# Patient Record
Sex: FEMALE | Race: WHITE | Employment: OTHER | URBAN - METROPOLITAN AREA
[De-identification: names, ages, dates, MRNs, and addresses within clinical notes are randomized per-mention and may not be internally consistent; named-entity substitution may affect disease eponyms.]

---

## 2018-07-07 ENCOUNTER — HOSPITAL ENCOUNTER (EMERGENCY)
Age: 83
Discharge: HOME | End: 2018-07-07
Attending: EMERGENCY MEDICINE
Payer: MEDICARE

## 2018-07-07 ENCOUNTER — APPOINTMENT (OUTPATIENT)
Dept: GENERAL RADIOLOGY | Age: 83
End: 2018-07-07
Attending: NURSE PRACTITIONER
Payer: MEDICARE

## 2018-07-07 VITALS
BODY MASS INDEX: 28.32 KG/M2 | HEART RATE: 65 BPM | TEMPERATURE: 97.6 F | HEIGHT: 61 IN | DIASTOLIC BLOOD PRESSURE: 75 MMHG | SYSTOLIC BLOOD PRESSURE: 135 MMHG | RESPIRATION RATE: 15 BRPM | OXYGEN SATURATION: 95 % | WEIGHT: 150 LBS
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
## 2018-07-07 DIAGNOSIS — S22.41XA CLOSED FRACTURE OF MULTIPLE RIBS OF RIGHT SIDE, INITIAL ENCOUNTER: ICD-10-CM

## 2018-07-07 LAB
INR PPP: 2.1 (ref 0.9–1.1)
PROTHROMBIN TIME: 20.8 SEC (ref 9–11.1)

## 2018-07-07 PROCEDURE — 99283 EMERGENCY DEPT VISIT LOW MDM: CPT

## 2018-07-07 PROCEDURE — 36415 COLL VENOUS BLD VENIPUNCTURE: CPT | Performed by: NURSE PRACTITIONER

## 2018-07-07 PROCEDURE — 77030027138 HC INCENT SPIROMETER -A

## 2018-07-07 PROCEDURE — 71101 X-RAY EXAM UNILAT RIBS/CHEST: CPT

## 2018-07-07 PROCEDURE — 74011250637 HC RX REV CODE- 250/637: Performed by: NURSE PRACTITIONER

## 2018-07-07 PROCEDURE — 85610 PROTHROMBIN TIME: CPT | Performed by: NURSE PRACTITIONER

## 2018-07-07 RX ORDER — HYDROCODONE BITARTRATE AND ACETAMINOPHEN 5; 325 MG/1; MG/1
1 TABLET ORAL
Qty: 20 TAB | Refills: 0 | Status: SHIPPED | OUTPATIENT
Start: 2018-07-07 | End: 2019-08-19

## 2018-07-07 RX ORDER — LIDOCAINE 50 MG/G
PATCH TOPICAL
Qty: 20 EACH | Refills: 0 | Status: SHIPPED | OUTPATIENT
Start: 2018-07-07 | End: 2019-08-19

## 2018-07-07 RX ORDER — WARFARIN SODIUM 5 MG/1
5 TABLET ORAL DAILY
Status: ON HOLD | COMMUNITY
End: 2022-03-20

## 2018-07-07 RX ORDER — ATORVASTATIN CALCIUM 40 MG/1
40 TABLET, FILM COATED ORAL DAILY
COMMUNITY
End: 2022-03-25

## 2018-07-07 RX ORDER — LISINOPRIL 10 MG/1
10 TABLET ORAL DAILY
COMMUNITY
End: 2022-03-25

## 2018-07-07 RX ORDER — ACETAMINOPHEN 500 MG
1000 TABLET ORAL
Status: COMPLETED | OUTPATIENT
Start: 2018-07-07 | End: 2018-07-07

## 2018-07-07 RX ADMIN — ACETAMINOPHEN 1000 MG: 500 TABLET ORAL at 14:48

## 2018-07-07 NOTE — ED PROVIDER NOTES
HPI Comments: Patient is an 68-year-old female with past medical history significant for bilateral hip and knee replacements, arthritis and hypertension he is ambulatory to the ED today with her family after a ground-level fall. Patient states she was trying to get into the house and the door was stuck. The last time she pushed the door popped open and she fell over onto the right side striking a paint can and wishes behind the door. The event occurred about 12:30 this afternoon. Patient states she has some tenderness to her right posterior chest wall were she landed on the paint can. Patient is concerned she may have broken a rib. Patient denies any dizziness, loss of consciousness, shortness of breath, nausea or vomiting. She is no further complaints at this time. Primary care provider:Not On File Bsi The history is provided by the patient and a relative. No  was used. Past Medical History:  
Diagnosis Date  Arthritis  Hypertension Past Surgical History:  
Procedure Laterality Date  CARDIAC SURG PROCEDURE UNLIST  HX ORTHOPAEDIC History reviewed. No pertinent family history. Social History Social History  Marital status:  Spouse name: N/A  
 Number of children: N/A  
 Years of education: N/A Occupational History  Not on file. Social History Main Topics  Smoking status: Never Smoker  Smokeless tobacco: Never Used  Alcohol use No  
 Drug use: Not on file  Sexual activity: Not on file Other Topics Concern  Not on file Social History Narrative  No narrative on file ALLERGIES: Review of patient's allergies indicates no known allergies. Review of Systems Constitutional: Negative for appetite change, chills and fever. HENT: Negative. Respiratory: Negative for cough, shortness of breath and wheezing. Cardiovascular: Positive for chest pain.   
Gastrointestinal: Negative for abdominal pain, constipation, diarrhea, nausea and vomiting. Genitourinary: Negative for dysuria and urgency. Musculoskeletal: Negative for back pain. Skin: Negative for color change and rash. Neurological: Negative for dizziness and headaches. Psychiatric/Behavioral: Negative. All other systems reviewed and are negative. Vitals:  
 07/07/18 1345 BP: 190/86 Pulse: 77 Resp: 18 Temp: 97.6 °F (36.4 °C) SpO2: 97% Weight: 68 kg (150 lb) Height: 5' 1\" (1.549 m) Physical Exam  
Constitutional: She is oriented to person, place, and time. She appears well-developed and well-nourished. HENT:  
Head: Normocephalic and atraumatic. Neck: Trachea normal and normal range of motion. Neck supple. No tracheal tenderness, no spinous process tenderness and no muscular tenderness present. No tracheal deviation present. Cardiovascular: Normal rate, regular rhythm, normal heart sounds and intact distal pulses. Pulmonary/Chest: Effort normal and breath sounds normal. No accessory muscle usage or stridor. No tachypnea and no bradypnea. No respiratory distress. She has no decreased breath sounds. She has no wheezes. She has no rhonchi. She has no rales. She exhibits tenderness and bony tenderness. She exhibits no mass, no crepitus, no edema, no deformity, no swelling and no retraction. Right lateral/posterir rib trauma without ecchymosis or crepitus. Able to speak full sentences. Abdominal: Soft. Bowel sounds are normal. There is no tenderness. There is no guarding. Musculoskeletal: Normal range of motion. Neurological: She is alert and oriented to person, place, and time. Skin: Skin is warm and dry. No erythema. Psychiatric: She has a normal mood and affect. Her speech is normal and behavior is normal. Judgment and thought content normal. Cognition and memory are normal.  
Nursing note and vitals reviewed. [facility] [place] 
 
 
ED Course Assessment & Plan:  
 
Orders Placed This Encounter  XR RIBS RIGHT WITH PA CHEST MIN 3 VIEWS  
 POC PT/INR Discussed with Jeanette Moore MD,ED Provider Rosalie Granado NP 
07/07/18 
1:56 PM 
 
 
Procedures Chest x-ray shows severe diffuse osteopenia with likely nondisplaced rib fractures of the ninth and 10th ribs. This coincides with the area that is tender to palpation. Will discharge the patient home with Norco for pain, and Narcan as needed for respiratory depression, Lidoderm patch and Tylenol as needed for pain. Patient was instructed to monitor how much Tylenol she's taken as the Norco contains Tylenol. Patient states the 1 g Tylenol that was given to her here decreased her pain by half. Patient was also given an incentive spirometer to help prevent pneumonia. Patient advised to follow up with her primary care provider this week. 3:18 PM 
The patient has been reevaluated. The patient is ready for discharge. The patient's signs, symptoms, diagnosis, and discharge instructions have been discussed and the patient/ family has conveyed their understanding. The patient is to follow up as recommended or return to the ED should their symptoms worsen. Plan has been discussed and the patient is in agreement. LABORATORY TESTS: 
Labs Reviewed PROTHROMBIN TIME + INR - Abnormal; Notable for the following:   
    Result Value INR 2.1 (*) Prothrombin time 20.8 (*) All other components within normal limits IMAGING RESULTS: 
Xr Ribs Rt W Pa Cxr Min 3 V Result Date: 7/7/2018 EXAM:  XR RIBS RT W PA CXR MIN 3 V INDICATION:  Pain after right lateral lower rib trauma COMPARISON: None. FINDINGS: A frontal radiograph of the chest and 3 oblique views of the right ribs demonstrate severe diffuse osteopenia. Subtle irregularity of the cortical contour of the lateral right ninth and 10th ribs is shown which is suspicious for fractures. There is no pneumothorax or pleural effusion.  Axillary pacemaker is shown on the left with dual leads. Aortic valve prosthesis is also noted as are medial wires and calcified bilateral breast implants IMPRESSION:  Subtle fractures of right ninth and 10th ribs. MEDICATIONS GIVEN: 
Medications  
acetaminophen (TYLENOL) tablet 1,000 mg (1,000 mg Oral Given 7/7/18 1448) IMPRESSION: 
1. Fall, initial encounter 2. Closed fracture of multiple ribs of right side, initial encounter PLAN: 
1. Current Discharge Medication List  
  
START taking these medications Details HYDROcodone-acetaminophen (NORCO) 5-325 mg per tablet Take 1 Tab by mouth every eight (8) hours as needed for Pain. Max Daily Amount: 3 Tabs. Qty: 20 Tab, Refills: 0 Associated Diagnoses: Closed fracture of multiple ribs of right side, initial encounter  
  
naloxone (NARCAN) 2 mg/actuation spry Use 1 spray intranasally into 1 nostril. Use a new Narcan nasal spray for subsequent doses and administer into alternating nostrils. May repeat every 2 to 3 minutes as needed for opioid overdose symptoms. Indications: OPIATE-INDUCED RESPIRATORY DEPRESSION Qty: 2 Each, Refills: 0  
  
lidocaine (LIDODERM) 5 % Apply patch to the affected area for 12 hours a day and remove for 12 hours a day. Qty: 20 Each, Refills: 0 Associated Diagnoses: Closed fracture of multiple ribs of right side, initial encounter CONTINUE these medications which have NOT CHANGED Details  
warfarin (COUMADIN) 5 mg tablet Take 5 mg by mouth daily. lisinopril (PRINIVIL, ZESTRIL) 5 mg tablet Take 5 mg by mouth daily. OTHER Take 1 Tab by mouth two (2) times a day. metoprolol  
  
atorvastatin (LIPITOR) 40 mg tablet Take 40 mg by mouth daily. 2.  
Follow-up Information Follow up With Details Comments Contact Info Not On File [facility] Schedule an appointment as soon as possible for a visit this week for follow-up Not On File (62) Patient has a PCP but that physician is not listed in [facility].  
  
 [facility] EMERGENCY DEPT  As needed, If symptoms worsen [address]  [address] [facility] [address] [phone] 3. Return to ED for new or worsening symptoms Aries Silva NP

## 2018-07-07 NOTE — ED NOTES
The patient was discharged home by [facility], NP in stable condition. The patient is alert and oriented, in no respiratory distress. The patient's diagnosis, condition and treatment were explained. The patient expressed understanding. Prescriptions given. A discharge plan has been developed. A  was not involved in the process. Aftercare instructions were given. Pt discharged from the ED via w/c by this RN to the family car

## 2018-07-07 NOTE — ED NOTES
Family has returned and at bedside now.  Patient states that her pain is 1/2 of what it was upon arrival.

## 2018-07-07 NOTE — ED NOTES
Patient wet through her pad and pants while in xray. We changed her clothes and gave her new panties and pad. Will send home the jeans in a bag. No change in the pain status in right back; still using ice pack. Lights dimmed for comfort and she is resting.

## 2018-07-07 NOTE — ED TRIAGE NOTES
Patient was walking at home and tripped over a paint can and landed on top of it. Preexisting she wears a back brace intermittently for chronic back pain over the past 3 years. Today has pain in right upper back area. No loss of consciousness and did not hit head. Gave her a pillow and ice pack for comfort. Is able to ambulate to get to the bed. Incident happened at 1230.

## 2018-07-07 NOTE — DISCHARGE INSTRUCTIONS
Broken Rib: Care Instructions  Your Care Instructions    A broken rib is a crack or break in one of the bones of the rib cage. Breathing can be very painful because the muscles used for breathing pull on the rib. In most cases, a broken rib will heal on its own. You can take pain medicine while the rib mends. Pain relief allows you to take deep breaths. In the past, doctors recommended taping or wrapping broken ribs. This is no longer done because taping makes it hard for you to take deep breaths. Taking deep breaths may help prevent pneumonia or a partial collapse of a lung. Your rib will heal in about 6 weeks. You heal best when you take good care of yourself. Eat a variety of healthy foods, and don't smoke. Follow-up care is a key part of your treatment and safety. Be sure to make and go to all appointments, and call your doctor if you are having problems. It's also a good idea to know your test results and keep a list of the medicines you take. How can you care for yourself at home? · Be safe with medicines. Read and follow all instructions on the label. ¨ If the doctor gave you a prescription medicine for pain, take it as prescribed. ¨ If you are not taking a prescription pain medicine, ask your doctor if you can take an over-the-counter medicine. · Even if it hurts, try to cough or take the deepest breath you can at least once every hour. This will get air deeply into your lungs. This may reduce your chance of getting pneumonia or a partial collapse of a lung. Hold a pillow against your chest to make this less painful. · Put ice or a cold pack on the area for 10 to 20 minutes at a time. Put a thin cloth between the ice and your skin. When should you call for help? Call 911 anytime you think you may need emergency care. For example, call if:  ? · You have severe trouble breathing. ?Call your doctor now or seek immediate medical care if:  ? · You have some trouble breathing.    ? · You have a fever.   ? · You have a new or worse cough. ? Watch closely for changes in your health, and be sure to contact your doctor if:  ? · You have pain even after taking your medicine. ? · You do not get better as expected. Where can you learn more? Go to http://alyssa-dalila.info/. Enter M135 in the search box to learn more about \"Broken Rib: Care Instructions. \"  Current as of: March 21, 2017  Content Version: 11.4  © 8036-6275 [email or web address]. Care instructions adapted under license by [employer] (which disclaims liability or warranty for this information). If you have questions about a medical condition or this instruction, always ask your healthcare professional. [address] any warranty or liability for your use of this information.

## 2019-08-19 ENCOUNTER — OFFICE VISIT (OUTPATIENT)
Dept: NEUROLOGY | Age: 84
End: 2019-08-19

## 2019-08-19 VITALS
BODY MASS INDEX: 30.43 KG/M2 | SYSTOLIC BLOOD PRESSURE: 150 MMHG | WEIGHT: 155 LBS | OXYGEN SATURATION: 96 % | HEART RATE: 78 BPM | RESPIRATION RATE: 16 BRPM | DIASTOLIC BLOOD PRESSURE: 84 MMHG | HEIGHT: 60 IN

## 2019-08-19 DIAGNOSIS — R44.3 HALLUCINATION: ICD-10-CM

## 2019-08-19 DIAGNOSIS — R41.3 MEMORY LOSS: Primary | ICD-10-CM

## 2019-08-19 DIAGNOSIS — R41.0 CONFUSION: ICD-10-CM

## 2019-08-19 RX ORDER — FUROSEMIDE 40 MG/1
40 TABLET ORAL
COMMUNITY
End: 2022-03-25

## 2019-08-19 RX ORDER — METOPROLOL TARTRATE 50 MG/1
TABLET ORAL 2 TIMES DAILY
COMMUNITY
End: 2022-03-25

## 2019-08-19 RX ORDER — SERTRALINE HYDROCHLORIDE 25 MG/1
TABLET, FILM COATED ORAL
Qty: 30 TAB | Refills: 3 | Status: SHIPPED | OUTPATIENT
Start: 2019-08-19 | End: 2020-06-13

## 2019-08-19 RX ORDER — MEMANTINE HYDROCHLORIDE 5 MG/1
TABLET ORAL
Refills: 0 | COMMUNITY
Start: 2019-07-02 | End: 2019-08-19 | Stop reason: SDUPTHER

## 2019-08-19 RX ORDER — SERTRALINE HYDROCHLORIDE 25 MG/1
TABLET, FILM COATED ORAL
Refills: 2 | COMMUNITY
Start: 2019-08-07 | End: 2019-08-19 | Stop reason: SDUPTHER

## 2019-08-19 RX ORDER — RISPERIDONE 1 MG/1
1 TABLET, FILM COATED ORAL 2 TIMES DAILY
Qty: 60 TAB | Refills: 3 | Status: SHIPPED | OUTPATIENT
Start: 2019-08-19 | End: 2019-10-29 | Stop reason: SDUPTHER

## 2019-08-19 RX ORDER — TOLTERODINE 4 MG/1
4 CAPSULE, EXTENDED RELEASE ORAL
COMMUNITY
End: 2022-03-25

## 2019-08-19 RX ORDER — PANTOPRAZOLE SODIUM 40 MG/1
40 TABLET, DELAYED RELEASE ORAL
COMMUNITY
End: 2022-03-25

## 2019-08-19 RX ORDER — MEMANTINE HYDROCHLORIDE 5 MG/1
TABLET ORAL
Qty: 60 TAB | Refills: 3 | Status: SHIPPED | OUTPATIENT
Start: 2019-08-19 | End: 2019-10-29 | Stop reason: SDUPTHER

## 2019-08-19 NOTE — PATIENT INSTRUCTIONS
[facility]   Statement to Patients  April 1, 2014      In an effort to ensure the large volume of patient prescription refills is processed in the most efficient and expeditious manner, we are asking our patients to assist us by calling your Pharmacy for all prescription refills, this will include also your  Mail Order Pharmacy. The pharmacy will contact our office electronically to continue the refill process. Please do not wait until the last minute to call your pharmacy. We need at least 48 hours (2days) to fill prescriptions. We also encourage you to call your pharmacy before going to  your prescription to make sure it is ready. With regard to controlled substance prescription refill requests (narcotic refills) that need to be picked up at our office, we ask your cooperation by providing us with at least 72 hours (3days) notice that you will need a refill. We will not refill narcotic prescription refill requests after 4:00pm on any weekday, Monday through Thursday, or after 2:00pm on Fridays, or on the weekends. We encourage everyone to explore another way of getting your prescription refill request processed using [employer], our patient web portal through our electronic medical record system. [employer] is an efficient and effective way to communicate your medication request directly to the office and  downloadable as an stewart on your smart phone . [employer] also features a review functionality that allows you to view your medication list as well as leave messages for your physician. Are you ready to get connected? If so please review the attatched instructions or speak to any of our staff to get you set up right away! Thank you so much for your cooperation. Should you have any questions please contact our Practice Administrator. RESULT POLICY      If we have ordered testing for you, know that; \"NO NEWS IS GOOD NEWS! \"     It is our policy that we know longer call patients with results, nor do we  give test results over the phone. We schedule follow up appointments so that your results can be discussed in person. This allows you to address any questions you have regarding the results. If you choose to go to an imaging center outside of [facility], it is your responsibility to bring imaging report and disc to follow up appointment. If something of concern is revealed on your test, we will contact you to discuss the matter and if needed schedule a sooner follow up appointment. Additionally, results may be found by using the My Chart feature and one of our patient service representatives at the  can give you instructions on how to access this feature to utilize our electronic medical record system. Thank you for your understanding.

## 2019-08-19 NOTE — PROGRESS NOTES
Chief Complaint   Patient presents with   [address] [facility] Patient    Dementia     Hospitalized 6/14-19/2019 at [facility] [place], operated by [facility]

## 2019-08-19 NOTE — PROGRESS NOTES
[facility] and [address] at [facility] Neurology Clinics at [address] [facility], [address] [facility] [address] [facility]  Cassie Gonzalez, [address] [place] [address]  [phone] Office  [phone] Facsimile           Referring: Self    Chief Complaint   Patient presents with    New Patient    Dementia   81-year-old right-handed woman who comes today coming by her daughter-in-law for evaluation of what they call follow-up from hospital visit where she was diagnosed with dementia. This 81-year-old lady was living by herself in [facility] until she was moved to [place] to the house next door to her son and daughter-in-law. She was moved so that she could be closer to family. They note that while in [facility] the patient was taking care of all of her finances. She was taking care of all of her household chores. She was not having any difficulty doing that. She in fact is now doing that herself here in [place]. After the move to [place] however the patient began to be increasingly confused. This was in April. This was an abrupt change. When the family looks back they say that there was some signs that she was having some cognitive decline but after the move it was an abrupt change. She started to hear things. She became paranoid. She thought there were people in the roof. She thought the neighbor was a murder who was trying to kill the grandson. She would wander in the woods. She went over to pick. She was calling the police. Things got so bad with the hallucinations that they took her to the hospital and she was hospitalized at the [facility]. She was started on Namenda and Risperdal.  When she got home she started to have these behaviors properly.   When they got her back on the medicine properly the combination of Namenda, Zoloft and Risperdal she became back to normal.  There was a CT scan done apparently when she was hospitalized and that was said to be normal.  No formal cognitive testing. No other testing done. There is family history of dementia. No focal deficits. No chest pain. No palpitations. No infectious symptoms. No fever chills. No injury. She is back to normal at this point. Past Medical History:   Diagnosis Date    Arthritis     Hypertension        Past Surgical History:   Procedure Laterality Date    CARDIAC SURG PROCEDURE UNLIST      HX ORTHOPAEDIC         Current Outpatient Medications   Medication Sig Dispense Refill    furosemide (LASIX) 40 mg tablet Take 40 mg by mouth.  pantoprazole (PROTONIX) 40 mg tablet Take 40 mg by mouth.  tolterodine ER (DETROL LA) 4 mg ER capsule Take 4 mg by mouth.  metoprolol tartrate (LOPRESSOR) 50 mg tablet Take  by mouth two (2) times a day.  memantine (NAMENDA) 5 mg tablet TAKE 1 TABLET TWICE A DAY 60 Tab 3    sertraline (ZOLOFT) 25 mg tablet TAKE 1 TABLET ORALLY  DAILY 30 Tab 3    risperiDONE (RISPERDAL) 1 mg tablet Take 1 Tab by mouth two (2) times a day. 60 Tab 3    lisinopril (PRINIVIL, ZESTRIL) 10 mg tablet Take 10 mg by mouth daily.  atorvastatin (LIPITOR) 40 mg tablet Take 40 mg by mouth daily.  warfarin (COUMADIN) 5 mg tablet Take 5 mg by mouth daily. Allergies   Allergen Reactions    Omeprazole Angioedema       Social History     Tobacco Use    Smoking status: Never Smoker    Smokeless tobacco: Never Used   Substance Use Topics    Alcohol use: No    Drug use: Not on file       Family History   Problem Relation Age of Onset    Dementia Other     Heart Disease Other        Review of Systems  Pertinent positives and negatives as noted with remainder of comprehensive review negative    Examination  Visit Vitals  /84 (BP 1 Location: Left arm, BP Patient Position: Sitting)   Pulse 78   Resp 16   Ht 5' (1.524 m)   Wt 70.3 kg (155 lb)   SpO2 96%   BMI 30.27 kg/m²   She is a very pleasant lady.   She has appropriate dress and grooming. Her affect is appropriate. She has appropriate make-up placed today. She has no scleral icterus. Her neck is supple I do not hear a bruit. Her heart is regular. She has symmetrical pulses. There is no edema of her extremities. No rash present. No bruising of the extremities. Neurologically she is awake alert oriented to the correct day date and month. She knows the current president as well as the previous president. She identifies the floor correctly as well as a city and state. She correctly calculates. Follows all commands. No right left confusion. Registration 3/3 and registration 2/3 at 5 minutes. Intact cranial nerves II-XII and she is status post cataract surgery bilaterally. Visual fields full to confrontation. Disc margins not well seen. Age-related paratonia throughout. No abnormal movement. No pronation and no drift. Strength full in the upper and lower extremities in all muscle groups to direct confrontational testing directly. Reflexes symmetrical in the upper and lower extremities bilaterally. No pathologic reflexes. No ataxia. Sensory is intact primary modalities. Gait is steady. 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 I be concerned that perhaps there was something else ongoing.   In any regard him to maintain the medications that she is on right now I did send refills because she is running out. Going to send for the records from the [facility]. I like to make sure the imaging was done. We will get EEG as well to evaluate for epileptiform abnormalities. Carotid Doppler. I also like to make sure they did appropriate laboratory analyses which I am certainly did as [facility] is quite thorough. I like to get a formal neuropsychological evaluation. She will follow-up at conclusion of that testing    Halie Haskins MD    This note was created using voice recognition software. Despite editing, there may be syntax errors. This note will not be viewable in [address]

## 2019-08-27 ENCOUNTER — TELEPHONE (OUTPATIENT)
Dept: NEUROLOGY | Age: 84
End: 2019-08-27

## 2019-08-27 NOTE — TELEPHONE ENCOUNTER
PA submitted for Risperidone to [facility] [email or web address] [facility] via Cover My Meds. Status Pending. Allow 24-72 hours for response.      CMM Key:  [id number]  Case #: [id number]

## 2019-08-28 NOTE — TELEPHONE ENCOUNTER
PA APPROVED for Risperidone 1mg by [facility] [employer] [facility]. Effective dates 08/27/19 - 08/26/21. Case # (none given). Approval will be scanned into media for review. Please send Rx to patient's preferred pharmacy (if necessary).

## 2019-10-29 RX ORDER — MEMANTINE HYDROCHLORIDE 5 MG/1
TABLET ORAL
Qty: 60 TAB | Refills: 3 | Status: SHIPPED | OUTPATIENT
Start: 2019-10-29 | End: 2020-12-02

## 2019-10-29 RX ORDER — RISPERIDONE 1 MG/1
1 TABLET, FILM COATED ORAL 2 TIMES DAILY
Qty: 60 TAB | Refills: 3 | Status: SHIPPED | OUTPATIENT
Start: 2019-10-29 | End: 2020-12-02

## 2019-12-22 ENCOUNTER — DOCUMENTATION ONLY (OUTPATIENT)
Dept: NEUROLOGY | Age: 84
End: 2019-12-22

## 2019-12-22 NOTE — PROGRESS NOTES
View and organization of outside records. Records received from [facility] for admission date Regina 15, 2019 discharge date noted to be July 2, 2019. Discharge diagnoses unspecified dementia with behavioral disturbance  Major depressive disorder  Chronic diastolic heart failure  Urinary tract infection  Deficiency of vitamin B  Dyslipidemia  Peptic ulcer  Anxiety disorder  Hypertensive heart disease with heart failure  Overactive bladder  Long-term current use of drug therapy  Long-term current use of aspirin  Presence of prosthetic heart valve  Wandering and diseases classified elsewhere    Laboratory analyses from June 22, 2896 finds a metabolic panel largely unrevealing except for BUN slightly high at 21. Creatinine was normal.  LDL 73  CBC from June 14, 2009 unremarkable  Urinalysis from June 14, 2019 with leukocyte esterase and pyuria    Psychiatric progress note from July 1, 2019 where patient noted she was doing better. Patient said to have a. Much [place] and stated she was not hearing voices. She was preoccupied with her grandsons death and she was noted to be telling folks that he was shot. She was anxious but redirectable. She had been compliant with her treatment without mona. She was alert and oriented x3. At that time she was on Risperdal 1 mg twice daily as well as Zoloft 25 mg nightly. She was also on Namenda 5 mg twice daily, vitamin B12 orally as well as trazodone 50 mg at bedtime. Other medications also listed. Diagnoses were dementia, psychosis and urinary tract infection. Psychiatric progress note from June 30, 2019 patient was said to be more calm. Actually it has the same interval history as the previous note.   There is a list under diagnosis assessment and plan of 6/24/2019 and spoke with patient's son who is concerned of her ongoing psychosis-we will DC Seroquel add Risperdal and titrate  6/25/2019 increase Risperdal to 1 mg at bedtime and 0.5 mg every morning and continue Namenda  6/26/2019 add Zoloft for depressive symptoms  6/30/2019, continue to adjust medication Zoloft and Namenda. Risperdal added for psychotic symptoms but still delusional dose increased    Psychiatric progress note from June 29, 2019 has the same interval history as the previous last 2 notes and has the same plan as the previous day although 6/29/2019's is calmer continue to adjust medications Zoloft and Namenda with Risperdal added for psychotic symptoms and dose increase    Office documentation from June 27, 2019 again with the same interval history. Able vital signs. Plan was the same as outlined above    June 26, 2019 psychiatric progress note indicates the patient's mood was sad and fearful and she believes someone is shot her grandson and she was hearing voices with crying spells. She was anxious but redirectable. No mona and she was said to be oriented x3. Plan as noted above    June 25, 2019 progress note patient said to have a better mood and less preoccupied with delusional themes. No longer hearing voices since yesterday of neighbors trying to harm her grandson. Poor attention. Poor short-term memory. No agitation or behavior abnormality. Same plan    Progress note from June 24, 2019 noted patient had a bad weekend. She had been crying stating she was hearing voices of her neighbors trying to harm her grandson. Poor attention. Poor short-term memory. Compliant. No agitation. Same plan    June 23, 2019 progress note psychiatric. This notes patient has a history of dementia was complaining of auditory hallucinations but not now. No paranoid feelings. Not depressed. She was oriented and appropriately dressed and they were going to continue the treatment plan. Psychiatric progress note from June 22, 2019 indicates patient was admitted due to psychosis and history of dementia. Family brought her in due to behavior which was erratic and bizarre.   She was wandering out of the home and hearing voices. She denies any hallucination at that time. She was paranoid particularly about the neighbors. She was eating and sleeping okay. She was compliant with her medicines. Psychiatric progress note from June 24, 2019 with the patient was alert and appeared relaxed. Pleasant with poor short-term memory. She was hearing voices but she did not feel threatened and denied being paranoid. With no agitation. Several notes basically outlining the same. The initial history and physical dated Regina 15, 2019 finds an 81-year-old lady with auditory and visual hallucinations for a month. Recent diagnosis of dementia started on new medication and told by her neurologist to come to the ER if her symptoms worsen. Family noted she was having hallucinations of her grandson being in danger particularly danger of being harmed by a neighbor. She made statements she was going to hurt her grandson. She denies suicidal or homicidal ideation at that time. She was said to be oriented x3 with normal speech and grossly nonfocal.    Laboratory analyses found a normal CBC. Urine was dirty. CT scan of the head said to show white matter abnormalities but nothing acute. She was given Rocephin. She was discharged from the hospital    EKG dated June 14, 2019 with left ventricular hypertrophy and repolarization abnormality. Copious nursing documentation.     A total of 156 pages reviewed and summarized as above    Sharad Grace MD  12noon - 1226pm

## 2020-06-13 RX ORDER — SERTRALINE HYDROCHLORIDE 25 MG/1
TABLET, FILM COATED ORAL
Qty: 30 TAB | Refills: 3 | Status: SHIPPED | OUTPATIENT
Start: 2020-06-13 | End: 2020-09-14

## 2020-09-14 RX ORDER — SERTRALINE HYDROCHLORIDE 25 MG/1
TABLET, FILM COATED ORAL
Qty: 30 TAB | Refills: 3 | Status: SHIPPED | OUTPATIENT
Start: 2020-09-14 | End: 2021-02-25

## 2020-12-02 RX ORDER — MEMANTINE HYDROCHLORIDE 5 MG/1
TABLET ORAL
Qty: 60 TAB | Refills: 3 | Status: SHIPPED | OUTPATIENT
Start: 2020-12-02 | End: 2021-04-07

## 2020-12-02 RX ORDER — RISPERIDONE 1 MG/1
1 TABLET, FILM COATED ORAL 2 TIMES DAILY
Qty: 60 TAB | Refills: 3 | Status: SHIPPED | OUTPATIENT
Start: 2020-12-02 | End: 2021-04-07

## 2021-02-25 RX ORDER — SERTRALINE HYDROCHLORIDE 25 MG/1
TABLET, FILM COATED ORAL
Qty: 30 TAB | Refills: 3 | Status: SHIPPED | OUTPATIENT
Start: 2021-02-25 | End: 2021-09-21 | Stop reason: SDUPTHER

## 2021-04-07 RX ORDER — RISPERIDONE 1 MG/1
TABLET, FILM COATED ORAL
Qty: 60 TAB | Refills: 3 | Status: SHIPPED | OUTPATIENT
Start: 2021-04-07 | End: 2021-09-21 | Stop reason: SDUPTHER

## 2021-04-07 RX ORDER — MEMANTINE HYDROCHLORIDE 5 MG/1
TABLET ORAL
Qty: 60 TAB | Refills: 3 | Status: SHIPPED | OUTPATIENT
Start: 2021-04-07 | End: 2021-09-21 | Stop reason: SDUPTHER

## 2021-09-21 ENCOUNTER — OFFICE VISIT (OUTPATIENT)
Dept: NEUROLOGY | Age: 86
End: 2021-09-21
Payer: MEDICARE

## 2021-09-21 VITALS — WEIGHT: 155 LBS | SYSTOLIC BLOOD PRESSURE: 146 MMHG | BODY MASS INDEX: 30.27 KG/M2 | DIASTOLIC BLOOD PRESSURE: 94 MMHG

## 2021-09-21 DIAGNOSIS — F03.92 DEMENTIA WITH PSYCHOSIS: Primary | ICD-10-CM

## 2021-09-21 PROCEDURE — G8417 CALC BMI ABV UP PARAM F/U: HCPCS | Performed by: PSYCHIATRY & NEUROLOGY

## 2021-09-21 PROCEDURE — G8427 DOCREV CUR MEDS BY ELIG CLIN: HCPCS | Performed by: PSYCHIATRY & NEUROLOGY

## 2021-09-21 PROCEDURE — 1090F PRES/ABSN URINE INCON ASSESS: CPT | Performed by: PSYCHIATRY & NEUROLOGY

## 2021-09-21 PROCEDURE — 99214 OFFICE O/P EST MOD 30 MIN: CPT | Performed by: PSYCHIATRY & NEUROLOGY

## 2021-09-21 PROCEDURE — G8536 NO DOC ELDER MAL SCRN: HCPCS | Performed by: PSYCHIATRY & NEUROLOGY

## 2021-09-21 PROCEDURE — G8510 SCR DEP NEG, NO PLAN REQD: HCPCS | Performed by: PSYCHIATRY & NEUROLOGY

## 2021-09-21 PROCEDURE — 1101F PT FALLS ASSESS-DOCD LE1/YR: CPT | Performed by: PSYCHIATRY & NEUROLOGY

## 2021-09-21 PROCEDURE — G8400 PT W/DXA NO RESULTS DOC: HCPCS | Performed by: PSYCHIATRY & NEUROLOGY

## 2021-09-21 RX ORDER — MEMANTINE HYDROCHLORIDE 5 MG/1
5 TABLET ORAL 2 TIMES DAILY
Qty: 60 TABLET | Refills: 11 | Status: SHIPPED | OUTPATIENT
Start: 2021-09-21 | End: 2022-09-21 | Stop reason: SDUPTHER

## 2021-09-21 RX ORDER — RISPERIDONE 1 MG/1
TABLET, FILM COATED ORAL
Qty: 60 TABLET | Refills: 11 | Status: SHIPPED | OUTPATIENT
Start: 2021-09-21 | End: 2022-09-21 | Stop reason: SDUPTHER

## 2021-09-21 RX ORDER — SERTRALINE HYDROCHLORIDE 25 MG/1
25 TABLET, FILM COATED ORAL DAILY
Qty: 30 TABLET | Refills: 11 | Status: SHIPPED | OUTPATIENT
Start: 2021-09-21 | End: 2022-09-21 | Stop reason: SDUPTHER

## 2021-09-21 NOTE — PROGRESS NOTES
[facility] and [address] at [facility] at [facility], [address] [place] [address] [address] [facility], [address] [place] [address]   [phone]              Chief Complaint   Patient presents with    Dementia     Current Outpatient Medications   Medication Sig Dispense Refill    risperiDONE (RisperDAL) 1 mg tablet TAKE 1 TAB BY MOUTH TWO TIMES A DAY. (Patient taking differently: Take 1 mg by mouth.) 60 Tab 3    memantine (NAMENDA) 5 mg tablet TAKE 1 TABLET TWICE A DAY 60 Tab 3    sertraline (ZOLOFT) 25 mg tablet TAKE 1 TABLET DAILY 30 Tab 3    furosemide (LASIX) 40 mg tablet Take 40 mg by mouth.  pantoprazole (PROTONIX) 40 mg tablet Take 40 mg by mouth.  tolterodine ER (DETROL LA) 4 mg ER capsule Take 4 mg by mouth.  metoprolol tartrate (LOPRESSOR) 50 mg tablet Take  by mouth two (2) times a day.  lisinopril (PRINIVIL, ZESTRIL) 10 mg tablet Take 10 mg by mouth daily.  atorvastatin (LIPITOR) 40 mg tablet Take 40 mg by mouth daily.  warfarin (COUMADIN) 5 mg tablet Take 5 mg by mouth daily. (Patient not taking: Reported on 9/21/2021)        Allergies   Allergen Reactions    Omeprazole Angioedema     Social History     Tobacco Use    Smoking status: Never Smoker    Smokeless tobacco: Never Used   Substance Use Topics    Alcohol use: No    Drug use: Not on file   33-year-old lady returns today with her son for follow-up after heronsultation for cognitive decline. She has dementia with psychosis. This visit was back in August 2019. Records from [facility] reviewed. I sent her for an EEG a Doppler and a formal neuropsychological evaluation  [facility] records reviewed and summarized where she was admitted Regina 15 to July 2 with major depressive disorder and it stopped hearing voices. She is on Risperdal 1 mg twice daily and Zoloft 25 mg nightly as well as Namenda 5 mg twice daily.   CT with age-related changes    She was unable to complete her neuropsychological evaluation    Her son is with her today b. She says her memory is fine. He notes that she has had decline in her cognitive functioning. Tolerating medicines. No dangerous behaviors. Hallucinations are controlled. Examination  Visit Vitals  BP (!) 146/94 (BP 1 Location: Left upper arm, BP Patient Position: Sitting, BP Cuff Size: Adult)   Wt 70.3 kg (155 lb)   BMI 30.27 kg/m²   She is well-appearing today. She is awake alert and oriented to herself and her son. She cannot tell me the month the date or the year. Cannot tell me the president. Follows all commands. 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 Despite editing, there may be syntax errors.

## 2022-01-08 ENCOUNTER — APPOINTMENT (OUTPATIENT)
Dept: GENERAL RADIOLOGY | Age: 87
End: 2022-01-08
Attending: EMERGENCY MEDICINE
Payer: MEDICARE

## 2022-01-08 ENCOUNTER — HOSPITAL ENCOUNTER (EMERGENCY)
Age: 87
Discharge: HOME | End: 2022-01-08
Attending: EMERGENCY MEDICINE
Payer: MEDICARE

## 2022-01-08 VITALS
TEMPERATURE: 98.6 F | OXYGEN SATURATION: 96 % | RESPIRATION RATE: 16 BRPM | SYSTOLIC BLOOD PRESSURE: 107 MMHG | HEART RATE: 69 BPM | DIASTOLIC BLOOD PRESSURE: 62 MMHG

## 2022-01-08 DIAGNOSIS — B34.9 VIRAL ILLNESS: Primary | ICD-10-CM

## 2022-01-08 DIAGNOSIS — Z20.822 ENCOUNTER FOR LABORATORY TESTING FOR COVID-19 VIRUS: ICD-10-CM

## 2022-01-08 LAB
FLUAV AG NPH QL IA: NEGATIVE
FLUBV AG NOSE QL IA: NEGATIVE

## 2022-01-08 PROCEDURE — 99282 EMERGENCY DEPT VISIT SF MDM: CPT

## 2022-01-08 PROCEDURE — 87804 INFLUENZA ASSAY W/OPTIC: CPT

## 2022-01-08 PROCEDURE — U0005 INFEC AGEN DETEC AMPLI PROBE: HCPCS

## 2022-01-08 PROCEDURE — 71045 X-RAY EXAM CHEST 1 VIEW: CPT

## 2022-01-08 RX ORDER — ALBUTEROL SULFATE 90 UG/1
2 AEROSOL, METERED RESPIRATORY (INHALATION)
Qty: 1 EACH | Refills: 0 | Status: SHIPPED | OUTPATIENT
Start: 2022-01-08 | End: 2022-03-25

## 2022-01-08 NOTE — ED NOTES
The patient was discharged home by Dr. Mena Peralta in stable condition. The patient is alert and oriented, in no respiratory distress and discharge vital signs obtained. The patient's diagnosis, condition and treatment were explained. The patient expressed understanding. Two prescriptions e-scribed to pharmacy. No work/school note given. A discharge plan has been developed. A  was not involved in the process. Aftercare instructions were given. Pt discharged from the ED via w/c by   family.

## 2022-01-08 NOTE — ED TRIAGE NOTES
Pt assisted to treatment area via wheelchair her son states that his Mom has had a sore throat since yesterday and a cough with diarrhea today. Pt denies any fevers, denies any abdominal pain or urinary symptoms.

## 2022-01-09 NOTE — ED PROVIDER NOTES
Patient is a 42-year-old woman who comes into the emergency department with sore throat, cough, and diarrhea. Symptoms started yesterday and are not associated with abdominal pain, nausea, vomiting, fevers, or urinary symptoms. Patient is vaccinated against COVID x2 but has not yet received her booster and has not had her flu shot. Patient has not had any contact with anyone who has been sick. She has not tried anything for her symptoms and has not found anything that makes them better or worse. The history is provided by the patient. Past Medical History:   Diagnosis Date    Arthritis     Dementia ([address]     High cholesterol     Hypertension        Past Surgical History:   Procedure Laterality Date    HX ORTHOPAEDIC      PA CARDIAC SURG PROCEDURE UNLIST           Family History:   Problem Relation Age of Onset    Dementia Other     Heart Disease Other        Social History     Socioeconomic History    Marital status:      Spouse name: Not on file    Number of children: Not on file    Years of education: Not on file    Highest education level: Not on file   Occupational History    Not on file   Tobacco Use    Smoking status: Never Smoker    Smokeless tobacco: Never Used   Substance and Sexual Activity    Alcohol use: No    Drug use: Not on file    Sexual activity: Not on file   Other Topics Concern    Not on file   Social History Narrative    Not on file     Social Determinants of Health     Financial Resource Strain:     Difficulty of Paying Living Expenses: Not on file   Food Insecurity:     Worried About [address] in the Last Year: Not on file    [address] in the Last Year: Not on file   Transportation Needs:     Lack of Transportation (Medical): Not on file    Lack of Transportation (Non-Medical):  Not on file   Physical Activity:     Days of Exercise per Week: Not on file    Minutes of Exercise per Session: Not on file   Stress:     Feeling of Stress : Not on file   Social Connections:     Frequency of Communication with Friends and Family: Not on file    Frequency of Social Gatherings with Friends and Family: Not on file    Attends [employer] Services: Not on file    Active Member of Clubs or Organizations: Not on file    Attends Club or Organization Meetings: Not on file    Marital Status: Not on file   Intimate Partner Violence:     Fear of Current or Ex-Partner: Not on file    Emotionally Abused: Not on file    Physically Abused: Not on file    Sexually Abused: Not on file   Housing Stability:     Unable to Pay for Housing in the Last Year: Not on file    Number of [place] in the Last Year: Not on file    Unstable Housing in the Last Year: Not on file         ALLERGIES: Omeprazole    Review of Systems   Constitutional: Positive for fatigue. HENT: Positive for sore throat. Respiratory: Positive for cough. Gastrointestinal: Positive for diarrhea. Negative for abdominal pain, nausea and vomiting. All other systems reviewed and are negative. Vitals:    01/08/22 1640   BP: 107/62   Pulse: 69   Resp: 16   Temp: 98.6 °F (37 °C)   SpO2: 96%            Physical Exam  Vitals and nursing note reviewed. Constitutional:       Appearance: She is well-developed. HENT:      Head: Normocephalic and atraumatic. Nose: No congestion. Mouth/Throat:      Mouth: No oral lesions. Pharynx: No oropharyngeal exudate or posterior oropharyngeal erythema. Eyes:      Pupils: Pupils are equal, round, and reactive to light. Cardiovascular:      Rate and Rhythm: Normal rate and regular rhythm. Pulmonary:      Effort: Pulmonary effort is normal.      Breath sounds: Wheezing present. Abdominal:      General: There is no distension. Palpations: Abdomen is soft. Tenderness: There is no abdominal tenderness. Musculoskeletal:      Cervical back: Normal range of motion and neck supple.    Skin:     General: Skin is warm and dry. Capillary Refill: Capillary refill takes less than 2 seconds. Neurological:      General: No focal deficit present. Mental Status: She is alert and oriented to person, place, and time. Psychiatric:         Mood and Affect: Mood normal.         Behavior: Behavior normal.          MDM  Number of Diagnoses or Management Options  Encounter for laboratory testing for COVID-19 virus: new and requires workup  Viral illness: new and requires workup         Procedures    The patient is resting comfortably and feels better, is alert and in no distress. On re-examination the patient does not appear toxic and has no meningeal signs, and there is no intractable vomiting, no respiratory distress and no apparent pain. Based on the history, exam, diagnostic testing (if any) and reassessment, the patient has no signs of significant pneumonia, systemic immune response syndrome, sepsis or other acute serious bacterial infections, or other significant pathology to warrant further testing, continued ED treatment, admission or specialist evaluation. The patient's vital signs have been stable. The patient's condition is stable and is appropriate for discharge. The patient or caregiver will pursue further outpatient evaluation with the primary care physician or other designated or consulting physician as indicated in the discharge instructions.

## 2022-01-10 ENCOUNTER — PATIENT OUTREACH (OUTPATIENT)
Dept: CASE MANAGEMENT | Age: 87
End: 2022-01-10

## 2022-01-10 LAB
SARS-COV-2, XPLCVT: DETECTED
SOURCE, COVRS: ABNORMAL

## 2022-01-10 NOTE — PROGRESS NOTES
Patient's daughter, Romaine Boo, contacted regarding COVID-19 diagnosis. Discussed COVID-19 related testing which was available at this time. Test results were positive. Patient informed of results, if available? No, dtr is aware of pt's pos result. Ambulatory Care Manager contacted the family by telephone to perform post discharge assessment. Call within 2 business days of discharge: Yes Verified name and  with family as identifiers. Provided introduction to self, and explanation of the CTN/ACM role, and reason for call due to risk factors for infection and/or exposure to COVID-19. Symptoms reviewed with family who verbalized the following symptoms: no new symptoms and no worsening symptoms      Due to no new or worsening symptoms encounter was not routed to provider for escalation. Discussed follow-up appointments. If no appointment was previously scheduled, appointment scheduling offered:  Pt's dtr was provided w/contact info for [facility] should pt's condition change. [facility] follow up appointment(s):   Future Appointments   Date Time Provider David Terrazas   2022  1:30 PM Lamont Craven  [facility]     Non-[facility] follow up appointment(s): n/a    Interventions to address risk factors: n/a     Advance Care Planning:   Does patient have an Advance Directive: not on file. Educated patient about risk for severe COVID-19 due to risk factors according to CDC guidelines. ACM reviewed discharge instructions, medical action plan and red flag symptoms with the family who verbalized understanding. Discussed COVID vaccination status: no. Education provided on COVID-19 vaccination as appropriate. Discussed exposure protocols and quarantine with CDC Guidelines. Family was given an opportunity to verbalize any questions and concerns and agrees to contact ACM or health care provider for questions related to their healthcare.     Reviewed and educated family on any new and changed medications related to discharge diagnosis     Was patient discharged with a pulse oximeter? no Discussed and confirmed pulse oximeter discharge instructions and when to notify provider or seek emergency care. ACM provided contact information.  Plan for follow-up call in 1-2 days based on severity of symptoms and risk factors.   Pantera Rosen

## 2022-01-12 ENCOUNTER — PATIENT OUTREACH (OUTPATIENT)
Dept: CASE MANAGEMENT | Age: 87
End: 2022-01-12

## 2022-01-12 NOTE — PROGRESS NOTES
Ambulatory Care Management Note    Date/Time:  1/12/2022 3:09 PM  Attempted to contact pt today to check in on her current condition. Left a HIPPA compliant message w/pt's dtr/emergency contact requesting a return call to [facility].    Will attempt to reach patient again later.  /dla

## 2022-01-18 ENCOUNTER — PATIENT OUTREACH (OUTPATIENT)
Dept: CASE MANAGEMENT | Age: 87
End: 2022-01-18

## 2022-02-22 ENCOUNTER — TELEPHONE (OUTPATIENT)
Dept: NEUROLOGY | Age: 87
End: 2022-02-22

## 2022-02-22 DIAGNOSIS — F03.92 DEMENTIA WITH PSYCHOSIS: Primary | ICD-10-CM

## 2022-02-22 NOTE — TELEPHONE ENCOUNTER
Pt's son stated that at the last appointment with ,  talked about possibily receving in home care to help with the pt. Pt's son wants to further explore that option as it is becoming challenging taking care of pt, pt isn't doing daily living activities as normal and they have tried to work with her and pt won't take any instruction/guidance from son and DIL. Pt's son really wants to get professional help for the home. Pt's son stated that you can leave a voicemail.

## 2022-03-16 ENCOUNTER — HOSPITAL ENCOUNTER (EMERGENCY)
Age: 87
Discharge: ANOTHER HOSPITAL | End: 2022-03-16
Attending: EMERGENCY MEDICINE
Payer: MEDICARE

## 2022-03-16 ENCOUNTER — APPOINTMENT (OUTPATIENT)
Dept: CT IMAGING | Age: 87
End: 2022-03-16
Attending: EMERGENCY MEDICINE
Payer: MEDICARE

## 2022-03-16 ENCOUNTER — HOSPITAL ENCOUNTER (INPATIENT)
Age: 87
LOS: 9 days | Discharge: NURSING, REHAB OR LONG-TERM CARE | DRG: 552 | End: 2022-03-25
Attending: FAMILY MEDICINE | Admitting: STUDENT IN AN ORGANIZED HEALTH CARE EDUCATION/TRAINING PROGRAM
Payer: MEDICARE

## 2022-03-16 ENCOUNTER — APPOINTMENT (OUTPATIENT)
Dept: GENERAL RADIOLOGY | Age: 87
End: 2022-03-16
Attending: EMERGENCY MEDICINE
Payer: MEDICARE

## 2022-03-16 VITALS
WEIGHT: 155 LBS | DIASTOLIC BLOOD PRESSURE: 101 MMHG | HEART RATE: 70 BPM | TEMPERATURE: 99.2 F | OXYGEN SATURATION: 97 % | SYSTOLIC BLOOD PRESSURE: 123 MMHG | BODY MASS INDEX: 30.27 KG/M2 | RESPIRATION RATE: 18 BRPM

## 2022-03-16 DIAGNOSIS — R41.82 ALTERED MENTAL STATUS, UNSPECIFIED ALTERED MENTAL STATUS TYPE: ICD-10-CM

## 2022-03-16 DIAGNOSIS — R27.0 ATAXIA: Primary | ICD-10-CM

## 2022-03-16 PROBLEM — M48.02 SPINAL STENOSIS IN CERVICAL REGION: Status: ACTIVE | Noted: 2022-03-16

## 2022-03-16 LAB
ALBUMIN SERPL-MCNC: 3.5 G/DL (ref 3.5–5)
ALBUMIN/GLOB SERPL: 1 {RATIO} (ref 1.1–2.2)
ALP SERPL-CCNC: 81 U/L (ref 45–117)
ALT SERPL-CCNC: 23 U/L (ref 12–78)
ANION GAP SERPL CALC-SCNC: 10 MMOL/L (ref 5–15)
AST SERPL-CCNC: 25 U/L (ref 15–37)
BASOPHILS # BLD: 0.1 K/UL (ref 0–0.1)
BASOPHILS NFR BLD: 1 % (ref 0–1)
BILIRUB SERPL-MCNC: 1.5 MG/DL (ref 0.2–1)
BUN SERPL-MCNC: 27 MG/DL (ref 6–20)
BUN/CREAT SERPL: 35 (ref 12–20)
CALCIUM SERPL-MCNC: 8.8 MG/DL (ref 8.5–10.1)
CHLORIDE SERPL-SCNC: 105 MMOL/L (ref 97–108)
CO2 SERPL-SCNC: 25 MMOL/L (ref 21–32)
CREAT SERPL-MCNC: 0.78 MG/DL (ref 0.55–1.02)
DIFFERENTIAL METHOD BLD: ABNORMAL
EOSINOPHIL # BLD: 0 K/UL (ref 0–0.4)
EOSINOPHIL NFR BLD: 0 % (ref 0–7)
ERYTHROCYTE [DISTWIDTH] IN BLOOD BY AUTOMATED COUNT: 14.7 % (ref 11.5–14.5)
GLOBULIN SER CALC-MCNC: 3.4 G/DL (ref 2–4)
GLUCOSE SERPL-MCNC: 131 MG/DL (ref 65–100)
HCT VFR BLD AUTO: 32 % (ref 35–47)
HGB BLD-MCNC: 10.3 G/DL (ref 11.5–16)
IMM GRANULOCYTES # BLD AUTO: 0.1 K/UL (ref 0–0.04)
IMM GRANULOCYTES NFR BLD AUTO: 1 % (ref 0–0.5)
LYMPHOCYTES # BLD: 1.2 K/UL (ref 0.8–3.5)
LYMPHOCYTES NFR BLD: 11 % (ref 12–49)
MCH RBC QN AUTO: 28.3 PG (ref 26–34)
MCHC RBC AUTO-ENTMCNC: 32.2 G/DL (ref 30–36.5)
MCV RBC AUTO: 87.9 FL (ref 80–99)
MONOCYTES # BLD: 0.8 K/UL (ref 0–1)
MONOCYTES NFR BLD: 7 % (ref 5–13)
NEUTS SEG # BLD: 8.7 K/UL (ref 1.8–8)
NEUTS SEG NFR BLD: 80 % (ref 32–75)
NRBC # BLD: 0 K/UL (ref 0–0.01)
NRBC BLD-RTO: 0 PER 100 WBC
PLATELET # BLD AUTO: 130 K/UL (ref 150–400)
PMV BLD AUTO: 11.4 FL (ref 8.9–12.9)
POTASSIUM SERPL-SCNC: 3.7 MMOL/L (ref 3.5–5.1)
PROT SERPL-MCNC: 6.9 G/DL (ref 6.4–8.2)
RBC # BLD AUTO: 3.64 M/UL (ref 3.8–5.2)
SODIUM SERPL-SCNC: 140 MMOL/L (ref 136–145)
WBC # BLD AUTO: 10.9 K/UL (ref 3.6–11)

## 2022-03-16 PROCEDURE — 74011250636 HC RX REV CODE- 250/636: Performed by: EMERGENCY MEDICINE

## 2022-03-16 PROCEDURE — 70450 CT HEAD/BRAIN W/O DYE: CPT

## 2022-03-16 PROCEDURE — 72125 CT NECK SPINE W/O DYE: CPT

## 2022-03-16 PROCEDURE — 36415 COLL VENOUS BLD VENIPUNCTURE: CPT

## 2022-03-16 PROCEDURE — 74011250637 HC RX REV CODE- 250/637: Performed by: EMERGENCY MEDICINE

## 2022-03-16 PROCEDURE — 85025 COMPLETE CBC W/AUTO DIFF WBC: CPT

## 2022-03-16 PROCEDURE — 96374 THER/PROPH/DIAG INJ IV PUSH: CPT

## 2022-03-16 PROCEDURE — 65270000029 HC RM PRIVATE

## 2022-03-16 PROCEDURE — 80053 COMPREHEN METABOLIC PANEL: CPT

## 2022-03-16 PROCEDURE — 65660000001 HC RM ICU INTERMED STEPDOWN

## 2022-03-16 PROCEDURE — 96375 TX/PRO/DX INJ NEW DRUG ADDON: CPT

## 2022-03-16 PROCEDURE — 99285 EMERGENCY DEPT VISIT HI MDM: CPT

## 2022-03-16 PROCEDURE — 72170 X-RAY EXAM OF PELVIS: CPT

## 2022-03-16 RX ORDER — ONDANSETRON 2 MG/ML
4 INJECTION INTRAMUSCULAR; INTRAVENOUS
Status: COMPLETED | OUTPATIENT
Start: 2022-03-16 | End: 2022-03-16

## 2022-03-16 RX ORDER — OXYBUTYNIN CHLORIDE 5 MG/1
5 TABLET ORAL 2 TIMES DAILY
COMMUNITY
End: 2022-03-25

## 2022-03-16 RX ORDER — ACETAMINOPHEN 325 MG/1
650 TABLET ORAL ONCE
Status: COMPLETED | OUTPATIENT
Start: 2022-03-16 | End: 2022-03-16

## 2022-03-16 RX ORDER — MORPHINE SULFATE 2 MG/ML
2 INJECTION, SOLUTION INTRAMUSCULAR; INTRAVENOUS ONCE
Status: COMPLETED | OUTPATIENT
Start: 2022-03-16 | End: 2022-03-16

## 2022-03-16 RX ADMIN — MORPHINE SULFATE 2 MG: 2 INJECTION, SOLUTION INTRAMUSCULAR; INTRAVENOUS at 18:34

## 2022-03-16 RX ADMIN — ACETAMINOPHEN 650 MG: 325 TABLET ORAL at 17:27

## 2022-03-16 RX ADMIN — ONDANSETRON HYDROCHLORIDE 4 MG: 2 SOLUTION INTRAMUSCULAR; INTRAVENOUS at 18:34

## 2022-03-16 NOTE — ED NOTES
The pt reports that she feels 2 weeks ago and is having severe pain while walking. The pt's son reports the pt was walking normal yesterday without complaint. The pt has dementia and lives home alone. The pt's family lives [place] and visit the family daily. The pt is non complaint with most home meds.

## 2022-03-16 NOTE — ED TRIAGE NOTES
The pt's family arrived and report the pt was walking normal yesterday but complaint of severe pain with walking today.

## 2022-03-16 NOTE — ED TRIAGE NOTES
The pt is reported to have dementia and fells last week. The pt complains of pain to both hips. The pt was not ambulatory on scene. The pt reports the (R) hip hurts worse and nose pain. The pt tripped over [facility] vent.

## 2022-03-16 NOTE — ED PROVIDER NOTES
82-year-old female history of arthritis, dementia, high cholesterol, hypertension presents to the emergency department by EMS after fall. She fell last week, fell onto her nose. She had bilateral black eyes. This is all since resolved. However she got up this morning with inability to walk, complaining of bilateral hip pain. There is no report of any new falls. Medications brought by the EMS crew are out of date, concern for medication compliance. The history is provided by the patient, medical records and a relative. Hip Injury   This is a new problem. The current episode started 6 to 12 hours ago. The problem occurs constantly. The problem has not changed since onset. The pain is present in the right hip and left hip. The quality of the pain is described as dull. She has tried nothing for the symptoms. There has been a history of trauma.         Past Medical History:   Diagnosis Date    Arthritis     Dementia ([address]     High cholesterol     Hypertension        Past Surgical History:   Procedure Laterality Date    HX ORTHOPAEDIC      GA CARDIAC SURG PROCEDURE UNLIST           Family History:   Problem Relation Age of Onset    Dementia Other     Heart Disease Other        Social History     Socioeconomic History    Marital status:      Spouse name: Not on file    Number of children: Not on file    Years of education: Not on file    Highest education level: Not on file   Occupational History    Not on file   Tobacco Use    Smoking status: Never Smoker    Smokeless tobacco: Never Used   Vaping Use    Vaping Use: Never used   Substance and Sexual Activity    Alcohol use: No    Drug use: Never    Sexual activity: Not on file   Other Topics Concern    Not on file   Social History Narrative    Not on file     Social Determinants of Health     Financial Resource Strain:     Difficulty of Paying Living Expenses: Not on file   Food Insecurity:     Worried About [address] in the Last Year: Not on file    Ran Out of Food in the Last Year: Not on file   Transportation Needs:     Lack of Transportation (Medical): Not on file    Lack of Transportation (Non-Medical): Not on file   Physical Activity:     Days of Exercise per Week: Not on file    Minutes of Exercise per Session: Not on file   Stress:     Feeling of Stress : Not on file   Social Connections:     Frequency of Communication with Friends and Family: Not on file    Frequency of Social Gatherings with Friends and Family: Not on file    Attends Religion Services: Not on file    Active Member of [address] [place] [address] or Organizations: Not on file    Attends Club or Organization Meetings: Not on file    Marital Status: Not on file   Intimate Partner Violence:     Fear of Current or Ex-Partner: Not on file    Emotionally Abused: Not on file    Physically Abused: Not on file    Sexually Abused: Not on file   Housing Stability:     Unable to Pay for Housing in the Last Year: Not on file    Number of [place] in the Last Year: Not on file    Unstable Housing in the Last Year: Not on file         ALLERGIES: Omeprazole    Review of Systems   Constitutional: Negative for fatigue and fever. HENT: Negative for sneezing and sore throat. Respiratory: Negative for cough and shortness of breath. Cardiovascular: Negative for chest pain and leg swelling. Gastrointestinal: Negative for abdominal pain, diarrhea, nausea and vomiting. Genitourinary: Negative for difficulty urinating and dysuria. Musculoskeletal: Negative for arthralgias and myalgias. Skin: Negative for color change and rash. Neurological: Negative for weakness and headaches. Psychiatric/Behavioral: Negative for agitation and behavioral problems. Vitals:    03/16/22 1611   BP: (!) 182/81   Pulse: 73   Resp: 18   Temp: 99.7 °F (37.6 °C)   SpO2: 95%   Weight: 70.3 kg (155 lb)            Physical Exam  Vitals and nursing note reviewed.    Constitutional: General: She is not in acute distress. Appearance: Normal appearance. She is well-developed. She is not ill-appearing, toxic-appearing or diaphoretic. HENT:      Head: Normocephalic and atraumatic. Nose: Nose normal.      Mouth/Throat:      Mouth: Mucous membranes are moist.      Pharynx: Oropharynx is clear. Eyes:      Extraocular Movements: Extraocular movements intact. Conjunctiva/sclera: Conjunctivae normal.      Pupils: Pupils are equal, round, and reactive to light. Cardiovascular:      Rate and Rhythm: Normal rate and regular rhythm. Pulses: Normal pulses. Heart sounds: Normal heart sounds. Pulmonary:      Effort: Pulmonary effort is normal. No respiratory distress. Breath sounds: Normal breath sounds. No wheezing. Chest:      Chest wall: No tenderness. Abdominal:      General: Abdomen is flat. There is no distension. Palpations: Abdomen is soft. Tenderness: There is no abdominal tenderness. There is no guarding or rebound. Musculoskeletal:         General: No swelling, tenderness, deformity or signs of injury. Normal range of motion. Cervical back: Normal range of motion and neck supple. No rigidity. No muscular tenderness. Right lower leg: No edema. Left lower leg: No edema. Skin:     General: Skin is warm and dry. Capillary Refill: Capillary refill takes less than 2 seconds. Neurological:      General: No focal deficit present. Mental Status: She is alert and oriented to person, place, and time. Psychiatric:         Mood and Affect: Mood normal.         Behavior: Behavior normal.          MDM  Number of Diagnoses or Management Options  Diagnosis management comments: 82-year-old female presents as above with new gait disturbance in the setting of potential C-spine injury. Discussed with neurosurgery who recommends MRI, admission to [facility].        Amount and/or Complexity of Data Reviewed  Tests in the radiology section of CPT®: reviewed      ED Course as of 03/16/22 1826   Wed Mar 16, 2022   1820 Dr. Edson Streeter, neurosurgery. Needs MRI. Hospitalist admission.  [JM]      ED Course User Index  [JM] Emily Lafleur MD       Procedures

## 2022-03-17 LAB
ALBUMIN SERPL-MCNC: 3.1 G/DL (ref 3.5–5)
ALBUMIN/GLOB SERPL: 1.1 {RATIO} (ref 1.1–2.2)
ALP SERPL-CCNC: 80 U/L (ref 45–117)
ALT SERPL-CCNC: 22 U/L (ref 12–78)
ANION GAP SERPL CALC-SCNC: 4 MMOL/L (ref 5–15)
AST SERPL-CCNC: 20 U/L (ref 15–37)
BASE DEFICIT BLD-SCNC: 0.2 MMOL/L
BILIRUB SERPL-MCNC: 1.2 MG/DL (ref 0.2–1)
BUN SERPL-MCNC: 31 MG/DL (ref 6–20)
BUN/CREAT SERPL: 52 (ref 12–20)
CALCIUM SERPL-MCNC: 8.7 MG/DL (ref 8.5–10.1)
CHLORIDE SERPL-SCNC: 109 MMOL/L (ref 97–108)
CHOLEST SERPL-MCNC: 230 MG/DL
CK SERPL-CCNC: 76 U/L (ref 26–192)
CO2 SERPL-SCNC: 26 MMOL/L (ref 21–32)
CREAT SERPL-MCNC: 0.6 MG/DL (ref 0.55–1.02)
ERYTHROCYTE [DISTWIDTH] IN BLOOD BY AUTOMATED COUNT: 14.9 % (ref 11.5–14.5)
EST. AVERAGE GLUCOSE BLD GHB EST-MCNC: 108 MG/DL
FOLATE SERPL-MCNC: 7.9 NG/ML (ref 5–21)
GLOBULIN SER CALC-MCNC: 2.9 G/DL (ref 2–4)
GLUCOSE SERPL-MCNC: 90 MG/DL (ref 65–100)
HBA1C MFR BLD: 5.4 % (ref 4–5.6)
HCO3 BLD-SCNC: 23.2 MMOL/L (ref 22–26)
HCT VFR BLD AUTO: 32.7 % (ref 35–47)
HDLC SERPL-MCNC: 56 MG/DL
HDLC SERPL: 4.1 {RATIO} (ref 0–5)
HGB BLD-MCNC: 10.2 G/DL (ref 11.5–16)
LDLC SERPL CALC-MCNC: 153.8 MG/DL (ref 0–100)
MCH RBC QN AUTO: 28.3 PG (ref 26–34)
MCHC RBC AUTO-ENTMCNC: 31.2 G/DL (ref 30–36.5)
MCV RBC AUTO: 90.8 FL (ref 80–99)
NRBC # BLD: 0 K/UL (ref 0–0.01)
NRBC BLD-RTO: 0 PER 100 WBC
PCO2 BLD: 32.7 MMHG (ref 35–45)
PH BLD: 7.46 [PH] (ref 7.35–7.45)
PLATELET # BLD AUTO: 120 K/UL (ref 150–400)
PMV BLD AUTO: 11.5 FL (ref 8.9–12.9)
PO2 BLD: 55 MMHG (ref 80–100)
POTASSIUM SERPL-SCNC: 4.1 MMOL/L (ref 3.5–5.1)
PROT SERPL-MCNC: 6 G/DL (ref 6.4–8.2)
RBC # BLD AUTO: 3.6 M/UL (ref 3.8–5.2)
SAO2 % BLD: 90.1 % (ref 92–97)
SODIUM SERPL-SCNC: 139 MMOL/L (ref 136–145)
SPECIMEN TYPE: ABNORMAL
T4 FREE SERPL-MCNC: 1.1 NG/DL (ref 0.8–1.5)
TRIGL SERPL-MCNC: 101 MG/DL (ref ?–150)
TROPONIN-HIGH SENSITIVITY: 103 NG/L (ref 0–51)
TROPONIN-HIGH SENSITIVITY: 60 NG/L (ref 0–51)
TSH SERPL DL<=0.05 MIU/L-ACNC: 3.92 UIU/ML (ref 0.36–3.74)
VIT B12 SERPL-MCNC: 214 PG/ML (ref 193–986)
VLDLC SERPL CALC-MCNC: 20.2 MG/DL
WBC # BLD AUTO: 10.2 K/UL (ref 3.6–11)

## 2022-03-17 PROCEDURE — 83036 HEMOGLOBIN GLYCOSYLATED A1C: CPT

## 2022-03-17 PROCEDURE — 80053 COMPREHEN METABOLIC PANEL: CPT

## 2022-03-17 PROCEDURE — 74011250637 HC RX REV CODE- 250/637: Performed by: INTERNAL MEDICINE

## 2022-03-17 PROCEDURE — 85027 COMPLETE CBC AUTOMATED: CPT

## 2022-03-17 PROCEDURE — 65270000029 HC RM PRIVATE

## 2022-03-17 PROCEDURE — 82746 ASSAY OF FOLIC ACID SERUM: CPT

## 2022-03-17 PROCEDURE — 82607 VITAMIN B-12: CPT

## 2022-03-17 PROCEDURE — 84439 ASSAY OF FREE THYROXINE: CPT

## 2022-03-17 PROCEDURE — 74011250637 HC RX REV CODE- 250/637: Performed by: STUDENT IN AN ORGANIZED HEALTH CARE EDUCATION/TRAINING PROGRAM

## 2022-03-17 PROCEDURE — 94760 N-INVAS EAR/PLS OXIMETRY 1: CPT

## 2022-03-17 PROCEDURE — 82550 ASSAY OF CK (CPK): CPT

## 2022-03-17 PROCEDURE — 84484 ASSAY OF TROPONIN QUANT: CPT

## 2022-03-17 PROCEDURE — 84443 ASSAY THYROID STIM HORMONE: CPT

## 2022-03-17 PROCEDURE — 82803 BLOOD GASES ANY COMBINATION: CPT

## 2022-03-17 PROCEDURE — 77010033678 HC OXYGEN DAILY

## 2022-03-17 PROCEDURE — 77030040361 HC SLV COMPR DVT MDII -B

## 2022-03-17 PROCEDURE — 99223 1ST HOSP IP/OBS HIGH 75: CPT | Performed by: PSYCHIATRY & NEUROLOGY

## 2022-03-17 PROCEDURE — 36415 COLL VENOUS BLD VENIPUNCTURE: CPT

## 2022-03-17 PROCEDURE — 80061 LIPID PANEL: CPT

## 2022-03-17 PROCEDURE — 51798 US URINE CAPACITY MEASURE: CPT

## 2022-03-17 RX ORDER — ACETAMINOPHEN 325 MG/1
650 TABLET ORAL
Status: DISCONTINUED | OUTPATIENT
Start: 2022-03-17 | End: 2022-03-25 | Stop reason: HOSPADM

## 2022-03-17 RX ORDER — OXYBUTYNIN CHLORIDE 5 MG/1
5 TABLET ORAL 2 TIMES DAILY
Status: DISCONTINUED | OUTPATIENT
Start: 2022-03-17 | End: 2022-03-23

## 2022-03-17 RX ORDER — LISINOPRIL 10 MG/1
10 TABLET ORAL DAILY
Status: DISCONTINUED | OUTPATIENT
Start: 2022-03-17 | End: 2022-03-25

## 2022-03-17 RX ORDER — MEMANTINE HYDROCHLORIDE 5 MG/1
5 TABLET ORAL 2 TIMES DAILY
Status: DISCONTINUED | OUTPATIENT
Start: 2022-03-17 | End: 2022-03-25 | Stop reason: HOSPADM

## 2022-03-17 RX ORDER — SERTRALINE HYDROCHLORIDE 50 MG/1
25 TABLET, FILM COATED ORAL DAILY
Status: DISCONTINUED | OUTPATIENT
Start: 2022-03-17 | End: 2022-03-25 | Stop reason: HOSPADM

## 2022-03-17 RX ORDER — RISPERIDONE 1 MG/1
1 TABLET, FILM COATED ORAL 2 TIMES DAILY
Status: DISCONTINUED | OUTPATIENT
Start: 2022-03-17 | End: 2022-03-25 | Stop reason: HOSPADM

## 2022-03-17 RX ORDER — HYDROCODONE BITARTRATE AND ACETAMINOPHEN 5; 325 MG/1; MG/1
1 TABLET ORAL
Status: DISCONTINUED | OUTPATIENT
Start: 2022-03-17 | End: 2022-03-25 | Stop reason: HOSPADM

## 2022-03-17 RX ADMIN — RISPERIDONE 1 MG: 1 TABLET, FILM COATED ORAL at 17:28

## 2022-03-17 RX ADMIN — ACETAMINOPHEN 650 MG: 325 TABLET ORAL at 00:52

## 2022-03-17 RX ADMIN — ACETAMINOPHEN 650 MG: 325 TABLET ORAL at 11:46

## 2022-03-17 RX ADMIN — OXYBUTYNIN CHLORIDE 5 MG: 5 TABLET ORAL at 17:28

## 2022-03-17 RX ADMIN — LISINOPRIL 10 MG: 20 TABLET ORAL at 09:55

## 2022-03-17 RX ADMIN — MEMANTINE HYDROCHLORIDE 5 MG: 5 TABLET, FILM COATED ORAL at 17:28

## 2022-03-17 RX ADMIN — SERTRALINE 25 MG: 50 TABLET, FILM COATED ORAL at 09:56

## 2022-03-17 RX ADMIN — MEMANTINE HYDROCHLORIDE 5 MG: 5 TABLET, FILM COATED ORAL at 09:55

## 2022-03-17 RX ADMIN — HYDROCODONE BITARTRATE AND ACETAMINOPHEN 1 TABLET: 5; 325 TABLET ORAL at 17:28

## 2022-03-17 RX ADMIN — OXYBUTYNIN CHLORIDE 5 MG: 5 TABLET ORAL at 09:55

## 2022-03-17 RX ADMIN — RISPERIDONE 1 MG: 1 TABLET, FILM COATED ORAL at 09:56

## 2022-03-17 NOTE — PROGRESS NOTES
Occupational Therapy  03/17/22    Order acknowledged, chart reviewed. Patient with neurosurgery consult who recommends MRI of c-spine to rule out ligamentous injury and extent of stenosis. Patient also noted to have critical troponin this A.M. Will defer OT evaluation for medical stability and follow up as able & appropriate.     Thank you,   Taniya William, OTBAYRON, OTR/L

## 2022-03-17 NOTE — PROGRESS NOTES
Bedside and Verbal shift change report [facility] nurse[facility] by Zeke Mir (offgoing nurse). Report included the following information SBAR, Kardex, Intake/Output, MAR, Recent Results, Cardiac Rhythm A Paced and Dual Neuro Assessment.

## 2022-03-17 NOTE — PROGRESS NOTES
Problem: Falls - Risk of  Goal: *Absence of Falls  Description: Document Misty Sprague Fall Risk and appropriate interventions in the flowsheet. Outcome: Progressing Towards Goal  Note: Fall Risk Interventions:  Mobility Interventions: OT consult for ADLs,Strengthening exercises (ROM-active/passive),Bed/chair exit alarm,Patient to call before getting OOB,Utilize walker, cane, or other assistive device,PT Consult for assist device competence,Communicate number of staff needed for ambulation/transfer    Mentation Interventions: Bed/chair exit alarm,Door open when patient unattended,Gait belt with transfers/ambulation,More frequent rounding,Reorient patient,Update white board,Toileting rounds         Elimination Interventions: Bed/chair exit alarm,Call light in reach,Patient to call for help with toileting needs    History of Falls Interventions: Bed/chair exit alarm,Investigate reason for fall         Problem: Patient Education: Go to Patient Education Activity  Goal: Patient/Family Education  Outcome: Progressing Towards Goal     Problem: Impaired Skin Integrity/Pressure Injury Treatment  Goal: *Improvement of Existing Pressure Injury  Outcome: Progressing Towards Goal  Goal: *Prevention of pressure injury  Description: Document Cooper Scale and appropriate interventions in the flowsheet. Outcome: Progressing Towards Goal  Note: Pressure Injury Interventions:  Sensory Interventions: (P) Assess changes in LOC,Assess need for specialty bed,Avoid rigorous massage over bony prominences,Check visual cues for pain,Discuss PT/OT consult with provider,Float heels,Keep linens dry and wrinkle-free,Maintain/enhance activity level,Monitor skin under medical devices,Pressure redistribution bed/mattress (bed type),Sit a 90-degree angle/use footstool if needed,Turn and reposition approx.  every two hours (pillows and wedges if needed),Use 30-degree side-lying position    Moisture Interventions: (P) Absorbent underpads,Apply protective barrier, creams and emollients,Check for incontinence Q2 hours and as needed,Assess need for specialty bed,Internal/External urinary devices,Maintain skin hydration (lotion/cream),Minimize layers,Offer toileting Q_hr         Mobility Interventions: Pressure redistribution bed/mattress (bed type),Turn and reposition approx.  every two hours(pillow and wedges),PT/OT evaluation    Nutrition Interventions: Document food/fluid/supplement intake,Offer support with meals,snacks and hydration                     Problem: Patient Education: Go to Patient Education Activity  Goal: Patient/Family Education  Outcome: Progressing Towards Goal     Problem: Discharge Planning  Goal: *Discharge to safe environment  Outcome: Progressing Towards Goal  Goal: *Knowledge of medication management  Outcome: Progressing Towards Goal  Goal: *Knowledge of discharge instructions  Outcome: Progressing Towards Goal     Problem: Patient Education: Go to Patient Education Activity  Goal: Patient/Family Education  Outcome: Progressing Towards Goal

## 2022-03-17 NOTE — PROGRESS NOTES
Neurosurgery Progress Note  Viviane Babinski, PA-C    Admit Date: 3/16/2022   LOS: 1 day      Daily Progress Note: 3/17/2022    HPI: Ms. Heather Weiss is a very pleasant 83yo female with a PMH of dementia, HTN, dyslipidemia, and aortic valve replacement. She has a pacemaker and is on coumadin. Patient has had multiple falls over the last few months. She was admitted with back, hip, and leg pain that she had had for roughly 2 weeks since a fall. Patient tells me she fell because her legs have become weak but that today they are better. She notes chronic neck pain as well. CT scan of the neck revealed severe C3-5 canal stenosis. CT head negative for acute processes. Subjective:     Patient resting comfortably in bed in rigid cervical collar. Son is at bedside. Patient is pleasantly confused. She follows commands but is unable to give me much history. She denies chest pain, nausea, vomiting, difficulty swallowing, headache, and dyspnea.      Objective:     Vital signs  Temp (24hrs), Av.7 °F (37.1 °C), Min:98.2 °F (36.8 °C), Max:99.7 °F (37.6 °C)   701 - 1900  In: -   Out: 1   03/15 1901 - 700  In: 340 [P.O.:340]  Out: -     Visit Vitals  BP (!) 117/98 (BP 1 Location: Left upper arm, BP Patient Position: At rest)   Pulse 61   Temp 98.3 °F (36.8 °C)   Resp 18   Ht 5' (1.524 m)   Wt 70.3 kg (154 lb 15.7 oz)   SpO2 93%   Breastfeeding No   BMI 30.27 kg/m²    O2 Flow Rate (L/min): 3 l/min O2 Device: None (Room air)     Output (mL)  Stool: 1 ml (22 0710)  Last Bowel Movement Date: (P) 22 (22 0800)  Unmeasurable Output  Stool Occurrence(s): 1 (22 0710)     Pain control  Pain Assessment  Pain Scale 1: Numeric (0 - 10)  Pain Intensity 1: 3  Pain Location 1: Hip  Pain Orientation 1: Anterior,Posterior,Left,Right  Pain Description 1: Aching,Constant,Throbbing,Shooting  Pain Intervention(s) 1: Medication (see MAR),Repositioned    PT/OT  Gait              Physical Exam:  Gen: No acute distress. Abd: Soft, nontender  Neuro: A&Ox2. Follows commands. Speech clear. PERRL. EOMI. Face symmetric. Posterior neck TTP. SHIN spontaneously. Strength 4+/5 in UE and LE BL. Sensation intact. Reflexes symmetric. Clonus present bilat ankles. +Hoffmans reflex. Babinski reflex absent. Negative drift. Gait deferred. Calves soft and supple; No pain with passive stretch  Skin: warm, dry    24 hour results:    Recent Results (from the past 24 hour(s))   CBC WITH AUTOMATED DIFF    Collection Time: 03/16/22  6:45 PM   Result Value Ref Range    WBC 10.9 3.6 - 11.0 K/uL    RBC 3.64 (L) 3.80 - 5.20 M/uL    HGB 10.3 (L) 11.5 - 16.0 g/dL    HCT 32.0 (L) 35.0 - 47.0 %    MCV 87.9 80.0 - 99.0 FL    MCH 28.3 26.0 - 34.0 PG    MCHC 32.2 30.0 - 36.5 g/dL    RDW 14.7 (H) 11.5 - 14.5 %    PLATELET 687 (L) 128 - 400 K/uL    MPV 11.4 8.9 - 12.9 FL    NRBC 0.0 0.0  WBC    ABSOLUTE NRBC 0.00 0.00 - 0.01 K/uL    NEUTROPHILS 80 (H) 32 - 75 %    LYMPHOCYTES 11 (L) 12 - 49 %    MONOCYTES 7 5 - 13 %    EOSINOPHILS 0 0 - 7 %    BASOPHILS 1 0 - 1 %    IMMATURE GRANULOCYTES 1 (H) 0 - 0.5 %    ABS. NEUTROPHILS 8.7 (H) 1.8 - 8.0 K/UL    ABS. LYMPHOCYTES 1.2 0.8 - 3.5 K/UL    ABS. MONOCYTES 0.8 0.0 - 1.0 K/UL    ABS. EOSINOPHILS 0.0 0.0 - 0.4 K/UL    ABS. BASOPHILS 0.1 0.0 - 0.1 K/UL    ABS. IMM.  GRANS. 0.1 (H) 0.00 - 0.04 K/UL    DF AUTOMATED     METABOLIC PANEL, COMPREHENSIVE    Collection Time: 03/16/22  6:45 PM   Result Value Ref Range    Sodium 140 136 - 145 mmol/L    Potassium 3.7 3.5 - 5.1 mmol/L    Chloride 105 97 - 108 mmol/L    CO2 25 21 - 32 mmol/L    Anion gap 10 5 - 15 mmol/L    Glucose 131 (H) 65 - 100 mg/dL    BUN 27 (H) 6 - 20 MG/DL    Creatinine 0.78 0.55 - 1.02 MG/DL    BUN/Creatinine ratio 35 (H) 12 - 20      GFR est AA >60 >60 ml/min/1.73m2    GFR est non-AA >60 >60 ml/min/1.73m2    Calcium 8.8 8.5 - 10.1 MG/DL    Bilirubin, total 1.5 (H) 0.2 - 1.0 MG/DL    ALT (SGPT) 23 12 - 78 U/L    AST (SGOT) 25 15 - 37 U/L    Alk. phosphatase 81 45 - 117 U/L    Protein, total 6.9 6.4 - 8.2 g/dL    Albumin 3.5 3.5 - 5.0 g/dL    Globulin 3.4 2.0 - 4.0 g/dL    A-G Ratio 1.0 (L) 1.1 - 2.2     CBC W/O DIFF    Collection Time: 03/17/22  6:23 AM   Result Value Ref Range    WBC 10.2 3.6 - 11.0 K/uL    RBC 3.60 (L) 3.80 - 5.20 M/uL    HGB 10.2 (L) 11.5 - 16.0 g/dL    HCT 32.7 (L) 35.0 - 47.0 %    MCV 90.8 80.0 - 99.0 FL    MCH 28.3 26.0 - 34.0 PG    MCHC 31.2 30.0 - 36.5 g/dL    RDW 14.9 (H) 11.5 - 14.5 %    PLATELET 881 (L) 885 - 400 K/uL    MPV 11.5 8.9 - 12.9 FL    NRBC 0.0 0  WBC    ABSOLUTE NRBC 0.00 0.00 - 0.00 K/uL   METABOLIC PANEL, COMPREHENSIVE    Collection Time: 03/17/22  6:23 AM   Result Value Ref Range    Sodium 139 136 - 145 mmol/L    Potassium 4.1 3.5 - 5.1 mmol/L    Chloride 109 (H) 97 - 108 mmol/L    CO2 26 21 - 32 mmol/L    Anion gap 4 (L) 5 - 15 mmol/L    Glucose 90 65 - 100 mg/dL    BUN 31 (H) 6 - 20 MG/DL    Creatinine 0.60 0.55 - 1.02 MG/DL    BUN/Creatinine ratio 52 (H) 12 - 20      GFR est AA >60 >60 ml/min/1.73m2    GFR est non-AA >60 >60 ml/min/1.73m2    Calcium 8.7 8.5 - 10.1 MG/DL    Bilirubin, total 1.2 (H) 0.2 - 1.0 MG/DL    ALT (SGPT) 22 12 - 78 U/L    AST (SGOT) 20 15 - 37 U/L    Alk.  phosphatase 80 45 - 117 U/L    Protein, total 6.0 (L) 6.4 - 8.2 g/dL    Albumin 3.1 (L) 3.5 - 5.0 g/dL    Globulin 2.9 2.0 - 4.0 g/dL    A-G Ratio 1.1 1.1 - 2.2     HEMOGLOBIN A1C WITH EAG    Collection Time: 03/17/22  6:23 AM   Result Value Ref Range    Hemoglobin A1c 5.4 4.0 - 5.6 %    Est. average glucose 108 mg/dL   TSH 3RD GENERATION    Collection Time: 03/17/22  6:23 AM   Result Value Ref Range    TSH 3.92 (H) 0.36 - 3.74 uIU/mL   T4, FREE    Collection Time: 03/17/22  6:23 AM   Result Value Ref Range    T4, Free 1.1 0.8 - 1.5 NG/DL   LIPID PANEL    Collection Time: 03/17/22  6:23 AM   Result Value Ref Range    Cholesterol, total 230 (H) <200 MG/DL    Triglyceride 101 <150 MG/DL    HDL Cholesterol 56 MG/DL    LDL, calculated 153.8 (H) 0 - 100 MG/DL    VLDL, calculated 20.2 MG/DL    CHOL/HDL Ratio 4.1 0.0 - 5.0     VITAMIN B12    Collection Time: 03/17/22  6:23 AM   Result Value Ref Range    Vitamin B12 214 193 - 986 pg/mL   FOLATE    Collection Time: 03/17/22  6:23 AM   Result Value Ref Range    Folate 7.9 5.0 - 21.0 ng/mL   CK    Collection Time: 03/17/22  6:23 AM   Result Value Ref Range    CK 76 26 - 192 U/L   TROPONIN-HIGH SENSITIVITY    Collection Time: 03/17/22  6:23 AM   Result Value Ref Range    Troponin-High Sensitivity 103 (HH) 0 - 51 ng/L   POC G3 - PUL    Collection Time: 03/17/22 11:40 AM   Result Value Ref Range    pH (POC) 7.46 (H) 7.35 - 7.45      pCO2 (POC) 32.7 (L) 35.0 - 45.0 MMHG    pO2 (POC) 55 (L) 80 - 100 MMHG    HCO3 (POC) 23.2 22 - 26 MMOL/L    sO2 (POC) 90.1 (L) 92 - 97 %    Base deficit (POC) 0.2 mmol/L    Specimen type (POC) ARTERIAL     TROPONIN-HIGH SENSITIVITY    Collection Time: 03/17/22 11:44 AM   Result Value Ref Range    Troponin-High Sensitivity 60 (HH) 0 - 51 ng/L      Assessment:     Active Problems:    Spinal stenosis in cervical region (3/16/2022)      Plan:     CT scan cervical spine reveals \"accentuated cervical lordosis and retrolisthesis of C3 and C4 resulting in critical central canal stenosis at C3-4 and C4-5 and mild central canal stenosis  at C2-3. Severe or moderate severe bilateral neural foraminal stenosis at C2-3, C3-4, and  C4-5. No acute fracture. \"    Will need MRI for further evaluation; if unable to get MRI due to pacemaker then will order CT myelogram. Maintain cervical collar in the meantime. Pain control PRN. OK to work with PT/OT. Further recs to come pending MRI results.  Continue to hold coumadin if OK per primary team.    Plan d/w Dr. Yareli Fountain, PA

## 2022-03-17 NOTE — PROGRESS NOTES
BSSR completed. Report given to Adrien Garcia, on coming RN.  Report includes but not limited to SBAR, Kardex, procedures, intake/output, and MAR    .      - Patient rests comfortably in bed  - Patient's bed is locked and in lowest position  - No complaints at this time, all needs and requests addressed  - Call bell with in reach  Rounds with intent completed throughout shift as hourly, scheduled and PRN nursing care

## 2022-03-17 NOTE — PROGRESS NOTES
Found name and number of cardiologist in [facility]. Faxed release of info for PPM and aortic valve replacement after chart reviewed and no detailed info available.

## 2022-03-17 NOTE — ED NOTES
The pt had yellow slipper socks placed and was advised to press call bell when attempting to walk. The pt reports that she understand and would not attempt to walk without staff a bed side.

## 2022-03-17 NOTE — PROGRESS NOTES
Pt has a Critical Result for TROPONIN this a.m. . Lab called at 0740 to say TROPONIN was critically high at 103   Will notify provider

## 2022-03-17 NOTE — CONSULTS
INPATIENT NEUROLOGY CONSULTATION  3/17/2022     Consulted by: Kassie Jefferson MD        Patient ID:  Jyotsna Weir  [id number]  82 y.o.  1935    CC: Fall and weakness    HPI    Lenora Camargo is an 82-year-old woman with a history of dementia/psychosis and hypertension who presents with a fall and subsequent inability to walk. She is being further evaluated for possible C-spine process. Looking at the medical record it looks like she is on Coumadin for aortic valve replacement in the past.  She cannot give me any details about her medications or why she takes them. It seems like she has a pacemaker as well. She is followed by [facility] neurology. She tells me she is feeling better now and can move her legs.       Review of Systems   Unable to perform ROS: Dementia       Past Medical History:   Diagnosis Date    Arthritis     Dementia ([facility] [address]     High cholesterol     Hypertension      Family History   Problem Relation Age of Onset    Dementia Other     Heart Disease Other      Social History     Socioeconomic History    Marital status:      Spouse name: Not on file    Number of children: Not on file    Years of education: Not on file    Highest education level: Not on file   Occupational History    Not on file   Tobacco Use    Smoking status: Never Smoker    Smokeless tobacco: Never Used   Vaping Use    Vaping Use: Never used   Substance and Sexual Activity    Alcohol use: No    Drug use: Never    Sexual activity: Not on file   Other Topics Concern     Service Not Asked    Blood Transfusions Not Asked    Caffeine Concern Not Asked    Occupational Exposure Not Asked    Hobby Hazards Not Asked    Sleep Concern Not Asked    Stress Concern Not Asked    Weight Concern Not Asked    Special Diet Not Asked    Back Care Not Asked    Exercise Not Asked    Bike Helmet Not Asked   Bradley Not Asked    Self-Exams Not Asked   Social History Narrative    Not on file     Social Determinants of Health     Financial Resource Strain:     Difficulty of Paying Living Expenses: Not on file   Food Insecurity:     Worried About Running Out of Food in the Last Year: Not on file    Jessica of Food in the Last Year: Not on file   Transportation Needs:     Lack of Transportation (Medical): Not on file    Lack of Transportation (Non-Medical):  Not on file   Physical Activity:     Days of Exercise per Week: Not on file    Minutes of Exercise per Session: Not on file   Stress:     Feeling of Stress : Not on file   Social Connections:     Frequency of Communication with Friends and Family: Not on file    Frequency of Social Gatherings with Friends and Family: Not on file    Attends Anglican Services: Not on file    Active Member of Clubs or Organizations: Not on file    Attends Club or Organization Meetings: Not on file    Marital Status: Not on file   Intimate Partner Violence:     Fear of Current or Ex-Partner: Not on file    Emotionally Abused: Not on file    Physically Abused: Not on file    Sexually Abused: Not on file   Housing Stability:     Unable to Pay for Housing in the Last Year: Not on file    Number of Places Lived in the Last Year: Not on file    Unstable Housing in the Last Year: Not on file     Current Facility-Administered Medications   Medication Dose Route Frequency    acetaminophen (TYLENOL) tablet 650 mg  650 mg Oral Q4H PRN    lisinopriL (PRINIVIL, ZESTRIL) tablet 10 mg  10 mg Oral DAILY    risperiDONE (RisperDAL) tablet 1 mg  1 mg Oral BID    memantine (NAMENDA) tablet 5 mg  5 mg Oral BID    sertraline (ZOLOFT) tablet 25 mg  25 mg Oral DAILY    oxybutynin (DITROPAN) tablet 5 mg  5 mg Oral BID     Allergies   Allergen Reactions    Omeprazole Angioedema       Visit Vitals  /76 (BP 1 Location: Right upper arm, BP Patient Position: At rest)   Pulse 71   Temp 98.2 °F (36.8 °C)   Resp 18   Wt 122 lb 5.7 oz (55.5 kg)   SpO2 99%   Breastfeeding No   BMI 23.90 kg/m²     Physical Exam  Vitals and nursing note reviewed. Constitutional:       Appearance: Normal appearance. She is normal weight. Cardiovascular:      Rate and Rhythm: Normal rate. Pulmonary:      Effort: Pulmonary effort is normal.   Skin:     General: Skin is warm and dry. Coloration: Skin is pale. Neurological:      Mental Status: She is alert. Neurologic Exam     Mental Status   Elderly woman lying in bed supine with a c-collar in place. She is very pleasant and smiling. She can follow commands. She does not know why she is here but she does know she had a fall. Pupils are equal reactive symmetric with a conjugate gaze, face is grossly symmetric tongue is midline speech is clear  She can activate all extremities supine in bed easily I would grade 4+ strength throughout. She can activate both lower extremities easily. She tells me sensation is intact grossly at the bedside. No abnormal movements. Gait deferred. Lab Results   Component Value Date/Time    WBC 10.2 03/17/2022 06:23 AM    HGB 10.2 (L) 03/17/2022 06:23 AM    HCT 32.7 (L) 03/17/2022 06:23 AM    PLATELET 090 (L) 15/62/8985 06:23 AM    MCV 90.8 03/17/2022 06:23 AM     Lab Results   Component Value Date/Time    Hemoglobin A1c 5.4 03/17/2022 06:23 AM    Glucose 90 03/17/2022 06:23 AM    LDL, calculated 153.8 (H) 03/17/2022 06:23 AM    Creatinine 0.60 03/17/2022 06:23 AM      Lab Results   Component Value Date/Time    Cholesterol, total 230 (H) 03/17/2022 06:23 AM    HDL Cholesterol 56 03/17/2022 06:23 AM    LDL, calculated 153.8 (H) 03/17/2022 06:23 AM    Triglyceride 101 03/17/2022 06:23 AM    CHOL/HDL Ratio 4.1 03/17/2022 06:23 AM     Lab Results   Component Value Date/Time    ALT (SGPT) 22 03/17/2022 06:23 AM    Alk.  phosphatase 80 03/17/2022 06:23 AM    Bilirubin, total 1.2 (H) 03/17/2022 06:23 AM    Albumin 3.1 (L) 03/17/2022 06:23 AM    Protein, total 6.0 (L) 03/17/2022 06:23 AM    INR 2.1 (H) 07/07/2018 02:44 PM    Prothrombin time 20.8 (H) 07/07/2018 02:44 PM    PLATELET 012 (L) 35/64/8384 06:23 AM        CT Results (maximum last 3): Results from [place] encounter on 03/16/22    CT SPINE CERV WO CONT    Narrative  EXAM:  CT CERVICAL SPINE WITHOUT CONTRAST    INDICATION: Cervical pain after fall. COMPARISON: None. CONTRAST:  None. TECHNIQUE: Multislice helical CT of the cervical spine was performed without  intravenous contrast administration. Sagittal and coronal reformats were  generated. CT dose reduction was achieved through use of a standardized  protocol tailored for this examination and automatic exposure control for dose  modulation. FINDINGS:    Heavy bilateral carotid artery calcification is seen. Heavy bilateral vertebral  artery calcification is seen. There is an accentuated cervical lordosis secondary to 2.4 mm retrolisthesis of  C4 relative to C5 and 1 mm retrolisthesis of C3 relative to C4. Disc space  narrowing is most pronounced at C3-4. Disc space narrowing is most pronounced at  C3-4 but also present at C4-5. There is no fracture or compression deformity. The odontoid process is intact. The craniocervical junction is within normal limits. The incidentally imaged soft tissues are within normal limits. C2-C3: The central canal measures 8.7 mm anterior to posterior. There is  moderate severe right and severe left neural foraminal stenosis (series 3, image  28). .    C3-C4: The central canal measures 3.5 mm anterior to posterior. There is severe  bilateral neural foraminal stenosis (series 3, image 34). Nereyda Crumble C4-C5: There is a disc osteophyte complex which narrows the central canal to 5.5  mm anterior to posterior. There is severe bilateral neural foraminal stenosis  (series 3, image 39). Hanover Crumble C5-C6: There is no spinal canal. There is minimal bilateral neural foraminal  stenosis.  The central canal measures 13 mm anterior to posterior (series 3,  image 37).    C6-C7: There is no spinal canal or neural foraminal stenosis. (Series 3, image  46). C7-T1: There is no spinal canal or neural foraminal stenosis. (Series 3, image  50). Impression  Accentuated cervical lordosis and retrolisthesis of C3 and C4 resulting in  critical central canal stenosis at C3-4 and C4-5 and mild central canal stenosis  at C2-3    Severe or moderate severe bilateral neural foraminal stenosis at C2-3, C3-4, and  C4-5    No acute fracture    Incidental bilateral carotid artery and vertebral calcification      CT HEAD WO CONT    Narrative  INDICATION: fall    Exam: Noncontrast CT of the brain is performed with 5 mm collimation. CT dose reduction was achieved with the use of the standardized protocol  tailored for this examination and automatic exposure control for dose  modulation. Adaptive statistical iterative reconstruction (ASIR) was utilized. FINDINGS: There is age-appropriate diffuse cortical atrophy. There is no acute  intracranial hemorrhage, mass, mass effect or herniation. Ventricular system is  normal. The gray-white matter differentiation is well-preserved. The mastoid air  cells are well pneumatized. The visualized paranasal sinuses are normal.    Impression  No acute intracranial hemorrhage, mass or infarct. MRI Results (maximum last 3): Results from [place] encounter on 06/18/09    MRI KNEE W/O CONT    Narrative  Final Report      ICD Codes / Adm. Diagnosis:    /   LEFT KNEE MENISCUS TEAR  Examination:  MRI KNEE WO CON L - [id number] - Jun 18 2009  6:25PM    Accession No:  [id number]  Reason:  LEFT KNEE MENISCUS TEAR    REPORT:  INDICATION: Pain    COMPARISON: None    TECHNIQUE: Axial, coronal, and sagittal noncontrast  MRI of the left  knee  in the T1, T2, gradient echo, , and proton density pulse sequences with and  without fat saturation performed on the open 0.7 Martha magnet.     FINDINGS: Motion artifact obscures fine detail and limits sensitivity for  detecting pathology. No acute fracture or dislocation. There is a trace joint effusion. Subcentimeter fluid collection is present dorsal to the lateral  gastrocnemius myotendinous junction. There is fluid in the prepatellar bursa. The medial collateral ligament and lateral ligamentous complex, including  the posterolateral corner, are intact. Menisci are not well evaluated secondary to motion artifact. However, there  appears to be a horizontal nondisplaced tear of the posterior horn of the  medial meniscus. The anterior and posterior cruciate ligaments are intact. There is  questionable ossification of the proximal PCL. The extensor mechanism is  intact. Mild diffuse thinning of the articular cartilage without focal osteochondral  lesion    Diffuse fatty muscle atrophy. IMPRESSION:  1. Limited by motion artifact. 2. Probable horizontal nondisplaced tear of the posterior horn of the medial  meniscus. 3. Joint effusion and prepatellar bursitis. Interpreting/Reading Doctor: Meredith Or ([id number])  Transcribed: n/a on 06/19/2009  Approved: Meredith Or ([id number])  06/19/2009        Distribution:  Attending Doctor: Denia Villa Doctor: Yazmin Childs II      VAS/US/Carotid Doppler Results (maximum last 3): No results found for this or any previous visit. PET Results (maximum last 3): No results found for this or any previous visit. Assessment and Plan        71-year-old woman who had a fall with subsequent transient bilateral lower extremity weakness which seems to be possibly improving now. She has no headache. I am going to add an MRI brain given the fall and history of Coumadin use and reports of weakness on presentation. Need to evaluate and rule out stroke. C-spine work-up in progress. It seems that her blood thinners are are not being given at this time which is probably reasonable to defer for now until cleared.   Of note, she has a history of psychosis and dementia so frequent gentle redirection. Could have sundowning later today. I will follow-up once imaging has been obtained. This clinical note was dictated with an electronic dictation software that can make unintentional errors. If there are any questions, please contact me directly for clarification.       2 [facility], [address] [place] [address]  3/17/2022

## 2022-03-17 NOTE — PROGRESS NOTES
Physical Therapy Note    PT orders received and acknowledged. Chart reviewed. Patient with neurosurgery consult who recommends MRI of c-spine to rule out ligamentous injury and extent of stenosis. Patient also with critical troponin this A.M. Will defer PT evaluation for medical stability.

## 2022-03-17 NOTE — PROGRESS NOTES
[facility] [facility]  Hospitalist Group                                                                                          Hospitalist Progress Note  Clara Parker MD  Answering service: [phone] or [phone] from in house phone        Date of Service:  3/17/2022  NAME:  Sarmad Sargent  :  1935  MRN:  [id number]      Admission Summary:   Copied form admit: \"    82 y.o. female presents with several days duration of abrupt onset, constant, gradually worsening, severe, nonradiating, sharp, bilateral hip pain is associated bilateral black eyes and happened after a fall. Patient denies exacerbating features  and denies remitting features.     Patient fell last week onto her nose, she is doing just fine, but woke up this morning she is not feeling well, could not walk. Incidental findings on imaging showed cervical stenosis, for which the emergency department consulted neurosurgery, who suggested that the patient be admitted and MRI be completed. 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               I had a face to face encounter with patient on 3/17/2022 at bedside for the following physical exam:     PHYSICAL EXAM:    Visit Vitals  BP (!) 104/56 (BP 1 Location: Left upper arm, BP Patient Position: At rest)   Pulse 64   Temp 98.6 °F (37 °C)   Resp 15   Wt 55.5 kg (122 lb 5.7 oz)   SpO2 99%   Breastfeeding No   BMI 23.90 kg/m²          Physical Exam  Constitutional:       General: She is not in acute distress. Appearance: She is not ill-appearing, toxic-appearing or diaphoretic. HENT:      Head: Normocephalic and atraumatic. Eyes:      General:         Right eye: No discharge. Left eye: No discharge. Pulmonary:      Effort: Pulmonary effort is normal. No respiratory distress. Abdominal:      General: There is no distension. Musculoskeletal:         General: No swelling or deformity. Skin:     General: Skin is warm. Coloration: Skin is not jaundiced or pale. Psychiatric:      Comments: Not interactive                      Data Review:    Review and/or order of clinical lab test      Labs:     Recent Labs     03/16/22  1845   WBC 10.9   HGB 10.3*   HCT 32.0*   *     Recent Labs     03/17/22  0623 03/16/22  1845    140   K 4.1 3.7   * 105   CO2 26 25   BUN 31* 27*   CREA 0.60 0.78   GLU 90 131*   CA 8.7 8.8     Recent Labs     03/17/22  0623 03/16/22  1845   ALT 22 23   AP 80 81   TBILI 1.2* 1.5*   TP 6.0* 6.9   ALB 3.1* 3.5   GLOB 2.9 3.4     No results for input(s): INR, PTP, APTT, INREXT in the last 72 hours. No results for input(s): FE, TIBC, PSAT, FERR in the last 72 hours. No results found for: FOL, RBCF   No results for input(s): PH, PCO2, PO2 in the last 72 hours.   Recent Labs     03/17/22  0623   CPK 76     Lab Results   Component Value Date/Time    Cholesterol, total 230 (H) 03/17/2022 06:23 AM    HDL Cholesterol 56 03/17/2022 06:23 AM    LDL, calculated 153.8 (H) 03/17/2022 06:23 AM    Triglyceride 101 03/17/2022 06:23 AM    CHOL/HDL Ratio 4.1 03/17/2022 06:23 AM     No results found for: GLUCPOC  No results found for: COLOR, APPRN, SPGRU, [address], KRIS, PROTU, GLUCU, JAVAN Cummins, Malina Peterson, ARISTEO, Artist Claudia, EPSU, BACTU, WBCU, RBCU, CASTS, UCRY      Medications Reviewed:     Current Facility-Administered Medications   Medication Dose Route Frequency    acetaminophen (TYLENOL) tablet 650 mg  650 mg Oral Q4H PRN     ______________________________________________________________________  EXPECTED LENGTH OF STAY: - - -  ACTUAL LENGTH OF STAY:          1                 Ben Holden MD

## 2022-03-17 NOTE — CONSULTS
82 s/p fall last week with acute inability to walk yesterday am. Imaging at outside ED showed cervical stenosis on CT, no fractures. Agree with MRI to determine degree of cervical stenosis and possible ligamentous injury. Recommendations when MRI completed.

## 2022-03-17 NOTE — H&P
PLEASE NOTE: I HAVE GENERATED THIS NOTE WITH THE ASSISTANCE OF VOICE-RECOGNITION TECHNOLOGY. PLEASE EXCUSE ANY SPELLING, GRAMMATICAL, AND SYNTAX ERRORS YOU MAY FIND. IF YOU NEED CLARIFICATION ON ANYTHING, PLEASE FEEL FREE TO REACH OUT TO ME.  [address] [facility],[address] [facility]  Hospitalist Group  History and Physical - Dr. Eliezer Alexis    Primary Care Provider: Ab Zafar MD  Date of Service:  See Date Note Was Originated    Chief Complaint: Fall and hip pain    Subjective:     82 y.o. female presents with several days duration of abrupt onset, constant, gradually worsening, severe, nonradiating, sharp, bilateral hip pain is associated bilateral black eyes and happened after a fall. Patient denies exacerbating features  and denies remitting features. Patient fell last week onto her nose, she is doing just fine, but woke up this morning she is not feeling well, could not walk. Incidental findings on imaging showed cervical stenosis, for which the emergency department consulted neurosurgery, who suggested that the patient be admitted and MRI be completed. Review of Systems:  12 point ROS obtained and otherwise negative, except as per HPI and above. Past Medical History:   Diagnosis Date    Arthritis     Dementia ([facility] [address]     High cholesterol     Hypertension       Past Surgical History:   Procedure Laterality Date    HX ORTHOPAEDIC      ND CARDIAC SURG PROCEDURE UNLIST       Prior to Admission medications    Medication Sig Start Date End Date Taking? Authorizing Provider   oxybutynin (DITROPAN) 5 mg tablet Take 5 mg by mouth two (2) times a day. Other, MD Kenney   inhalational spacing device 1 Each by Does Not Apply route as needed for Wheezing. Patient not taking: Reported on 3/16/2022 1/8/22   Luiza Malone MD   albuterol (PROVENTIL HFA, VENTOLIN HFA, PROAIR HFA) 90 mcg/actuation inhaler Take 2 Puffs by inhalation every four (4) hours as needed for Wheezing.   Patient not taking: Reported on 3/16/2022 1/8/22   Paul BRADSHAW MD   risperiDONE (RisperDAL) 1 mg tablet TAKE 1 TAB BY MOUTH TWO TIMES A DAY. 9/21/21   Tito Craven MD   memantine (NAMENDA) 5 mg tablet Take 1 Tablet by mouth two (2) times a day. 9/21/21   Tito Craven MD   sertraline (ZOLOFT) 25 mg tablet Take 1 Tablet by mouth daily. 9/21/21   Tito Craven MD   furosemide (LASIX) 40 mg tablet Take 40 mg by mouth. Patient not taking: Reported on 3/16/2022    Provider, Historical   pantoprazole (PROTONIX) 40 mg tablet Take 40 mg by mouth. Patient not taking: Reported on 3/16/2022    Provider, Historical   tolterodine ER (DETROL LA) 4 mg ER capsule Take 4 mg by mouth. Patient not taking: Reported on 3/16/2022    Provider, Historical   metoprolol tartrate (LOPRESSOR) 50 mg tablet Take  by mouth two (2) times a day. Patient not taking: Reported on 3/16/2022    Provider, Historical   warfarin (COUMADIN) 5 mg tablet Take 5 mg by mouth daily. Patient not taking: Reported on 3/16/2022    Other, MD Kenney   lisinopril (PRINIVIL, ZESTRIL) 10 mg tablet Take 10 mg by mouth daily. Other, MD Kenney   atorvastatin (LIPITOR) 40 mg tablet Take 40 mg by mouth daily. Patient not taking: Reported on 3/16/2022    Other, MD Kenney     Allergies   Allergen Reactions    Omeprazole Angioedema      Family History   Problem Relation Age of Onset    Dementia Other     Heart Disease Other    . Social History     Socioeconomic History    Marital status:    Tobacco Use    Smoking status: Never Smoker    Smokeless tobacco: Never Used   Vaping Use    Vaping Use: Never used   Substance and Sexual Activity    Alcohol use: No    Drug use: Never   .     Objective:     Physical Exam:     VS as below    Const'l:          Normal body habitus, a&o, no acute distress  Head/Neck:       neck supple, no jvd, trachea midline, carotid midline, no cervical/head mass  Eyes:     mane, nonicteric sclera, eom intact  ENT:      auditory acuity grossly intact, no nasal deformity  Cardio:           Regular rate regular rhythm, no murmurs/rubs/gallops, no carotid bruit, normal s1, s2  Pulm:     no accessory muscle use, equal normal breath sounds bilaterally, clear tab  Abd:       S NT ND normal bowel sounds x 4 quadrants, no palpable masses  Derm:     no rashes, no ulcers, no lesions  Extr:      No edema, no cyanosis, no calf tenderness, no varicosities  Neuro:    cn II-XII grossly intact, muscle strength intact, sensation intact  Psych:   mood intact, judgement intact    Data Review: All diagnostic labs and studies have been reviewed. .    Assessment:     Active Problems:    Spinal stenosis in cervical region (3/16/2022)    Hypertension  Hyperlipidemia  Dementia    Plan:     -MRI  -Neurosurgical consult  -Continue home Risperdal  -Continue home Namenda  -Continue home Zoloft  -Continue home Ditropan  -Continue home lisinopril      Code status was discussed with the patient.   Code status entered as per the orders  Signed By: Eagle Serrato DO

## 2022-03-17 NOTE — PROGRESS NOTES
Transition of Care Plan: TBD-home, pending on medical/therapy recommendations    RUR: 13%    PCP F/U: Dr. Andressa Walters    Disposition: TBD-home, pending on medical/therapy recommendations    Transportation: TBD-family    1218: Met with patient at bedside. Introduced self and role of CM. A&O x 3. Unable to explain why she was in the hospital. States she lives alone and her son lives next door. Plan for her to return home pending medical and therapy recommendations. Son will transport home if indicated. Will continue to follow    Lilian Walker RN, CRM    Residency: private one story residence, no stairs   Lives With: alone; son lives next door  Prior functioning:  independent with ADLs  Prior DME used: walker  Rehab history: none reported  [facility]     Reason for Admission:  Spinal stenosis in cervical region                   RUR Score: 13% low                    Plan for utilizing home health: TBD-pending therapy recommendations         PCP: First and Last name:  Dr. Andressa Walters     Name of Practice:    Are you a current patient: Yes/No: yes   Approximate date of last visit: 3 months ago   Can you participate in a virtual visit with your PCP: no                    Current Advanced Directive/Advance Care Plan: Full Code    Healthcare Decision Maker:   Click here to complete [address] including selection of the Healthcare Decision Maker Relationship (ie \"Primary\")  Stephany [phone]                  Transition of Care Plan: TBD-home, pending medical/therapy recommendations                     Care Management Interventions  PCP Verified by CM: Yes (Dr. Andressa Walters)  Mode of Transport at Discharge:  Other (see comment) (family)  Physical Therapy Consult: Yes  Occupational Therapy Consult: Yes  Support Systems: Child(keren)  Confirm Follow Up Transport: Family  Discharge Location  Patient Expects to be Discharged to[de-identified] Home (TBD)

## 2022-03-17 NOTE — ROUTINE PROCESS
TRANSFER - OUT REPORT:    Verbal report given to [employer] [facility], RN(name) on Darci Torrez  being transferred to [facility]72(unit) for routine progression of care       Report consisted of patients Situation, Background, Assessment and   Recommendations(SBAR). Information from the following report(s) SBAR was reviewed with the receiving nurse. Lines:   Peripheral IV 03/16/22 Left Forearm (Active)   Site Assessment Clean, dry, & intact 03/16/22 1848   Phlebitis Assessment 0 03/16/22 1848   Infiltration Assessment 0 03/16/22 1848   Dressing Status Clean, dry, & intact 03/16/22 1848   Dressing Type Transparent 03/16/22 1848   Hub Color/Line Status Pink 03/16/22 1848   Action Taken Blood drawn 03/16/22 1848   Alcohol Cap Used Yes 03/16/22 1848        Opportunity for questions and clarification was provided. Patient transported with:   Registered Nurse from [address] [place] [address] [address]

## 2022-03-17 NOTE — PROGRESS NOTES
Patient admitted from ED  via stretcher admitted with injury from fall. Patient is stable and vitals are as follows:  Temp.- 98.4 oral  SpO2- 98% 3 L NC  HR- 65  RR- 20  BP- 143/71    Patient skin has been assessed and pt has been added to telemetry box #[id number] . Provider has been notified of patients arrival to unit. Pt has been oriented to unit and room. Pt's skin is clean, dry and intact. 20g PIV right forearm, diffuse scattered  Bruising through out.  Bony areas padded and prophylactic meplex applied to sacral area

## 2022-03-17 NOTE — PROGRESS NOTES
Spoke with nurse Eli Courser taking care of pt about PPM. We found ot the PPM is MR Conditional from by calling [employer] to see if that was what she had implanted. According to [employer], the generator is Advisa [id number], RV lead 5076-52cm, RA 5076-45cm. Cardiology order form sent to cardiology office in [place]. The last interrogation according to them was 2019. Not sure when or if the order form will be returned. This was relayed to Eli Courser.

## 2022-03-18 LAB
ARTERIAL PATENCY WRIST A: POSITIVE
BASE DEFICIT BLD-SCNC: 2 MMOL/L
BASOPHILS # BLD: 0.1 K/UL (ref 0–0.1)
BASOPHILS NFR BLD: 1 % (ref 0–1)
BDY SITE: ABNORMAL
COMMENT, HOLDF: NORMAL
DIFFERENTIAL METHOD BLD: ABNORMAL
EOSINOPHIL # BLD: 0.9 K/UL (ref 0–0.4)
EOSINOPHIL NFR BLD: 11 % (ref 0–7)
ERYTHROCYTE [DISTWIDTH] IN BLOOD BY AUTOMATED COUNT: 15.2 % (ref 11.5–14.5)
GAS FLOW.O2 O2 DELIVERY SYS: ABNORMAL L/MIN
HCO3 BLD-SCNC: 22.9 MMOL/L (ref 22–26)
HCT VFR BLD AUTO: 30 % (ref 35–47)
HGB BLD-MCNC: 9.3 G/DL (ref 11.5–16)
IMM GRANULOCYTES # BLD AUTO: 0 K/UL (ref 0–0.04)
IMM GRANULOCYTES NFR BLD AUTO: 1 % (ref 0–0.5)
INR PPP: 1.1 (ref 0.9–1.1)
LACTATE SERPL-SCNC: 1.1 MMOL/L (ref 0.4–2)
LYMPHOCYTES # BLD: 1.4 K/UL (ref 0.8–3.5)
LYMPHOCYTES NFR BLD: 17 % (ref 12–49)
MCH RBC QN AUTO: 28.7 PG (ref 26–34)
MCHC RBC AUTO-ENTMCNC: 31 G/DL (ref 30–36.5)
MCV RBC AUTO: 92.6 FL (ref 80–99)
MONOCYTES # BLD: 0.6 K/UL (ref 0–1)
MONOCYTES NFR BLD: 7 % (ref 5–13)
NEUTS SEG # BLD: 5.1 K/UL (ref 1.8–8)
NEUTS SEG NFR BLD: 64 % (ref 32–75)
NRBC # BLD: 0 K/UL (ref 0–0.01)
NRBC BLD-RTO: 0 PER 100 WBC
PCO2 BLD: 38.7 MMHG (ref 35–45)
PH BLD: 7.38 [PH] (ref 7.35–7.45)
PLATELET # BLD AUTO: 124 K/UL (ref 150–400)
PMV BLD AUTO: 11.2 FL (ref 8.9–12.9)
PO2 BLD: 94 MMHG (ref 80–100)
PROTHROMBIN TIME: 11.4 SEC (ref 9–11.1)
RBC # BLD AUTO: 3.24 M/UL (ref 3.8–5.2)
SAMPLES BEING HELD,HOLD: NORMAL
SAO2 % BLD: 97.2 % (ref 92–97)
SPECIMEN TYPE: ABNORMAL
WBC # BLD AUTO: 8 K/UL (ref 3.6–11)

## 2022-03-18 PROCEDURE — 97530 THERAPEUTIC ACTIVITIES: CPT

## 2022-03-18 PROCEDURE — 97165 OT EVAL LOW COMPLEX 30 MIN: CPT

## 2022-03-18 PROCEDURE — 83605 ASSAY OF LACTIC ACID: CPT

## 2022-03-18 PROCEDURE — 65270000029 HC RM PRIVATE

## 2022-03-18 PROCEDURE — 36600 WITHDRAWAL OF ARTERIAL BLOOD: CPT

## 2022-03-18 PROCEDURE — 82803 BLOOD GASES ANY COMBINATION: CPT

## 2022-03-18 PROCEDURE — 74011250636 HC RX REV CODE- 250/636: Performed by: ANESTHESIOLOGY

## 2022-03-18 PROCEDURE — 36415 COLL VENOUS BLD VENIPUNCTURE: CPT

## 2022-03-18 PROCEDURE — 97162 PT EVAL MOD COMPLEX 30 MIN: CPT

## 2022-03-18 PROCEDURE — 74011250637 HC RX REV CODE- 250/637: Performed by: INTERNAL MEDICINE

## 2022-03-18 PROCEDURE — P9045 ALBUMIN (HUMAN), 5%, 250 ML: HCPCS | Performed by: ANESTHESIOLOGY

## 2022-03-18 PROCEDURE — 51798 US URINE CAPACITY MEASURE: CPT

## 2022-03-18 PROCEDURE — 85025 COMPLETE CBC W/AUTO DIFF WBC: CPT

## 2022-03-18 PROCEDURE — 87040 BLOOD CULTURE FOR BACTERIA: CPT

## 2022-03-18 PROCEDURE — 85610 PROTHROMBIN TIME: CPT

## 2022-03-18 PROCEDURE — 74011250636 HC RX REV CODE- 250/636: Performed by: NURSE PRACTITIONER

## 2022-03-18 PROCEDURE — 74011250637 HC RX REV CODE- 250/637: Performed by: STUDENT IN AN ORGANIZED HEALTH CARE EDUCATION/TRAINING PROGRAM

## 2022-03-18 PROCEDURE — 74011250636 HC RX REV CODE- 250/636: Performed by: INTERNAL MEDICINE

## 2022-03-18 RX ORDER — ALBUMIN HUMAN 50 G/1000ML
25 SOLUTION INTRAVENOUS ONCE
Status: COMPLETED | OUTPATIENT
Start: 2022-03-18 | End: 2022-03-18

## 2022-03-18 RX ORDER — SODIUM CHLORIDE 9 MG/ML
75 INJECTION, SOLUTION INTRAVENOUS CONTINUOUS
Status: DISCONTINUED | OUTPATIENT
Start: 2022-03-18 | End: 2022-03-18

## 2022-03-18 RX ORDER — MIDODRINE HYDROCHLORIDE 5 MG/1
5 TABLET ORAL 2 TIMES DAILY WITH MEALS
Status: DISCONTINUED | OUTPATIENT
Start: 2022-03-18 | End: 2022-03-25 | Stop reason: HOSPADM

## 2022-03-18 RX ADMIN — SERTRALINE 25 MG: 50 TABLET, FILM COATED ORAL at 09:00

## 2022-03-18 RX ADMIN — ALBUMIN (HUMAN) 25 G: 12.5 INJECTION, SOLUTION INTRAVENOUS at 12:02

## 2022-03-18 RX ADMIN — MEMANTINE HYDROCHLORIDE 5 MG: 5 TABLET, FILM COATED ORAL at 08:56

## 2022-03-18 RX ADMIN — SODIUM CHLORIDE 500 ML: 9 INJECTION, SOLUTION INTRAVENOUS at 08:56

## 2022-03-18 RX ADMIN — SODIUM CHLORIDE 75 ML/HR: 9 INJECTION, SOLUTION INTRAVENOUS at 06:58

## 2022-03-18 RX ADMIN — WARFARIN SODIUM 2.5 MG: 2 TABLET ORAL at 17:24

## 2022-03-18 RX ADMIN — RISPERIDONE 1 MG: 1 TABLET, FILM COATED ORAL at 17:24

## 2022-03-18 RX ADMIN — OXYBUTYNIN CHLORIDE 5 MG: 5 TABLET ORAL at 17:24

## 2022-03-18 RX ADMIN — RISPERIDONE 1 MG: 1 TABLET, FILM COATED ORAL at 08:56

## 2022-03-18 RX ADMIN — SODIUM CHLORIDE 250 ML: 9 INJECTION, SOLUTION INTRAVENOUS at 03:03

## 2022-03-18 RX ADMIN — HYDROCODONE BITARTRATE AND ACETAMINOPHEN 1 TABLET: 5; 325 TABLET ORAL at 00:23

## 2022-03-18 RX ADMIN — MEMANTINE HYDROCHLORIDE 5 MG: 5 TABLET, FILM COATED ORAL at 17:24

## 2022-03-18 RX ADMIN — OXYBUTYNIN CHLORIDE 5 MG: 5 TABLET ORAL at 08:56

## 2022-03-18 RX ADMIN — MIDODRINE HYDROCHLORIDE 5 MG: 5 TABLET ORAL at 12:46

## 2022-03-18 NOTE — PROGRESS NOTES
MRI SAFETY UPDATE:    Request faxed yesterday for MRI pacemaker settings from cardiologist. Recvd no response. Called today & got alternate fax # so faxed stat request to cardiologist again at that # & they are expecting it. Will update when response recvd.     Chauncey Verdin, RN  [facility] MRI  [phone]

## 2022-03-18 NOTE — PROGRESS NOTES
Problem: Mobility Impaired (Adult and Pediatric)  Goal: *Acute Goals and Plan of Care (Insert Text)  Description: FUNCTIONAL STATUS PRIOR TO ADMISSION: Patient was independent and active without use of DME. Per chart review, pt with recent fall ~ 2 weeks ago which resulted in this admission. Pt with baseline dementia and questionable historian to provide prior level of function    HOME SUPPORT PRIOR TO ADMISSION: The patient lived alone with family that lived next door that checked on her daily    Physical Therapy Goals  Initiated 3/18/2022  1. Patient will move from supine to sit and sit to supine  and roll side to side in bed with moderate assistance  within 7 day(s). 2.  Patient will transfer from bed to chair and chair to bed with maximal assistance using the least restrictive device within 7 day(s). 3.  Patient will perform sit to stand with maximal assistance within 7 day(s). 4.  Patient will ambulate with maximal assistance for 5 feet with the least restrictive device within 7 day(s). Outcome: Not Progressing Towards Goal   PHYSICAL THERAPY EVALUATION  Patient: Thaddeus Childs (82 y.o. female)  Date: 3/18/2022  Primary Diagnosis: Spinal stenosis in cervical region [M48.02]        Precautions:   Fall (C-collar)      ASSESSMENT  Based on the objective data described below, the patient presents with confusion, generalized weakness, decreased functional mobility, decreased tolerance to activity, impaired coordination, hypotension at rest s/p admission on 3/16 with history of a recent fall and difficulty mobilizing. Head CT and C-spine negative for acute insult. Awaiting Brain and C-spine MRIs. Pt received supine in bed with c-collar in place. Pt oriented to self only. Pt required max A x 2 repositioning and scooting in the bed. Pt reported pain at R hip/pelvis with mobility (x-rays negative). BP decreased with HOB elevated and further mobility to EOB deferred at this time.  Pt will continue to benefit from PT to progress mobility as tolerated. Recommend SNF placement upon discharge to continue therapy efforts. 03/18/22 0953 03/18/22 0958 03/18/22 1001   BP: (!) 89/50 (!) 79/69 (!) 101/55   BP 1 Location: Left upper arm Left upper arm Left upper arm   BP Patient Position: At rest;Supine Comment: HOB elevated 60 degrees At rest  Comment: HOB elevated 45 degrees   Pulse: 60 61 60   SpO2: 99% 98% 98%        Current Level of Function Impacting Discharge (mobility/balance): max A x 2 repositioning and scooting    Functional Outcome Measure: The patient scored Total: 10/100 on the Barthel Index outcome measure which is indicative of being totally dependent in basic self-care. Other factors to consider for discharge: previously living alone, history of dementia and falls     Patient will benefit from skilled therapy intervention to address the above noted impairments. PLAN :  Recommendations and Planned Interventions: bed mobility training, transfer training, gait training, therapeutic exercises, neuromuscular re-education, patient and family training/education, and therapeutic activities      Frequency/Duration: Patient will be followed by physical therapy:  3 times a week to address goals. Recommendation for discharge: (in order for the patient to meet his/her long term goals)  Therapy up to 5 days/week in SNF setting    This discharge recommendation:  Has been made in collaboration with the attending provider and/or case management    IF patient discharges home will need the following DME: to be determined (TBD)         SUBJECTIVE:   Patient stated I like country music.     OBJECTIVE DATA SUMMARY:   HISTORY:    Past Medical History:   Diagnosis Date    Arthritis     Dementia ([address]     High cholesterol     Hypertension      Past Surgical History:   Procedure Laterality Date    HX ORTHOPAEDIC      HX PACEMAKER      TN CARDIAC SURG PROCEDURE UNLIST      Aortic Valve Replacement       Personal factors and/or comorbidities impacting plan of care:     Home Situation  Home Environment: Private residence  # Steps to Enter: 2  One/Two Story Residence: One story  Living Alone: Yes  Support Systems: Child(keren)  Patient Expects to be Discharged to[de-identified] Home (TBD)  Current DME Used/Available at Home: Walker, rolling  Tub or Shower Type: Shower (with built-in seat)    EXAMINATION/PRESENTATION/DECISION MAKING:   Critical Behavior:  Neurologic State: Alert,Confused  Orientation Level: Oriented to person,Oriented to place,Disoriented to situation,Disoriented to time  Cognition: Follows commands     Hearing: Auditory  Auditory Impairment: None  Skin:    Edema:   Range Of Motion:  AROM: Generally decreased, functional                       Strength:    Strength: Generally decreased, functional                    Tone & Sensation:   Tone: Normal              Sensation: Intact               Coordination:  Coordination: Generally decreased, functional  Vision:      Functional Mobility:  Bed Mobility:  Rolling: Maximum assistance;Assist x2        Scooting: Maximum assistance;Assist x2 (scooting towards HOB)  Transfers:                             Balance:   Sitting: Impaired; With support (with support of HOB elevated)  Ambulation/Gait Training:               Therapeutic Exercises: Ankle pumps, knee flexion    Functional Measure:  Barthel Index:    Bathin  Bladder: 0  Bowels: 5  Groomin  Dressin  Feedin  Mobility: 0  Stairs: 0  Toilet Use: 0  Transfer (Bed to Chair and Back): 0  Total: 10/100       The Barthel ADL Index: Guidelines  1. The index should be used as a record of what a patient does, not as a record of what a patient could do. 2. The main aim is to establish degree of independence from any help, physical or verbal, however minor and for whatever reason. 3. The need for supervision renders the patient not independent. 4. A patient's performance should be established using the best available evidence. Asking the patient, friends/relatives and nurses are the usual sources, but direct observation and common sense are also important. However direct testing is not needed. 5. Usually the patient's performance over the preceding 24-48 hours is important, but occasionally longer periods will be relevant. 6. Middle categories imply that the patient supplies over 50 per cent of the effort. 7. Use of aids to be independent is allowed. Score Interpretation (from [facility])    Independent   60-79 Minimally independent   40-59 Partially dependent   20-39 Very dependent   <20 Totally dependent     -Mona Haro., Barthel, D.W. (1965). Functional evaluation: the Barthel Index. [address] ([address] (1997). The Barthel activities of daily living index: self-reporting versus actual performance in the old (> or = 75 years). Journal of [address] [place] [address] 45(7), [facility], BITA, Jessica Andino., Lisa Lujan. (1999). Measuring the change in disability after inpatient rehabilitation; comparison of the responsiveness of the Barthel Index and Functional [place] Measure. Journal of Neurology, Neurosurgery, and Psychiatry, 66(4), 799-542. Eliana Bellamy, N.J.A, HARMEET Burns, & Rossana Arellano MMichA. (2004) Assessment of post-stroke quality of life in cost-effectiveness studies: The usefulness of the Barthel Index and the EuroQoL-5D. Quality of Life Research, 13, 767-77           Based on the above components, the patient evaluation is determined to be of the following complexity level: MEDIUM    Pain Rating:  Pt reported R hip/pelvic pain with activity    Activity Tolerance:   Good    After treatment patient left in no apparent distress:   Supine in bed, Call bell within reach, Bed / chair alarm activated, and Side rails x 3    COMMUNICATION/EDUCATION:   The patients plan of care was discussed with: Occupational therapist and Registered nurse. Fall prevention education was provided and the patient/caregiver indicated understanding., Patient/family have participated as able in goal setting and plan of care. , and Patient/family agree to work toward stated goals and plan of care.     Thank you for this referral.  Edison Hester, PT, DPT   Time Calculation: 17 mins

## 2022-03-18 NOTE — PROGRESS NOTES
ICU consult note    ICU team consulted for Mrs. Driscilla Mortimer, for low blood pressures. This is a 82-year-old female who presented the morning of 3/17 to [address] after sustaining a fall. She apparently fell last week onto her nose but she woke up the morning of admission not feeling well and could not walk. Imaging in the emergency room she was found to have incidental cervical stenosis in which neurosurgery was consulted and MRI was done. When she is seen here at the bedside she has a cervical collar on and she is conversant. Her past medical history consists of prosthetic valve on Coumadin hypertension hyperlipidemia dementia and now recent fall. Upon speaking to the patient she can tell me the current year, her name, and where she is located. On physical exam she tracks me throughout the room, her tongue is dry and oral mucosa is dry as well, she moves all of her extremities, she has good pulses in her upper and lower extremities, abdomen is soft breath sounds are clear, heart rate is paced. I am assuming this patient is dehydrated, and will benefit from IV fluid administration. I placed an order for 500 cc bolus of 5% albumin. I called and discussed the case with Dr. Robert Shaw, and another option would be to give her a low-dose one-time of midodrine either 5 mg or 10 mg.  I agree with checking a lactate, and a blood gas, though I do not suspect that this is sepsis. Perhaps though she could have a brewing urinary tract infection, would defer to the primary team if wanting to check a urinary specimen. We can keep her in the neurosurgical stepdown unit for now. Keep a close eye on blood pressures. If need be would do more fluid, and midodrine. If her blood pressure remains low despite above measures, or for mentation changes, please give us a call back ASAP and we will come evaluate.     Abigail Martinez, DO   Staff Intensivist/Anesthesiologist  Critical Care Medicine

## 2022-03-18 NOTE — ROUTINE PROCESS
Ms Onelia Hill in [phone] is here for spinal stenosis. pt's BP is 82/48. pt denies any dizziness. Would you like me to give a bolus? . Thanks Steven Nathan

## 2022-03-18 NOTE — PROGRESS NOTES
Pharmacist Note - Warfarin Dosing  Consult provided for this 82 y. o.female to manage warfarin for Tissue Heart Valve    INR Goal: 2 - 3    Drugs that may increase INR: None  Drugs that may decrease INR: None  Other current anticoagulants/ drugs that may increase bleeding risk: None  Risk factors: Age > 65  Daily INR ordered: YES    Recent Labs     03/18/22  1144 03/17/22  0623 03/16/22  1845   HGB 9.3* 10.2* 10.3*   INR 1.1  --   --      Date               INR                  Dose  3/18   1.1   2.5 mg                                                                                Assessment/ Plan: Will order warfarin 2.5 mg PO x 1 dose. Pharmacy will continue to monitor daily and adjust therapy as indicated. Please contact the pharmacist at x [phone] or  for outpatient recommendations if needed.

## 2022-03-18 NOTE — PROGRESS NOTES
0730: Bedside and Verbal shift change report given to Eliana Santos RN (oncoming nurse) by Laurie Corcoran RN (offgoing nurse). Report included the following information SBAR, Kardex, MAR and Recent Results. 0815: Pt bladder scanned, 110 mL found in bladder, has not yet voided, IVF started for decreased UOP, low BPs. Pt states she does not wear oxygen at home. Attempted to trial pt on RA; sats to 88-89%; pt put back on 2 L NC.    1115: Pt's BP low, MAPs ~50s in semi-grajeda's position; pt denied dizziness, no change in previous orientation status, alert. BPs improved slightly in supine position. Armen Denver MD at bedside, received orders for stat ABG, CBC, paired blood cx, lactic acid. RT notified. Blo cx drawn from L FA, L hand, PIV placed to LFA. Patient Vitals for the past 4 hrs:   Temp Pulse Resp BP SpO2   03/18/22 1140  60 16 (!) 95/49 95 %   03/18/22 1135  60 16 (!) 76/37 99 %   03/18/22 1130  60 18 (!) 90/50 99 %   03/18/22 1125  60 16 (!) 76/45 98 %   03/18/22 1120  60 17 (!) 73/42 97 %   03/18/22 1115  60 13 (!) 69/44 97 %   03/18/22 1100 97.4 °F (36.3 °C) 60   99 %   03/18/22 1004  61      03/18/22 1001  60  (!) 101/55 98 %   03/18/22 0958  61  (!) 79/69 98 %   03/18/22 0953  60  (!) 89/50 99 %   03/18/22 0800  60 14 (!) 106/53 95 %       1215: Bedside and Verbal shift change report given to Anabell Davidson RN (oncoming nurse) by Eliana Santos RN (offgoing nurse). Report included the following information SBAR, Kardex, MAR and Recent Results.

## 2022-03-18 NOTE — PROGRESS NOTES
Bedside and Verbal shift change report given to [place] (oncoming nurse) by [facility] RN (offgoing nurse). Report included the following information SBAR, Kardex, Intake/Output, MAR and Recent Results.

## 2022-03-18 NOTE — PROGRESS NOTES
[facility]  Hospitalist Group                                                                                          Hospitalist Progress Note  Angel Dsouza MD  Answering service: [phone] OR [phone] from in house phone        Date of Service:  3/18/2022  NAME:  Bridget Varma  :  1935  MRN:  [id number]      Admission Summary:   Copied form admit: \"    82 y.o. female presents with several days duration of abrupt onset, constant, gradually worsening, severe, nonradiating, sharp, bilateral hip pain is associated bilateral black eyes and happened after a fall. Patient denies exacerbating features  and denies remitting features.     Patient fell last week onto her nose, she is doing just fine, but woke up this morning she is not feeling well, could not walk. Incidental findings on imaging showed cervical stenosis, for which the emergency department consulted neurosurgery, who suggested that the patient be admitted and MRI be completed.  \"    Interval history / Subjective:     3/18/2022 :    Cont in cervical collar   MRI pending   Pt hypotensive through night and am 3/18/2022    ICU intensivist to eval   Septic w/u started   Else:        Assessment & Plan:     Spinal stenosis in cervical region (3/16/2022)  3/17:   Stabilized w cervical collar til needs proven otherwise via MRI cervical neck   3/18:   Cont in cervical collar   MRI pending   Pt hypotensive through night and am 3/18/2022  - not seemingly vol responsive    ICU intensivist to eval   Septic w/u started       Hypertension  - lowish readings , monitor,     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 I had a face to face encounter with patient on 3/18/2022 at bedside for the following physical exam:     PHYSICAL EXAM:    Visit Vitals  BP (!) 101/55 (BP 1 Location: Left upper arm, BP Patient Position: At rest) Comment (BP Patient Position): HOB elevated 45 degrees   Pulse 60   Temp 97.4 °F (36.3 °C)   Resp 14   Ht 5' (1.524 m)   Wt 70.3 kg (154 lb 15.7 oz)   SpO2 99%   Breastfeeding No   BMI 30.27 kg/m²          Physical Exam  Constitutional:       General: She is in acute distress. Appearance: She is ill-appearing. She is not toxic-appearing or diaphoretic. HENT:      Head: Normocephalic and atraumatic. Right Ear: External ear normal.      Left Ear: External ear normal.      Nose: Nose normal.      Mouth/Throat:      Mouth: Mucous membranes are dry. Pharynx: Oropharynx is clear. Eyes:      General:         Right eye: No discharge. Left eye: No discharge. Extraocular Movements: Extraocular movements intact. Conjunctiva/sclera: Conjunctivae normal.      Pupils: Pupils are equal, round, and reactive to light. Cardiovascular:      Rate and Rhythm: Normal rate and regular rhythm. Pulmonary:      Effort: Pulmonary effort is normal. No respiratory distress. Abdominal:      General: Abdomen is flat. There is no distension. Palpations: Abdomen is soft. Musculoskeletal:         General: No swelling or deformity. Cervical back: Normal range of motion and neck supple. Skin:     General: Skin is warm. Coloration: Skin is not jaundiced or pale. Neurological:      General: No focal deficit present. Mental Status: Mental status is at baseline.    Psychiatric:         Mood and Affect: Mood normal.         Behavior: Behavior normal.      Comments: Not interactive                      Data Review:    Review and/or order of clinical lab test      Labs:     Recent Labs 03/17/22 0623 03/16/22  1845   WBC 10.2 10.9   HGB 10.2* 10.3*   HCT 32.7* 32.0*   * 130*     Recent Labs     03/17/22 0623 03/16/22  1845    140   K 4.1 3.7   * 105   CO2 26 25   BUN 31* 27*   CREA 0.60 0.78   GLU 90 131*   CA 8.7 8.8     Recent Labs     03/17/22 0623 03/16/22  1845   ALT 22 23   AP 80 81   TBILI 1.2* 1.5*   TP 6.0* 6.9   ALB 3.1* 3.5   GLOB 2.9 3.4     No results for input(s): INR, PTP, APTT, INREXT, INREXT in the last 72 hours. No results for input(s): FE, TIBC, PSAT, FERR in the last 72 hours. Lab Results   Component Value Date/Time    Folate 7.9 03/17/2022 06:23 AM      No results for input(s): PH, PCO2, PO2 in the last 72 hours.   Recent Labs     03/17/22 0623   CPK 76     Lab Results   Component Value Date/Time    Cholesterol, total 230 (H) 03/17/2022 06:23 AM    HDL Cholesterol 56 03/17/2022 06:23 AM    LDL, calculated 153.8 (H) 03/17/2022 06:23 AM    Triglyceride 101 03/17/2022 06:23 AM    CHOL/HDL Ratio 4.1 03/17/2022 06:23 AM     No results found for: GLUCPOC  No results found for: COLOR, APPRN, SPGRU, REFSG, KRIS, PROTU, GLUCU, KETU, BILU, UROU, ARISTEO, LEUKU, GLUKE, EPSU, BACTU, WBCU, RBCU, CASTS, UCRY      Medications Reviewed:     Current Facility-Administered Medications   Medication Dose Route Frequency    0.9% sodium chloride infusion  75 mL/hr IntraVENous CONTINUOUS    acetaminophen (TYLENOL) tablet 650 mg  650 mg Oral Q4H PRN    [Held by provider] lisinopriL (PRINIVIL, ZESTRIL) tablet 10 mg  10 mg Oral DAILY    risperiDONE (RisperDAL) tablet 1 mg  1 mg Oral BID    memantine (NAMENDA) tablet 5 mg  5 mg Oral BID    sertraline (ZOLOFT) tablet 25 mg  25 mg Oral DAILY    oxybutynin (DITROPAN) tablet 5 mg  5 mg Oral BID    HYDROcodone-acetaminophen (NORCO) 5-325 mg per tablet 1 Tablet  1 Tablet Oral Q6H PRN     ______________________________________________________________________  EXPECTED LENGTH OF STAY: 2d 21h  ACTUAL LENGTH OF STAY: [facility], [place]

## 2022-03-18 NOTE — PROGRESS NOTES
Problem: Falls - Risk of  Goal: *Absence of Falls  Description: Document Koleda Gamma Fall Risk and appropriate interventions in the flowsheet. Outcome: Progressing Towards Goal  Note: Fall Risk Interventions:  Mobility Interventions: Bed/chair exit alarm    Mentation Interventions: Bed/chair exit alarm    Medication Interventions: Bed/chair exit alarm    Elimination Interventions: Call light in reach,Bed/chair exit alarm    History of Falls Interventions: Bed/chair exit alarm         Problem: Patient Education: Go to Patient Education Activity  Goal: Patient/Family Education  Outcome: Progressing Towards Goal     Problem: Impaired Skin Integrity/Pressure Injury Treatment  Goal: *Improvement of Existing Pressure Injury  Outcome: Progressing Towards Goal  Goal: *Prevention of pressure injury  Description: Document Cooper Scale and appropriate interventions in the flowsheet.   Outcome: Progressing Towards Goal  Note: Pressure Injury Interventions:  Sensory Interventions: Assess changes in LOC    Moisture Interventions: Apply protective barrier, creams and emollients,Check for incontinence Q2 hours and as needed    Activity Interventions: PT/OT evaluation    Mobility Interventions: HOB 30 degrees or less    Nutrition Interventions: Document food/fluid/supplement intake    Friction and Shear Interventions: HOB 30 degrees or less                Problem: Patient Education: Go to Patient Education Activity  Goal: Patient/Family Education  Outcome: Progressing Towards Goal     Problem: Discharge Planning  Goal: *Discharge to safe environment  Outcome: Progressing Towards Goal  Goal: *Knowledge of medication management  Outcome: Progressing Towards Goal  Goal: *Knowledge of discharge instructions  Outcome: Progressing Towards Goal     Problem: Patient Education: Go to Patient Education Activity  Goal: Patient/Family Education  Outcome: Progressing Towards Goal     Problem: Pressure Injury - Risk of  Goal: *Prevention of pressure injury  Description: Document Cooper Scale and appropriate interventions in the flowsheet.   Outcome: Progressing Towards Goal  Note: Pressure Injury Interventions:  Sensory Interventions: Assess changes in LOC    Moisture Interventions: Apply protective barrier, creams and emollients,Check for incontinence Q2 hours and as needed    Activity Interventions: PT/OT evaluation    Mobility Interventions: HOB 30 degrees or less    Nutrition Interventions: Document food/fluid/supplement intake    Friction and Shear Interventions: HOB 30 degrees or less                Problem: Patient Education: Go to Patient Education Activity  Goal: Patient/Family Education  Outcome: Progressing Towards Goal     Problem: Patient Education: Go to Patient Education Activity  Goal: Patient/Family Education  Outcome: Progressing Towards Goal     Problem: Patient Education: Go to Patient Education Activity  Goal: Patient/Family Education  Outcome: Progressing Towards Goal

## 2022-03-18 NOTE — PROGRESS NOTES
Problem: Self Care Deficits Care Plan (Adult)  Goal: *Acute Goals and Plan of Care (Insert Text)  Description: FUNCTIONAL STATUS PRIOR TO ADMISSION: Patient was independent with ADLs and functional mobility, ambulatory with RW. Endorses 1 recent GLF. Note patient has dementia. HOME SUPPORT: Patient lived alone and reports her son, who lives next door, visited daily. Occupational Therapy Goals  Initiated 3/18/2022  1. Patient will perform self-feeding with supervision/set-up within 7 day(s). 2.  Patient will perform grooming with supervision/set-up within 7 day(s). 3.  Patient will perform upper body dressing with minimal assistance within 7 day(s). 4.  Patient will perform toilet transfers with moderate assistance  within 7 day(s). 5.  Patient will perform all aspects of toileting with moderate assistance  within 7 day(s). 6.  Patient will utilize fall prevention techniques during functional activities with verbal cues within 7 day(s). Outcome: Not Met     OCCUPATIONAL THERAPY EVALUATION  Patient: Tamala Shone (82 y.o. female)  Date: 3/18/2022  Primary Diagnosis: Spinal stenosis in cervical region [M48.02]        Precautions: Fall (C-collar)    ASSESSMENT  Note patient, who has dementia and PTA lived alone independently with her son next door, admitted for [facility] with work-up revealing cervical stenosis. Note neurosurgical plan pending but patient cleared for mobility in the meantime and received with hard cervical collar on. Patient presents for OT evaluation somewhat lethargic, confused, Ox2, with decreased insight into deficits, generally impaired AROM/ strength/ coordination, focal LUE dysmetria (overshooting), and R hip pain (note x-ray negative for fracture). Evaluation limited by hypotension, RN notified (see chart below). Patient with poor awareness of bed positioning and required maxA x2 to scoot to EOB.   Expressed interest in eating breakfast and required cuing to initiate feeding + Yumiko for cutting food and cuing to use appropriate utensils (attempted to eat large bites of Upper sorbian toast with spoon). Patient is significantly below functional baseline. Recommend SNF at d/c. Patient denied any symptoms despite hypotension. Was somewhat lethargic although responded to voice and followed commands. 03/18/22 0953 03/18/22 0958 03/18/22 1001   BP: (!) 89/50 (!) 79/69 (!) 101/55   BP 1 Location: Left upper arm Left upper arm Left upper arm   BP Patient Position: At rest;Supine Comment: HOB elevated 60 degrees At rest  Comment: HOB elevated 45 degrees   Pulse: 60 61 60   SpO2: 99% 98% 98%      Current Level of Function Impacting Discharge (ADLs/self-care): minimum to total assistance    Functional Outcome Measure: The patient scored 10/100 on the Barthel Index outcome measure which is indicative of ~90% impairment in functional performance. Patient will benefit from skilled therapy intervention to address the above noted impairments. PLAN :  Recommendations and Planned Interventions: self care training, functional mobility training, therapeutic exercise, balance training, therapeutic activities, endurance activities, patient education, home safety training, and family training/education    Frequency/Duration: Patient will be followed by occupational therapy 3 times a week to address goals. Recommendation for discharge: (in order for the patient to meet his/her long term goals)  Therapy up to 5 days/week in SNF setting    This discharge recommendation:  Has not yet been discussed the attending provider and/or case management       SUBJECTIVE:   Patient stated I'm okay.     OBJECTIVE DATA SUMMARY:   HISTORY:   Past Medical History:   Diagnosis Date    Arthritis     Dementia ([address]     High cholesterol     Hypertension      Past Surgical History:   Procedure Laterality Date    HX ORTHOPAEDIC      HX PACEMAKER      IN CARDIAC SURG PROCEDURE UNLIST      Aortic Valve Replacement Expanded or extensive additional review of patient history:     Home Situation  Home Environment: Private residence  # Steps to Enter: 2  One/Two Story Residence: One story  Living Alone: Yes  Support Systems: Child(keren)  Patient Expects to be Discharged to[de-identified] Home (TBD)  Current DME Used/Available at Home: Walker, rolling  Tub or Shower Type: Shower (with built-in seat)    Hand dominance: Right    EXAMINATION OF PERFORMANCE DEFICITS:  Cognitive/Behavioral Status:  Neurologic State: Alert;Confused  Orientation Level: Oriented to person;Oriented to place; Disoriented to situation;Disoriented to time  Cognition: Follows commands             Skin: visible skin appears intact    Edema: none noted    Hearing: Auditory  Auditory Impairment: None    Vision/Perceptual:    Tracking: Requires cues, head turns, or add eye shifts to track              Visual Fields:  (intact)                 Range of Motion:    AROM: Generally decreased, functional                         Strength:    Strength: Generally decreased, functional                Coordination:  Coordination: Generally decreased, functional  Fine Motor Skills-Upper: Left Impaired;Right Impaired    Gross Motor Skills-Upper: Left Impaired;Right Impaired    Tone & Sensation:    Tone: Normal  Sensation: Intact                      Balance:  Sitting: Impaired; With support (with support of HOB elevated)    Functional Mobility and Transfers for ADLs:  Bed Mobility:  Rolling: Maximum assistance;Assist x2  Scooting: Maximum assistance;Assist x2 (scooting towards HOB)    Transfers:       ADL Assessment:  Feeding: Minimum assistance (help for cutting food and cues to use correct utensils)    Oral Facial Hygiene/Grooming: Minimum assistance (inferred)    Bathing: Maximum assistance (inferred d/t reach, activity tolerance)    Upper Body Dressing: Maximum assistance (inferred for mobility, tolerance, LUE dysmetria)    Lower Body Dressing:  Total assistance (inferred for reach, activity tolerance )    Toileting: Total assistance (inferred)                  Functional Measure:    Barthel Index:  Bathin  Bladder: 0  Bowels: 5  Groomin  Dressin  Feedin  Mobility: 0  Stairs: 0  Toilet Use: 0  Transfer (Bed to Chair and Back): 0  Total: 10/100      The Barthel ADL Index: Guidelines  1. The index should be used as a record of what a patient does, not as a record of what a patient could do. 2. The main aim is to establish degree of independence from any help, physical or verbal, however minor and for whatever reason. 3. The need for supervision renders the patient not independent. 4. A patient's performance should be established using the best available evidence. Asking the patient, friends/relatives and nurses are the usual sources, but direct observation and common sense are also important. However direct testing is not needed. 5. Usually the patient's performance over the preceding 24-48 hours is important, but occasionally longer periods will be relevant. 6. Middle categories imply that the patient supplies over 50 per cent of the effort. 7. Use of aids to be independent is allowed. Score Interpretation (from [address] [address])    Independent   60-79 Minimally independent   40-59 Partially dependent   20-39 Very dependent   <20 Totally dependent     -Mona Haro., Barthel, D.W. (1965). Functional evaluation: the Barthel Index. [address] [facility] ([address] (1997). The Barthel activities of daily living index: self-reporting versus actual performance in the old (> or = 75 years). Journal of [address] [place] [address] 45(7), [facility], J.J.M., Melissa Golds., [facility]. (). Measuring the change in disability after inpatient rehabilitation; comparison of the responsiveness of the Barthel Index and Functional Fleming Measure. Journal of Neurology, Neurosurgery, and Psychiatry, 66(4), 426-507.   -MICHAEL Smith, Aaron Victoria M.A. (2004) Assessment of post-stroke quality of life in cost-effectiveness studies: The usefulness of the Barthel Index and the EuroQoL-5D. Quality of Life Research, 15, 267-71         Occupational Therapy Evaluation Charge Determination   History Examination Decision-Making   LOW Complexity : Brief history review  MEDIUM Complexity : 3-5 performance deficits relating to physical, cognitive , or psychosocial skils that result in activity limitations and / or participation restrictions MEDIUM Complexity : Patient may present with comorbidities that affect occupational performnce. Miniml to moderate modification of tasks or assistance (eg, physical or verbal ) with assesment(s) is necessary to enable patient to complete evaluation       Based on the above components, the patient evaluation is determined to be of the following complexity level: LOW   Pain Rating:  Patient reported R hip pain with limited in-bed mobility    Activity Tolerance:   Poor    After treatment patient left in no apparent distress:    Supine in bed, Call bell within reach, and Bed / chair alarm activated    COMMUNICATION/EDUCATION:   The patients plan of care was discussed with: Physical therapist and Registered nurse. Home safety education was provided and the patient/caregiver indicated understanding., Patient/family have participated as able in goal setting and plan of care. , and Patient/family agree to work toward stated goals and plan of care. This patients plan of care is appropriate for delegation to hospitals.     Thank you for this referral.  Chelly Bobo OT  Time Calculation: 21 mins

## 2022-03-18 NOTE — PROGRESS NOTES
ICU consult noted. Spoke with Dr. Stevo Vela regarding low blood pressures. We will evaluate patient, full note to follow.     Aung Greenfield, DO   Staff Intensivist/Anesthesiologist  Critical Care Medicine

## 2022-03-19 LAB
APPEARANCE UR: CLEAR
BACTERIA URNS QL MICRO: NEGATIVE /HPF
BILIRUB UR QL: NEGATIVE
COLOR UR: ABNORMAL
EPITH CASTS URNS QL MICRO: ABNORMAL /LPF
GLUCOSE UR STRIP.AUTO-MCNC: NEGATIVE MG/DL
HGB UR QL STRIP: NEGATIVE
INR PPP: 1.1 (ref 0.9–1.1)
KETONES UR QL STRIP.AUTO: NEGATIVE MG/DL
LEUKOCYTE ESTERASE UR QL STRIP.AUTO: ABNORMAL
NITRITE UR QL STRIP.AUTO: NEGATIVE
PH UR STRIP: 5.5 [PH] (ref 5–8)
PROT UR STRIP-MCNC: NEGATIVE MG/DL
PROTHROMBIN TIME: 11.3 SEC (ref 9–11.1)
RBC #/AREA URNS HPF: ABNORMAL /HPF (ref 0–5)
SP GR UR REFRACTOMETRY: 1.02 (ref 1–1.03)
UA: UC IF INDICATED,UAUC: ABNORMAL
UROBILINOGEN UR QL STRIP.AUTO: 1 EU/DL (ref 0.2–1)
WBC URNS QL MICRO: ABNORMAL /HPF (ref 0–4)

## 2022-03-19 PROCEDURE — 74011250636 HC RX REV CODE- 250/636: Performed by: NURSE PRACTITIONER

## 2022-03-19 PROCEDURE — 36415 COLL VENOUS BLD VENIPUNCTURE: CPT

## 2022-03-19 PROCEDURE — 94760 N-INVAS EAR/PLS OXIMETRY 1: CPT

## 2022-03-19 PROCEDURE — 65270000029 HC RM PRIVATE

## 2022-03-19 PROCEDURE — 77010033678 HC OXYGEN DAILY

## 2022-03-19 PROCEDURE — 87186 SC STD MICRODIL/AGAR DIL: CPT

## 2022-03-19 PROCEDURE — 87077 CULTURE AEROBIC IDENTIFY: CPT

## 2022-03-19 PROCEDURE — 74011250637 HC RX REV CODE- 250/637: Performed by: INTERNAL MEDICINE

## 2022-03-19 PROCEDURE — 51798 US URINE CAPACITY MEASURE: CPT

## 2022-03-19 PROCEDURE — 85610 PROTHROMBIN TIME: CPT

## 2022-03-19 PROCEDURE — 87086 URINE CULTURE/COLONY COUNT: CPT

## 2022-03-19 PROCEDURE — 74011000250 HC RX REV CODE- 250: Performed by: NURSE PRACTITIONER

## 2022-03-19 PROCEDURE — 74011250637 HC RX REV CODE- 250/637: Performed by: STUDENT IN AN ORGANIZED HEALTH CARE EDUCATION/TRAINING PROGRAM

## 2022-03-19 PROCEDURE — 81001 URINALYSIS AUTO W/SCOPE: CPT

## 2022-03-19 RX ADMIN — MIDODRINE HYDROCHLORIDE 5 MG: 5 TABLET ORAL at 18:31

## 2022-03-19 RX ADMIN — HYDROCODONE BITARTRATE AND ACETAMINOPHEN 1 TABLET: 5; 325 TABLET ORAL at 03:13

## 2022-03-19 RX ADMIN — WARFARIN SODIUM 2.5 MG: 2 TABLET ORAL at 18:32

## 2022-03-19 RX ADMIN — HYDROCODONE BITARTRATE AND ACETAMINOPHEN 1 TABLET: 5; 325 TABLET ORAL at 10:43

## 2022-03-19 RX ADMIN — CEFTRIAXONE SODIUM 1 G: 1 INJECTION, POWDER, FOR SOLUTION INTRAMUSCULAR; INTRAVENOUS at 03:13

## 2022-03-19 RX ADMIN — OXYBUTYNIN CHLORIDE 5 MG: 5 TABLET ORAL at 10:42

## 2022-03-19 RX ADMIN — MIDODRINE HYDROCHLORIDE 5 MG: 5 TABLET ORAL at 10:43

## 2022-03-19 RX ADMIN — RISPERIDONE 1 MG: 1 TABLET, FILM COATED ORAL at 18:32

## 2022-03-19 RX ADMIN — OXYBUTYNIN CHLORIDE 5 MG: 5 TABLET ORAL at 18:32

## 2022-03-19 RX ADMIN — MEMANTINE HYDROCHLORIDE 5 MG: 5 TABLET, FILM COATED ORAL at 10:43

## 2022-03-19 RX ADMIN — MEMANTINE HYDROCHLORIDE 5 MG: 5 TABLET, FILM COATED ORAL at 18:32

## 2022-03-19 RX ADMIN — SERTRALINE 25 MG: 50 TABLET, FILM COATED ORAL at 10:43

## 2022-03-19 RX ADMIN — RISPERIDONE 1 MG: 1 TABLET, FILM COATED ORAL at 10:43

## 2022-03-19 NOTE — PROGRESS NOTES
Transition of Care Plan   RUR- Low  13%   DISPOSITION: SNF - Pending choice   F/U with PCP/Specialist     Transport: [facility]      Emergency contact: Vera Ramírez [phone]    BRODIE barriers to discharge:     PT/OT recommending SNF at discharge. CM met with patient and son, Iker Ledbetter, at the bedside to discuss discharge plan. CM provided choice list, patient and son plan to provide 3 choices. Plan to follow up tomorrow for choice and send referrals. Patient is Medicare A&B insured, no insurance auth needed. Spencer Meckel) Lindajo Screen, M.S.W.

## 2022-03-19 NOTE — PROGRESS NOTES
Pharmacist Note - Warfarin Dosing  Consult provided for this 82 y. o.female to manage warfarin for Tissue Heart Valve    INR Goal: 2 - 3    Drugs that may increase INR: None  Drugs that may decrease INR: None  Other current anticoagulants/ drugs that may increase bleeding risk: None  Risk factors: Age > 65  Daily INR ordered: YES    Recent Labs     03/19/22  0049 03/18/22  1144 03/17/22  0623 03/16/22  1845   HGB  --  9.3* 10.2* 10.3*   INR 1.1 1.1  --   --      Date               INR                  Dose  3/18   1.1   2.5 mg   3/19   1.1   2.5 mg                                                                                Assessment/ Plan: Will order warfarin 2.5 mg PO x 1 dose. Pharmacy will continue to monitor daily and adjust therapy as indicated. Please contact the pharmacist at x [phone] or  for outpatient recommendations if needed.

## 2022-03-19 NOTE — ROUTINE PROCESS
Bedside and Verbal shift change report given to rolando Seymour (oncoming nurse) by Wan Portillo (offgoing nurse). Report included the following information SBAR, Kardex, Intake/Output, Recent Results and Cardiac Rhythm SR/a-paced.

## 2022-03-19 NOTE — PROGRESS NOTES
[facility]                                                                                          Hospitalist Progress Note  Melody Cartagena MD  Answering service: [phone] OR [phone] from in house phone        Date of Service:  3/19/2022  NAME:  Josey Vega  :  1935  MRN:  [id number]      Admission Summary:   Copied form admit: \"    82 y.o. female presents with several days duration of abrupt onset, constant, gradually worsening, severe, nonradiating, sharp, bilateral hip pain is associated bilateral black eyes and happened after a fall. Patient denies exacerbating features  and denies remitting features.     Patient fell last week onto her nose, she is doing just fine, but woke up this morning she is not feeling well, could not walk. Incidental findings on imaging showed cervical stenosis, for which the emergency department consulted neurosurgery, who suggested that the patient be admitted and MRI be completed.  \"    Interval history / Subjective:     3/19/2022 :    Cont in cervical collar   MRI pending   Hypotension improved   UTI noted abx started cult pending   Pt in no acute distress   Else:        Assessment & Plan:     Spinal stenosis in cervical region (3/16/2022)  3/17:   Stabilized w cervical collar til needs proven otherwise via MRI cervical neck   3/18:   Cont in cervical collar   MRI pending   Pt hypotensive through night and am 3/18/2022  - not seemingly vol responsive    ICU intensivist to eval   Septic w/u started      UTI, rocephin, cult ( 3/19)     Hypotension resolved as an acute issue           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 I had a face to face encounter with patient on 3/19/2022 at bedside for the following physical exam:     PHYSICAL EXAM:    Visit Vitals  /66   Pulse 61   Temp 98 °F (36.7 °C)   Resp 17   Ht 5' (1.524 m)   Wt 70.3 kg (154 lb 15.7 oz)   SpO2 97%   Breastfeeding No   BMI 30.27 kg/m²          Physical Exam  Constitutional:       General: She is not in acute distress. Appearance: She is not ill-appearing, toxic-appearing or diaphoretic. HENT:      Head: Normocephalic and atraumatic. Right Ear: External ear normal.      Left Ear: External ear normal.      Nose: Nose normal.      Mouth/Throat:      Mouth: Mucous membranes are dry. Pharynx: Oropharynx is clear. Eyes:      General:         Right eye: No discharge. Left eye: No discharge. Extraocular Movements: Extraocular movements intact. Conjunctiva/sclera: Conjunctivae normal.      Pupils: Pupils are equal, round, and reactive to light. Cardiovascular:      Rate and Rhythm: Normal rate and regular rhythm. Pulmonary:      Effort: Pulmonary effort is normal. No respiratory distress. Abdominal:      General: Abdomen is flat. There is no distension. Palpations: Abdomen is soft. Musculoskeletal:         General: No swelling or deformity. Cervical back: Normal range of motion and neck supple. Skin:     General: Skin is warm. Coloration: Skin is not jaundiced or pale. Neurological:      General: No focal deficit present. Mental Status: Mental status is at baseline.    Psychiatric:         Mood and Affect: Mood normal.         Behavior: Behavior normal.      Comments:                       Data Review:    Review and/or order of clinical lab test      Labs:     Recent Labs     03/18/22  1144 03/17/22  0623   WBC 8.0 10.2   HGB 9.3* 10.2*   HCT 30.0* 32.7*   * 120*     Recent Labs 03/17/22  0623 03/16/22  1845    140   K 4.1 3.7   * 105   CO2 26 25   BUN 31* 27*   CREA 0.60 0.78   GLU 90 131*   CA 8.7 8.8     Recent Labs     03/17/22  0623 03/16/22  1845   ALT 22 23   AP 80 81   TBILI 1.2* 1.5*   TP 6.0* 6.9   ALB 3.1* 3.5   GLOB 2.9 3.4     Recent Labs     03/19/22  0049 03/18/22  1144   INR 1.1 1.1   PTP 11.3* 11.4*      No results for input(s): FE, TIBC, PSAT, FERR in the last 72 hours. Lab Results   Component Value Date/Time    Folate 7.9 03/17/2022 06:23 AM      No results for input(s): PH, PCO2, PO2 in the last 72 hours.   Recent Labs     03/17/22 0623   CPK 76     Lab Results   Component Value Date/Time    Cholesterol, total 230 (H) 03/17/2022 06:23 AM    HDL Cholesterol 56 03/17/2022 06:23 AM    LDL, calculated 153.8 (H) 03/17/2022 06:23 AM    Triglyceride 101 03/17/2022 06:23 AM    CHOL/HDL Ratio 4.1 03/17/2022 06:23 AM     No results found for: GLUCPOC  Lab Results   Component Value Date/Time    Color YELLOW/STRAW 03/19/2022 12:54 AM    Appearance CLEAR 03/19/2022 12:54 AM    Specific gravity 1.016 03/19/2022 12:54 AM    pH (UA) 5.5 03/19/2022 12:54 AM    Protein Negative 03/19/2022 12:54 AM    Glucose Negative 03/19/2022 12:54 AM    Ketone Negative 03/19/2022 12:54 AM    Bilirubin Negative 03/19/2022 12:54 AM    Urobilinogen 1.0 03/19/2022 12:54 AM    Nitrites Negative 03/19/2022 12:54 AM    Leukocyte Esterase LARGE (A) 03/19/2022 12:54 AM    Epithelial cells FEW 03/19/2022 12:54 AM    Bacteria Negative 03/19/2022 12:54 AM    WBC 20-50 03/19/2022 12:54 AM    RBC 0-5 03/19/2022 12:54 AM         Medications Reviewed:     Current Facility-Administered Medications   Medication Dose Route Frequency    cefTRIAXone (ROCEPHIN) 1 g in 0.9% sodium chloride 10 mL IV syringe  1 g IntraVENous Q24H    warfarin (COUMADIN) tablet 2.5 mg  2.5 mg Oral ONCE    midodrine (PROAMATINE) tablet 5 mg  5 mg Oral BID WITH MEALS    warfarin - pharmacy to dose    Other Rx Dosing/Monitoring  acetaminophen (TYLENOL) tablet 650 mg  650 mg Oral Q4H PRN    [Held by provider] lisinopriL (PRINIVIL, ZESTRIL) tablet 10 mg  10 mg Oral DAILY    risperiDONE (RisperDAL) tablet 1 mg  1 mg Oral BID    memantine (NAMENDA) tablet 5 mg  5 mg Oral BID    sertraline (ZOLOFT) tablet 25 mg  25 mg Oral DAILY    oxybutynin (DITROPAN) tablet 5 mg  5 mg Oral BID    HYDROcodone-acetaminophen (NORCO) 5-325 mg per tablet 1 Tablet  1 Tablet Oral Q6H PRN     ______________________________________________________________________  EXPECTED LENGTH OF STAY: 2d 21h  ACTUAL LENGTH OF STAY:          3                 Horacio Moctezuma MD

## 2022-03-19 NOTE — ROUTINE PROCESS
Sent message to arsenio oshea np. Mrs Shea Foley is here for spinal stenosis. Inserted a rivero due to retention. Urine shows a large amt of leukocytes. Would you like to start pt on antibiotics? Pt on no antibiotics currently.  thank you Yvonne Gonzalez

## 2022-03-20 LAB
INR PPP: 1.1 (ref 0.9–1.1)
PROTHROMBIN TIME: 11.8 SEC (ref 9–11.1)

## 2022-03-20 PROCEDURE — 36415 COLL VENOUS BLD VENIPUNCTURE: CPT

## 2022-03-20 PROCEDURE — 65270000029 HC RM PRIVATE

## 2022-03-20 PROCEDURE — 74011250637 HC RX REV CODE- 250/637: Performed by: STUDENT IN AN ORGANIZED HEALTH CARE EDUCATION/TRAINING PROGRAM

## 2022-03-20 PROCEDURE — 94760 N-INVAS EAR/PLS OXIMETRY 1: CPT

## 2022-03-20 PROCEDURE — 74011000250 HC RX REV CODE- 250: Performed by: NURSE PRACTITIONER

## 2022-03-20 PROCEDURE — 77010033678 HC OXYGEN DAILY

## 2022-03-20 PROCEDURE — 85610 PROTHROMBIN TIME: CPT

## 2022-03-20 PROCEDURE — 74011250637 HC RX REV CODE- 250/637: Performed by: INTERNAL MEDICINE

## 2022-03-20 PROCEDURE — 74011250636 HC RX REV CODE- 250/636: Performed by: NURSE PRACTITIONER

## 2022-03-20 RX ADMIN — SERTRALINE 25 MG: 50 TABLET, FILM COATED ORAL at 09:09

## 2022-03-20 RX ADMIN — MIDODRINE HYDROCHLORIDE 5 MG: 5 TABLET ORAL at 18:14

## 2022-03-20 RX ADMIN — MIDODRINE HYDROCHLORIDE 5 MG: 5 TABLET ORAL at 09:07

## 2022-03-20 RX ADMIN — HYDROCODONE BITARTRATE AND ACETAMINOPHEN 1 TABLET: 5; 325 TABLET ORAL at 05:31

## 2022-03-20 RX ADMIN — OXYBUTYNIN CHLORIDE 5 MG: 5 TABLET ORAL at 09:09

## 2022-03-20 RX ADMIN — OXYBUTYNIN CHLORIDE 5 MG: 5 TABLET ORAL at 18:13

## 2022-03-20 RX ADMIN — RISPERIDONE 1 MG: 1 TABLET, FILM COATED ORAL at 18:14

## 2022-03-20 RX ADMIN — MEMANTINE HYDROCHLORIDE 5 MG: 5 TABLET, FILM COATED ORAL at 18:13

## 2022-03-20 RX ADMIN — CEFTRIAXONE SODIUM 1 G: 1 INJECTION, POWDER, FOR SOLUTION INTRAMUSCULAR; INTRAVENOUS at 03:45

## 2022-03-20 RX ADMIN — MEMANTINE HYDROCHLORIDE 5 MG: 5 TABLET, FILM COATED ORAL at 09:09

## 2022-03-20 RX ADMIN — RISPERIDONE 1 MG: 1 TABLET, FILM COATED ORAL at 09:09

## 2022-03-20 NOTE — PROGRESS NOTES
Problem: Falls - Risk of  Goal: *Absence of Falls  Description: Document Derek Levi Fall Risk and appropriate interventions in the flowsheet.   Outcome: Progressing Towards Goal  Note: Fall Risk Interventions:  Mobility Interventions: Bed/chair exit alarm,Patient to call before getting OOB,Strengthening exercises (ROM-active/passive),Utilize walker, cane, or other assistive device    Mentation Interventions: Adequate sleep, hydration, pain control,Bed/chair exit alarm,Evaluate medications/consider consulting pharmacy,Increase mobility,More frequent rounding,Reorient patient,Room close to nurse's station,Toileting rounds,Update white board    Medication Interventions: Bed/chair exit alarm,Evaluate medications/consider consulting pharmacy,Patient to call before getting OOB,Teach patient to arise slowly    Elimination Interventions: Bed/chair exit alarm,Call light in reach,Patient to call for help with toileting needs,Toileting schedule/hourly rounds    History of Falls Interventions: Bed/chair exit alarm,Door open when patient unattended,Investigate reason for fall,Room close to nurse's station

## 2022-03-20 NOTE — PROGRESS NOTES
[address] [facility] Adult  Hospitalist Group                                                                                          Hospitalist Progress Note  Jose Perea MD  Answering service: [phone] OR [phone] from in house phone        Date of Service:  3/20/2022  NAME:  Genene Cushing  :  1935  MRN:  [id number]      Admission Summary:   Copied form admit: \"    82 y.o. female presents with several days duration of abrupt onset, constant, gradually worsening, severe, nonradiating, sharp, bilateral hip pain is associated bilateral black eyes and happened after a fall. Patient denies exacerbating features  and denies remitting features.     Patient fell last week onto her nose, she is doing just fine, but woke up this morning she is not feeling well, could not walk. Incidental findings on imaging showed cervical stenosis, for which the emergency department consulted neurosurgery, who suggested that the patient be admitted and MRI be completed.  \"    Interval history / Subjective:     3/20/2022 :    Much more alert   fosr MRI as can in setting of PM to be determined on Monday   Osvaldo John no distress        Assessment & Plan:     Spinal stenosis in cervical region (3/16/2022)  3/17:   Stabilized w cervical collar til needs proven otherwise via MRI cervical neck   3/18:   Cont in cervical collar   MRI pending   Pt hypotensive through night and am 3/18/2022  - not seemingly vol responsive    ICU intensivist to eval   Septic w/u started    Aortic lyla replacement status, off OAC x past 2 years on ASA per last notes and pt confirms same \"Coumadin was replaced with low dose ASA\"     UTI, rocephin, cult ( 3/19)     Hypotension resolved as an acute issue           Lyla heart dz w prosthetic valve; off [facility] x past 2 years on ASA per last notes and pt confirms same \"Coumadin was replaced with low dose ASA\"     Hyperlipidemia    Dementia        Hospital Problems  Date Reviewed: 3/17/2022          Codes Class Noted POA    Spinal stenosis in cervical region ICD-10-CM: M48.02  ICD-9-CM: 723.0  3/16/2022 Unknown                  After personally interviewing pt at bedside the following is noted on 3/20/2022 :    Review of Systems   Constitutional: Negative for chills, fever, malaise/fatigue and weight loss. Respiratory: Negative. Cardiovascular: Positive for chest pain. All other systems reviewed and are negative. I had a face to face encounter with patient on 3/20/2022 at bedside for the following physical exam:     PHYSICAL EXAM:    Visit Vitals  /62   Pulse 60   Temp 98.1 °F (36.7 °C)   Resp 15   Ht 5' (1.524 m)   Wt 70.3 kg (154 lb 15.7 oz)   SpO2 95%   Breastfeeding No   BMI 30.27 kg/m²          Physical Exam  Constitutional:       General: She is not in acute distress. Appearance: She is not ill-appearing, toxic-appearing or diaphoretic. HENT:      Head: Normocephalic and atraumatic. Right Ear: External ear normal.      Left Ear: External ear normal.      Nose: Nose normal.      Mouth/Throat:      Mouth: Mucous membranes are dry. Pharynx: Oropharynx is clear. Eyes:      General:         Right eye: No discharge. Left eye: No discharge. Extraocular Movements: Extraocular movements intact. Conjunctiva/sclera: Conjunctivae normal.      Pupils: Pupils are equal, round, and reactive to light. Cardiovascular:      Rate and Rhythm: Normal rate and regular rhythm. Pulmonary:      Effort: Pulmonary effort is normal. No respiratory distress. Abdominal:      General: Abdomen is flat. There is no distension. Palpations: Abdomen is soft. Musculoskeletal:         General: No swelling or deformity. Cervical back: Normal range of motion and neck supple. Skin:     General: Skin is warm. Coloration: Skin is not jaundiced or pale. Neurological:      General: No focal deficit present. Mental Status: Mental status is at baseline. Psychiatric:         Mood and Affect: Mood normal.         Behavior: Behavior normal.      Comments:                       Data Review:    Review and/or order of clinical lab test      Labs:     Recent Labs     03/18/22  1144   WBC 8.0   HGB 9.3*   HCT 30.0*   *     No results for input(s): NA, K, CL, CO2, BUN, CREA, GLU, CA, MG, PHOS, URICA in the last 72 hours. No results for input(s): ALT, AP, TBIL, TBILI, TP, ALB, GLOB, GGT, AML, LPSE in the last 72 hours. No lab exists for component: SGOT, GPT, AMYP, HLPSE  Recent Labs     03/20/22  0515 03/19/22  0049 03/18/22  1144   INR 1.1 1.1 1.1   PTP 11.8* 11.3* 11.4*      No results for input(s): FE, TIBC, PSAT, FERR in the last 72 hours. Lab Results   Component Value Date/Time    Folate 7.9 03/17/2022 06:23 AM      No results for input(s): PH, PCO2, PO2 in the last 72 hours. No results for input(s): CPK, CKNDX, TROIQ in the last 72 hours.     No lab exists for component: CPKMB  Lab Results   Component Value Date/Time    Cholesterol, total 230 (H) 03/17/2022 06:23 AM    HDL Cholesterol 56 03/17/2022 06:23 AM    LDL, calculated 153.8 (H) 03/17/2022 06:23 AM    Triglyceride 101 03/17/2022 06:23 AM    CHOL/HDL Ratio 4.1 03/17/2022 06:23 AM     No results found for: [facility]  Lab Results   Component Value Date/Time    Color YELLOW/STRAW 03/19/2022 12:54 AM    Appearance CLEAR 03/19/2022 12:54 AM    Specific gravity 1.016 03/19/2022 12:54 AM    pH (UA) 5.5 03/19/2022 12:54 AM    Protein Negative 03/19/2022 12:54 AM    Glucose Negative 03/19/2022 12:54 AM    Ketone Negative 03/19/2022 12:54 AM    Bilirubin Negative 03/19/2022 12:54 AM    Urobilinogen 1.0 03/19/2022 12:54 AM    Nitrites Negative 03/19/2022 12:54 AM    Leukocyte Esterase LARGE (A) 03/19/2022 12:54 AM    Epithelial cells FEW 03/19/2022 12:54 AM    Bacteria Negative 03/19/2022 12:54 AM    WBC 20-50 03/19/2022 12:54 AM    RBC 0-5 03/19/2022 12:54 AM         Medications Reviewed:     Current Facility-Administered Medications   Medication Dose Route Frequency    cefTRIAXone (ROCEPHIN) 1 g in 0.9% sodium chloride 10 mL IV syringe  1 g IntraVENous Q24H    midodrine (PROAMATINE) tablet 5 mg  5 mg Oral BID WITH MEALS    acetaminophen (TYLENOL) tablet 650 mg  650 mg Oral Q4H PRN    [Held by provider] lisinopriL (PRINIVIL, ZESTRIL) tablet 10 mg  10 mg Oral DAILY    risperiDONE (RisperDAL) tablet 1 mg  1 mg Oral BID    memantine (NAMENDA) tablet 5 mg  5 mg Oral BID    sertraline (ZOLOFT) tablet 25 mg  25 mg Oral DAILY    oxybutynin (DITROPAN) tablet 5 mg  5 mg Oral BID    HYDROcodone-acetaminophen (NORCO) 5-325 mg per tablet 1 Tablet  1 Tablet Oral Q6H PRN     ______________________________________________________________________  EXPECTED LENGTH OF STAY: 2d 21h  ACTUAL LENGTH OF STAY:          4                 Angel Dsouza MD

## 2022-03-21 LAB
BACTERIA SPEC CULT: ABNORMAL
BACTERIA SPEC CULT: ABNORMAL
CC UR VC: ABNORMAL
SERVICE CMNT-IMP: ABNORMAL

## 2022-03-21 PROCEDURE — 97535 SELF CARE MNGMENT TRAINING: CPT

## 2022-03-21 PROCEDURE — 97530 THERAPEUTIC ACTIVITIES: CPT

## 2022-03-21 PROCEDURE — 77010033678 HC OXYGEN DAILY

## 2022-03-21 PROCEDURE — 74011250637 HC RX REV CODE- 250/637: Performed by: INTERNAL MEDICINE

## 2022-03-21 PROCEDURE — 99223 1ST HOSP IP/OBS HIGH 75: CPT | Performed by: INTERNAL MEDICINE

## 2022-03-21 PROCEDURE — 74011000250 HC RX REV CODE- 250: Performed by: NURSE PRACTITIONER

## 2022-03-21 PROCEDURE — 65270000029 HC RM PRIVATE

## 2022-03-21 PROCEDURE — 74011250637 HC RX REV CODE- 250/637: Performed by: STUDENT IN AN ORGANIZED HEALTH CARE EDUCATION/TRAINING PROGRAM

## 2022-03-21 PROCEDURE — 74011250636 HC RX REV CODE- 250/636: Performed by: NURSE PRACTITIONER

## 2022-03-21 RX ADMIN — OXYBUTYNIN CHLORIDE 5 MG: 5 TABLET ORAL at 17:43

## 2022-03-21 RX ADMIN — CEFTRIAXONE SODIUM 1 G: 1 INJECTION, POWDER, FOR SOLUTION INTRAMUSCULAR; INTRAVENOUS at 04:15

## 2022-03-21 RX ADMIN — MEMANTINE HYDROCHLORIDE 5 MG: 5 TABLET, FILM COATED ORAL at 17:45

## 2022-03-21 RX ADMIN — RISPERIDONE 1 MG: 1 TABLET, FILM COATED ORAL at 09:29

## 2022-03-21 RX ADMIN — MEMANTINE HYDROCHLORIDE 5 MG: 5 TABLET, FILM COATED ORAL at 09:30

## 2022-03-21 RX ADMIN — OXYBUTYNIN CHLORIDE 5 MG: 5 TABLET ORAL at 09:29

## 2022-03-21 RX ADMIN — MIDODRINE HYDROCHLORIDE 5 MG: 5 TABLET ORAL at 09:29

## 2022-03-21 RX ADMIN — SERTRALINE 25 MG: 50 TABLET, FILM COATED ORAL at 09:28

## 2022-03-21 RX ADMIN — MIDODRINE HYDROCHLORIDE 5 MG: 5 TABLET ORAL at 16:43

## 2022-03-21 RX ADMIN — RISPERIDONE 1 MG: 1 TABLET, FILM COATED ORAL at 17:43

## 2022-03-21 NOTE — PROGRESS NOTES
Transition of Care Plan: SNF   Referrals: [facility]    RUR: 13%     PCP F/U: Dr. Gibbons Likes     Disposition: SNF     Transportation: [facility]    Main Contact: Lety Carroll [phone]: Spoke with son Dustin Quintero via telephone call. States he would like referral sent to [facility] in [place]. When asked for a few more choices, son states he will let this CM know in the morning.  Referral sent to the [facility] via Vinicius Segovia RN, CRM

## 2022-03-21 NOTE — PROGRESS NOTES
Problem: Mobility Impaired (Adult and Pediatric)  Goal: *Acute Goals and Plan of Care (Insert Text)  Description: FUNCTIONAL STATUS PRIOR TO ADMISSION: Patient was independent and active without use of DME. Per chart review, pt with recent fall ~ 2 weeks ago which resulted in this admission. Pt with baseline dementia and questionable historian to provide prior level of function    HOME SUPPORT PRIOR TO ADMISSION: The patient lived alone with family that lived next door that checked on her daily    Physical Therapy Goals  Initiated 3/18/2022  1. Patient will move from supine to sit and sit to supine  and roll side to side in bed with moderate assistance  within 7 day(s). 2.  Patient will transfer from bed to chair and chair to bed with maximal assistance using the least restrictive device within 7 day(s). 3.  Patient will perform sit to stand with maximal assistance within 7 day(s). 4.  Patient will ambulate with maximal assistance for 5 feet with the least restrictive device within 7 day(s). Outcome: Progressing Towards Goal   PHYSICAL THERAPY TREATMENT  Patient: Josey Vega (82 y.o. female)  Date: 3/21/2022  Diagnosis: Spinal stenosis in cervical region [M48.02] <principal problem not specified>       Precautions: Fall (C-collar)  Chart, physical therapy assessment, plan of care and goals were reviewed. ASSESSMENT  Patient continues with skilled PT services and is progressing towards goals. Pt presents with impaired balance, unsteady gait, generalized weakness and decreased activity tolerance. Pt BP remained stable throughout session. Received on 2L of O2 which was removed and pt remained stable on RA during mobility. Required minAx2 for supine to sit and sit to stand with RW. Pt demonstrated strong posterior lean in standing and sitting. Pt provided with VC to lean forward, but demonstrated little carryover.  Pt educated on c-spine precautions, and demonstrated little carry over to adhere to them during mobility. Recommending SNF upon discharge. Current Level of Function Impacting Discharge (mobility/balance): [facility] x2 bed >chair with RW    Other factors to consider for discharge: fall risk, lives alone, independent baseline, hx falls          PLAN :  Patient continues to benefit from skilled intervention to address the above impairments. Continue treatment per established plan of care. to address goals. Recommendation for discharge: (in order for the patient to meet his/her long term goals)  Therapy up to 5 days/week in SNF setting    This discharge recommendation:  Has not yet been discussed the attending provider and/or case management    IF patient discharges home will need the following DME: to be determined (TBD)       SUBJECTIVE:   Patient stated i'm not dancing.     OBJECTIVE DATA SUMMARY:   Critical Behavior:  Neurologic State: Alert  Orientation Level: Oriented to person,Oriented to place,Oriented to situation  Cognition: Follows commands,Decreased attention/concentration,Appropriate safety awareness  Safety/Judgement: Awareness of environment  Functional Mobility Training:  Bed Mobility:     Supine to Sit: Minimum assistance;Assist x2              Transfers:  Sit to Stand: Minimum assistance;Assist x2           Bed to Chair: Minimum assistance; Moderate assistance;Assist x2                    Balance:  Sitting: Impaired  Sitting - Static: Fair (occasional)  Sitting - Dynamic: Poor (constant support)  Standing: Impaired; With support  Standing - Static: Fair;Constant support  Standing - Dynamic : Fair;Constant support      Activity Tolerance:   Fair    After treatment patient left in no apparent distress:   Sitting in chair, Call bell within reach, and Bed / chair alarm activated    COMMUNICATION/COLLABORATION:   The patients plan of care was discussed with: Occupational therapist and Registered nurse.      Alonso Jacobs SPT   Time Calculation: 23 mins        Regarding student involvement in patient care:  A student participated in this treatment session. Per CMS Medicare statements and APTA guidelines I certify that the following was true:  1. I was present and directly observed the entire session. 2. I made all skilled judgments and clinical decisions regarding care. 3. I am the practitioner responsible for assessment, treatment, and documentation.

## 2022-03-21 NOTE — PROGRESS NOTES
[address] [facility] Adult  Hospitalist Group                                                                                          Hospitalist Progress Note  Suleman Lemon MD  Answering service: [phone] OR [phone] from in house phone        Date of Service:  3/21/2022  NAME:  Rodrick Mary  :  1935  MRN:  [id number]      Admission Summary:   Copied form admit: \"    82 y.o. female presents with several days duration of abrupt onset, constant, gradually worsening, severe, nonradiating, sharp, bilateral hip pain is associated bilateral black eyes and happened after a fall. Patient denies exacerbating features  and denies remitting features.     Patient fell last week onto her nose, she is doing just fine, but woke up this morning she is not feeling well, could not walk. Incidental findings on imaging showed cervical stenosis, for which the emergency department consulted neurosurgery, who suggested that the patient be admitted and MRI be completed.  \"    Interval history / Subjective:     3/21/2022 :    No distress   Await MRI   Cont on cervcial colar, minor    Cardiology to clear for MRI        Assessment & Plan:     Spinal stenosis in cervical region (3/16/2022)  3/17:   Stabilized w cervical collar til needs proven otherwise via MRI cervical neck   3/18:   Cont in cervical collar   MRI pending   Pt hypotensive through night and am 3/18/2022  - not seemingly vol responsive    ICU intensivist to eval   Septic w/u started  3/21: above resovled, treated w abx for uti ; else    Await MRI   Cont on cervcial colar, minor    Cardiology to clear for MRI     Aortic lyla replacement status, off OAC x past 2 years on ASA per last notes and pt confirms same \"Coumadin was replaced with low dose ASA\"     UTI, rocephin, cult ( 3/19)     Hypotension resolved as an acute issue           Lyla heart dz w prosthetic valve; off [address] x past 2 years on ASA per last notes and pt confirms same \"Coumadin was replaced with low dose ASA\"     Hyperlipidemia    Dementia        Hospital Problems  Date Reviewed: 3/17/2022          Codes Class Noted POA    Spinal stenosis in cervical region ICD-10-CM: M48.02  ICD-9-CM: 723.0  3/16/2022 Unknown                  After personally interviewing pt at bedside the following is noted on 3/21/2022 :    Review of Systems   Constitutional: Negative for chills, fever, malaise/fatigue and weight loss. Respiratory: Negative. Cardiovascular: Negative for chest pain. All other systems reviewed and are negative. I had a face to face encounter with patient on 3/21/2022 at bedside for the following physical exam:     PHYSICAL EXAM:    Visit Vitals  BP (!) 123/92 (BP 1 Location: Left upper arm, BP Patient Position: Sitting)   Pulse 72   Temp 98.5 °F (36.9 °C)   Resp 17   Ht 5' (1.524 m)   Wt 62.4 kg (137 lb 9.1 oz)   SpO2 94%   Breastfeeding No   BMI 26.87 kg/m²          Physical Exam  Constitutional:       General: She is not in acute distress. Appearance: She is not ill-appearing, toxic-appearing or diaphoretic. HENT:      Head: Normocephalic and atraumatic. Right Ear: External ear normal.      Left Ear: External ear normal.      Nose: Nose normal.      Mouth/Throat:      Mouth: Mucous membranes are dry. Pharynx: Oropharynx is clear. Eyes:      General:         Right eye: No discharge. Left eye: No discharge. Extraocular Movements: Extraocular movements intact. Conjunctiva/sclera: Conjunctivae normal.      Pupils: Pupils are equal, round, and reactive to light. Cardiovascular:      Rate and Rhythm: Normal rate and regular rhythm. Pulmonary:      Effort: Pulmonary effort is normal. No respiratory distress. Abdominal:      General: Abdomen is flat. There is no distension. Palpations: Abdomen is soft. Musculoskeletal:         General: No swelling or deformity. Cervical back: Normal range of motion and neck supple.    Skin:     General: Skin is warm. Coloration: Skin is not jaundiced or pale. Neurological:      General: No focal deficit present. Mental Status: Mental status is at baseline. Psychiatric:         Mood and Affect: Mood normal.         Behavior: Behavior normal.      Comments:                       Data Review:    Review and/or order of clinical lab test      Labs:     No results for input(s): WBC, HGB, HCT, PLT, HGBEXT, HCTEXT, PLTEXT, HGBEXT, HCTEXT, PLTEXT in the last 72 hours. No results for input(s): NA, K, CL, CO2, BUN, CREA, GLU, CA, MG, PHOS, URICA in the last 72 hours. No results for input(s): ALT, AP, TBIL, TBILI, TP, ALB, GLOB, GGT, AML, LPSE in the last 72 hours. No lab exists for component: SGOT, GPT, AMYP, HLPSE  Recent Labs     03/20/22  0515 03/19/22  0049   INR 1.1 1.1   PTP 11.8* 11.3*      No results for input(s): FE, TIBC, PSAT, FERR in the last 72 hours. Lab Results   Component Value Date/Time    Folate 7.9 03/17/2022 06:23 AM      No results for input(s): PH, PCO2, PO2 in the last 72 hours. No results for input(s): CPK, CKNDX, TROIQ in the last 72 hours.     No lab exists for component: CPKMB  Lab Results   Component Value Date/Time    Cholesterol, total 230 (H) 03/17/2022 06:23 AM    HDL Cholesterol 56 03/17/2022 06:23 AM    LDL, calculated 153.8 (H) 03/17/2022 06:23 AM    Triglyceride 101 03/17/2022 06:23 AM    CHOL/HDL Ratio 4.1 03/17/2022 06:23 AM     No results found for: [facility]  Lab Results   Component Value Date/Time    Color YELLOW/STRAW 03/19/2022 12:54 AM    Appearance CLEAR 03/19/2022 12:54 AM    Specific gravity 1.016 03/19/2022 12:54 AM    pH (UA) 5.5 03/19/2022 12:54 AM    Protein Negative 03/19/2022 12:54 AM    Glucose Negative 03/19/2022 12:54 AM    Ketone Negative 03/19/2022 12:54 AM    Bilirubin Negative 03/19/2022 12:54 AM    Urobilinogen 1.0 03/19/2022 12:54 AM    Nitrites Negative 03/19/2022 12:54 AM    Leukocyte Esterase LARGE (A) 03/19/2022 12:54 AM    Epithelial cells FEW 03/19/2022 12:54 AM    Bacteria Negative 03/19/2022 12:54 AM    WBC 20-50 03/19/2022 12:54 AM    RBC 0-5 03/19/2022 12:54 AM         Medications Reviewed:     Current Facility-Administered Medications   Medication Dose Route Frequency    midodrine (PROAMATINE) tablet 5 mg  5 mg Oral BID WITH MEALS    acetaminophen (TYLENOL) tablet 650 mg  650 mg Oral Q4H PRN    [Held by provider] lisinopriL (PRINIVIL, ZESTRIL) tablet 10 mg  10 mg Oral DAILY    risperiDONE (RisperDAL) tablet 1 mg  1 mg Oral BID    memantine (NAMENDA) tablet 5 mg  5 mg Oral BID    sertraline (ZOLOFT) tablet 25 mg  25 mg Oral DAILY    oxybutynin (DITROPAN) tablet 5 mg  5 mg Oral BID    HYDROcodone-acetaminophen (NORCO) 5-325 mg per tablet 1 Tablet  1 Tablet Oral Q6H PRN     ______________________________________________________________________  EXPECTED LENGTH OF STAY: 2d 21h  ACTUAL LENGTH OF STAY:          5                 Madina Gerber MD

## 2022-03-21 NOTE — CONSULTS
Cardiac Electrophysiology Hospital Consultation Note   REFERRING PROVIDER: Dr Matos Files  Subjective:       Ren Day is a 82 y.o. patient who is seen for evaluation of pacemaker  She needs MRI and has not had this checked for a while  I called her son   He told me it [facility] have been at [facility]  She has AVR  She had hip replacement  He does not know her cardiologist       Patient Active Problem List   Diagnosis Code    Spinal stenosis in cervical region M48.02     Current Facility-Administered Medications   Medication Dose Route Frequency Provider Last Rate Last Admin    midodrine (PROAMATINE) tablet 5 mg  5 mg Oral BID WITH MEALS Cesia Zayas MD   5 mg at 03/21/22 1643    acetaminophen (TYLENOL) tablet 650 mg  650 mg Oral Q4H PRN Kavon Natrona, DO   650 mg at 03/17/22 1146    [Held by provider] lisinopriL (PRINIVIL, ZESTRIL) tablet 10 mg  10 mg Oral DAILY Michelle Alderman Freddy, DO   10 mg at 03/17/22 4171    risperiDONE (RisperDAL) tablet 1 mg  1 mg Oral BID Kavon Natrona, DO   1 mg at 03/21/22 1743    memantine (NAMENDA) tablet 5 mg  5 mg Oral BID Kavon Natrona, DO   5 mg at 03/21/22 1745    sertraline (ZOLOFT) tablet 25 mg  25 mg Oral DAILY Kavon Natrona, DO   25 mg at 03/21/22 1703    oxybutynin (DITROPAN) tablet 5 mg  5 mg Oral BID Michelle Alderman Freddy, DO   5 mg at 03/21/22 1743    HYDROcodone-acetaminophen ([place] Plater) 5-325 mg per tablet 1 Tablet  1 Tablet Oral Q6H PRN Cesia Zayas MD   1 Tablet at 03/20/22 0531     Allergies   Allergen Reactions    Omeprazole Angioedema     Past Medical History:   Diagnosis Date    Arthritis     Dementia ([address]     High cholesterol     Hypertension      Past Surgical History:   Procedure Laterality Date    HX ORTHOPAEDIC      HX PACEMAKER      MI CARDIAC SURG PROCEDURE UNLIST      Aortic Valve Replacement     Family History   Problem Relation Age of Onset    Dementia Other     Heart Disease Other      Social History     Tobacco Use    Smoking status: Never Smoker    Smokeless tobacco: Never Used   Substance Use Topics    Alcohol use: No        Review of Systems: difficult to get due to mental status/dementia  Treated for UTI, spinal stenosis  Falls  Back pain hip pain     Objective:     Visit Vitals  BP (!) 173/81   Pulse 70   Temp 98.8 °F (37.1 °C)   Resp 20   Ht 5' (1.524 m)   Wt 137 lb 9.1 oz (62.4 kg)   SpO2 95%   Breastfeeding No   BMI 26.87 kg/m²      Physical Exam:   Constitutional: No respiratory distress. Head: Normocephalic and atraumatic. Eyes: Pupils are equal, round  ENT: hearing normal  Neck: supple. No JVD present. Cardiovascular: Normal rate, regular rhythm. Exam reveals no gallop and no friction rub. No murmur heard. Pulmonary/Chest: Effort normal and breath sounds normal. No wheezes. Abdominal: Soft   Musculoskeletal: normal leg movement  Neurological: arsousable  Skin: Skin is warm        Assessment/Plan:   UTI with hypotension, ? Sepsis  Fall with back pain, spinal stenosis  Dementia  AVR  Pacemaker     I called [employer] rep to check her pacer and I filled out MRI form and gave to a nurse in [id number] rep checked pacer   3 years battery  Model [employer] MRI DR  6164 RA and RV lead MRI compatible  All parameters are normal  Thresholds less than 1V @ 0.4 ms ([place] Person checked it, confirmed with [place])  RA pacing 76%  RV pacing < 1%  She has had   29 PAT and 1 hr PAF 3/2/22  1-7 second NSVT    Not on coumadin any more  Check echo for LVEF and AVR      Thank you for involving me in this patient's care and please call with further concerns or questions. Jose Lovelace M.D.   Electrophysiology/Cardiology  [facility]  [address] [place] [address]                                [phone]

## 2022-03-21 NOTE — PROGRESS NOTES
MRI SAFETY UPDATE:    We have reached out to patient's cardiologist multiple times but have received no response to request for order for MRI pacemaker settings. Was able to determine that patient has not been seen by MD who implanted pacemaker since placement, and device has not been interrogated since 2019. At this point, we recommend that either an inpatient EP cardiology consult is ordered to obtain the MRI pacemaker settings, or wait for patient to see an EP cardiologist as an outpatient after discharge, or ordering MD can choose an alternative imaging modality. I will communicate this info to the assigned unit RN.     Anish Vargas, RN  [facility]  [phone]

## 2022-03-21 NOTE — PROGRESS NOTES
Full consult note to follow  I spoke to her son and he can only tell me she has [employer] pacer  Chest xray showed dual chamber  She has aortic valve replacement on chest xray  He also said she has hip replacement    I called [employer] [facility] to check her pacer and I filled out MRI form and gave to a nurse in [address] [facility]. check pacer 3 years battery  [employer]visa MRI DR  4857 RA and RV lead MRI compatible  All parameters normal  Thresholds less than 1  RA pacing 76%  RV pacing < 1%    29 PAT and 1 hr PAF 3/2/22  1-7 second NSVT

## 2022-03-21 NOTE — PROGRESS NOTES
Bedside and Verbal shift change report given to [place] (oncoming nurse) by Jordan Pennington (offgoing nurse). Report included the following information SBAR, Kardex, Intake/Output, MAR and Recent Results.

## 2022-03-21 NOTE — PROGRESS NOTES
Problem: Falls - Risk of  Goal: *Absence of Falls  Description: Document Giovanny Childs Fall Risk and appropriate interventions in the flowsheet.   Outcome: Progressing Towards Goal  Note: Fall Risk Interventions:  Mobility Interventions: Bed/chair exit alarm,Communicate number of staff needed for ambulation/transfer,Assess mobility with egress test,Patient to call before getting OOB,PT Consult for mobility concerns,PT Consult for assist device competence,Strengthening exercises (ROM-active/passive)    Mentation Interventions: Adequate sleep, hydration, pain control,Bed/chair exit alarm,Door open when patient unattended,Increase mobility,More frequent rounding,Reorient patient,Room close to nurse's station,Update white board    Medication Interventions: Assess postural VS orthostatic hypotension,Patient to call before getting OOB,Teach patient to arise slowly,Bed/chair exit alarm    Elimination Interventions: Bed/chair exit alarm,Call light in reach,Patient to call for help with toileting needs,Stay With Me (per policy)    History of Falls Interventions: Bed/chair exit alarm,Consult care management for discharge planning,Door open when patient unattended,Investigate reason for fall,Room close to nurse's station

## 2022-03-21 NOTE — PROGRESS NOTES
Problem: Self Care Deficits Care Plan (Adult)  Goal: *Acute Goals and Plan of Care (Insert Text)  Description: FUNCTIONAL STATUS PRIOR TO ADMISSION: Patient was independent with ADLs and functional mobility, ambulatory with RW. Endorses 1 recent GLF. Note patient has dementia. HOME SUPPORT: Patient lived alone and reports her son, who lives next door, visited daily. Occupational Therapy Goals  Initiated 3/18/2022  1. Patient will perform self-feeding with supervision/set-up within 7 day(s). 2.  Patient will perform grooming with supervision/set-up within 7 day(s). 3.  Patient will perform upper body dressing with minimal assistance within 7 day(s). 4.  Patient will perform toilet transfers with moderate assistance  within 7 day(s). 5.  Patient will perform all aspects of toileting with moderate assistance  within 7 day(s). 6.  Patient will utilize fall prevention techniques during functional activities with verbal cues within 7 day(s). Outcome: Progressing Towards Goal   OCCUPATIONAL THERAPY TREATMENT  Patient: Kamala Valentino (82 y.o. female)  Date: 3/21/2022  Diagnosis: Spinal stenosis in cervical region [M48.02] <principal problem not specified>       Precautions: Fall (C-collar)  Chart, occupational therapy assessment, plan of care, and goals were reviewed. ASSESSMENT  Patient continues with skilled OT services and is progressing towards goals. Pt improved with mobility demonstrated requiring less assistance with bed mobility and able to access chair with min to mod assist x 2. Pt limited by posterior lean with sitting and standing (using RW). Continuous verbal/tactile cues to adhere to neck precautions even though she had Aspen collar securely positioned . Feel pt is not safe to return home alone and will need further therapy at discharge. Anticipate family will need to look into 24/7 care vs LTC once therapy is completed d/t dementia and h/o of falls. Will con't to follow. Current Level of Function Impacting Discharge (ADLs): min to mod assist with transfers and LB self-care    Other factors to consider for discharge: lives alone, h/o of falls, dementia         PLAN :  Patient continues to benefit from skilled intervention to address the above impairments. Continue treatment per established plan of care to address goals. Recommend with staff: [facility]     Recommend next OT session: see goals    Recommendation for discharge: (in order for the patient to meet his/her long term goals)  Therapy up to 5 days/week in SNF setting    This discharge recommendation:  A follow-up discussion with the attending provider and/or case management is planned    IF patient discharges home will need the following DME: TBD       SUBJECTIVE:   Patient stated I dont' want to land on my nose again.     OBJECTIVE DATA SUMMARY:   Cognitive/Behavioral Status:  Neurologic State: Alert  Orientation Level: Oriented to person;Oriented to place;Oriented to situation           Safety/Judgement: Awareness of environment    Functional Mobility and Transfers for ADLs:  Bed Mobility:  Supine to Sit: Minimum assistance;Assist x2    Transfers:  Sit to Stand: Minimum assistance;Assist x2  Functional Transfers  Toilet Transfer : Minimum assistance; Moderate assistance;Assist x2  Adaptive Equipment: Walker (comment)  Bed to Chair: Minimum assistance; Moderate assistance;Assist x2    Balance:  Sitting: Impaired  Sitting - Static: Fair (occasional)  Standing: Impaired; With support  Standing - Static: Constant support    ADL Intervention:           Lower Body Dressing Assistance  Socks: Minimum assistance; Moderate assistance; Compensatory technique training (min assist R; mod assist L)  Leg Crossed Method Used: Yes  Position Performed: Seated in chair         Cognitive Retraining  Safety/Judgement: Awareness of environment        Pain:  No c/o pain    Activity Tolerance:   Good    After treatment patient left in no apparent distress:   Sitting in chair, Call bell within reach, and Bed / chair alarm activated    COMMUNICATION/COLLABORATION:   The patients plan of care was discussed with: Physical therapist.     Andrea Knight OTR/L  Time Calculation: 23 mins

## 2022-03-21 NOTE — PROGRESS NOTES
Clarified pt's device model numbers with [employer] Help Line. Ms. Kraig Pulilam has a [employer] [id number] [place], with lead # 5076-52cm in RA and 4074-58cm in RV. [employer] available 3/22 at 12pm, will send for pt to be in dept at that time.

## 2022-03-21 NOTE — PROGRESS NOTES
Problem: Falls - Risk of  Goal: *Absence of Falls  Description: Document Kaya Marking Fall Risk and appropriate interventions in the flowsheet. Outcome: Progressing Towards Goal  Note: Fall Risk Interventions:  Mobility Interventions: Bed/chair exit alarm,Communicate number of staff needed for ambulation/transfer,OT consult for ADLs,Patient to call before getting OOB,PT Consult for mobility concerns,PT Consult for assist device competence,Strengthening exercises (ROM-active/passive),Utilize gait belt for transfers/ambulation    Mentation Interventions: Adequate sleep, hydration, pain control,Bed/chair exit alarm,Door open when patient unattended,Evaluate medications/consider consulting pharmacy,Increase mobility,More frequent rounding,Reorient patient    Medication Interventions: Assess postural VS orthostatic hypotension,Bed/chair exit alarm,Evaluate medications/consider consulting pharmacy,Patient to call before getting OOB,Teach patient to arise slowly,Utilize gait belt for transfers/ambulation    Elimination Interventions: Bed/chair exit alarm,Call light in reach,Patient to call for help with toileting needs,Stay With Me (per policy),Toilet paper/wipes in reach,Toileting schedule/hourly rounds    History of Falls Interventions: Bed/chair exit alarm,Consult care management for discharge planning,Door open when patient unattended,Evaluate medications/consider consulting pharmacy,Utilize gait belt for transfer/ambulation         Problem: Patient Education: Go to Patient Education Activity  Goal: Patient/Family Education  Outcome: Progressing Towards Goal     Problem: Impaired Skin Integrity/Pressure Injury Treatment  Goal: *Improvement of Existing Pressure Injury  Outcome: Progressing Towards Goal  Goal: *Prevention of pressure injury  Description: Document Cooper Scale and appropriate interventions in the flowsheet.   Outcome: Progressing Towards Goal  Note: Pressure Injury Interventions:  Sensory Interventions: Assess changes in LOC,Avoid rigorous massage over bony prominences,Check visual cues for pain,Discuss PT/OT consult with provider,Float heels,Keep linens dry and wrinkle-free,Minimize linen layers,Pad between skin to skin,Pressure redistribution bed/mattress (bed type)    Moisture Interventions: Absorbent underpads,Internal/External urinary devices,Limit adult briefs,Minimize layers    Activity Interventions: Increase time out of bed,Pressure redistribution bed/mattress(bed type),PT/OT evaluation    Mobility Interventions: HOB 30 degrees or less,Pressure redistribution bed/mattress (bed type),PT/OT evaluation    Nutrition Interventions: Document food/fluid/supplement intake    Friction and Shear Interventions: HOB 30 degrees or less,Foam dressings/transparent film/skin sealants,Minimize layers                Problem: Patient Education: Go to Patient Education Activity  Goal: Patient/Family Education  Outcome: Progressing Towards Goal     Problem: Patient Education: Go to Patient Education Activity  Goal: Patient/Family Education  Outcome: Progressing Towards Goal     Problem: Patient Education: Go to Patient Education Activity  Goal: Patient/Family Education  Outcome: Progressing Towards Goal     Problem: Non-Violent Restraints  Goal: Removal from restraints as soon as assessed to be safe  Outcome: Progressing Towards Goal  Goal: No harm/injury to patient while restraints in use  Outcome: Progressing Towards Goal  Goal: Patient's dignity will be maintained  Outcome: Progressing Towards Goal  Goal: Patient Interventions  Outcome: Progressing Towards Goal

## 2022-03-22 ENCOUNTER — APPOINTMENT (OUTPATIENT)
Dept: MRI IMAGING | Age: 87
DRG: 552 | End: 2022-03-22
Attending: PSYCHIATRY & NEUROLOGY
Payer: MEDICARE

## 2022-03-22 PROCEDURE — 74011250637 HC RX REV CODE- 250/637: Performed by: STUDENT IN AN ORGANIZED HEALTH CARE EDUCATION/TRAINING PROGRAM

## 2022-03-22 PROCEDURE — 72141 MRI NECK SPINE W/O DYE: CPT

## 2022-03-22 PROCEDURE — 74011250637 HC RX REV CODE- 250/637: Performed by: INTERNAL MEDICINE

## 2022-03-22 PROCEDURE — 70551 MRI BRAIN STEM W/O DYE: CPT

## 2022-03-22 PROCEDURE — 65660000000 HC RM CCU STEPDOWN

## 2022-03-22 RX ADMIN — RISPERIDONE 1 MG: 1 TABLET, FILM COATED ORAL at 08:25

## 2022-03-22 RX ADMIN — OXYBUTYNIN CHLORIDE 5 MG: 5 TABLET ORAL at 17:00

## 2022-03-22 RX ADMIN — OXYBUTYNIN CHLORIDE 5 MG: 5 TABLET ORAL at 08:25

## 2022-03-22 RX ADMIN — SERTRALINE 25 MG: 50 TABLET, FILM COATED ORAL at 08:25

## 2022-03-22 RX ADMIN — MEMANTINE HYDROCHLORIDE 5 MG: 5 TABLET, FILM COATED ORAL at 17:00

## 2022-03-22 RX ADMIN — MIDODRINE HYDROCHLORIDE 5 MG: 5 TABLET ORAL at 16:54

## 2022-03-22 RX ADMIN — RISPERIDONE 1 MG: 1 TABLET, FILM COATED ORAL at 17:00

## 2022-03-22 RX ADMIN — MIDODRINE HYDROCHLORIDE 5 MG: 5 TABLET ORAL at 08:25

## 2022-03-22 RX ADMIN — MEMANTINE HYDROCHLORIDE 5 MG: 5 TABLET, FILM COATED ORAL at 08:25

## 2022-03-22 NOTE — PROGRESS NOTES
Transition of Care Plan: SNF   Referrals: [facility]-pending  [place]-pending     RUR: 10% low     PCP F/U: Dr. Jonah Davis     Disposition: SNF     Transportation: BLS     Main Contact: Scott KEEN [phone]    1214: Telephone call to son Rochelle Tanner to provide more SNF choices. Left message to return call. 1221: Telephone call to the [facility] at [phone]. Left message for admissions to return call. 1253: Spoke with [facility]. Has asked this CM to upload updated clinicals and send on allscripts. States she will send it over to her team for review. Clinicals sent. [phone]: Family brought in a list of two additional places they would like referrals sent to: [place]. Referral sent via [facility].      Que Meredith RN, CRM

## 2022-03-22 NOTE — PROGRESS NOTES
0800: Bedside and Verbal shift change report given to [address] [place] [address] (oncoming nurse) by Lily Friedman (offgoing nurse). Report included the following information SBAR, MAR and Recent Results    1050: Patient off floor for MRI    2000: Bedside and Verbal shift change report given to Lily Friedman (oncoming nurse) by [address] [place] [address] (offgoing nurse). Report included the following information SBAR, MAR and Recent Results. Care Plan:  Problem: Falls - Risk of  Goal: *Absence of Falls  Description: Document Christopher Fall Risk and appropriate interventions in the flowsheet. Outcome: Progressing Towards Goal  Note: Fall Risk Interventions:  Mobility Interventions: Bed/chair exit alarm    Mentation Interventions: Adequate sleep, hydration, pain control    Medication Interventions: Bed/chair exit alarm    Elimination Interventions: Bed/chair exit alarm    History of Falls Interventions: Bed/chair exit alarm         Problem: Impaired Skin Integrity/Pressure Injury Treatment  Goal: *Prevention of pressure injury  Description: Document Cooper Scale and appropriate interventions in the flowsheet.   Outcome: Progressing Towards Goal  Note: Pressure Injury Interventions:  Sensory Interventions: Assess changes in LOC    Moisture Interventions: Absorbent underpads    Activity Interventions: Assess need for specialty bed    Mobility Interventions: Assess need for specialty bed    Nutrition Interventions: Document food/fluid/supplement intake    Friction and Shear Interventions: Apply protective barrier, creams and emollients

## 2022-03-22 NOTE — PROGRESS NOTES
[facility] Adult  Hospitalist Group                                                                                          Hospitalist Progress Note  Sruthi Redmond MD  Answering service: [phone] OR [phone] from in house phone        Date of Service:  3/22/2022  NAME:  Aditya Wallace  :  1935  MRN:  [id number]      Admission Summary:   Copied form admit: \"    82 y.o. female presents with several days duration of abrupt onset, constant, gradually worsening, severe, nonradiating, sharp, bilateral hip pain is associated bilateral black eyes and happened after a fall. Patient denies exacerbating features  and denies remitting features.     Patient fell last week onto her nose, she is doing just fine, but woke up this morning she is not feeling well, could not walk. Incidental findings on imaging showed cervical stenosis, for which the emergency department consulted neurosurgery, who suggested that the patient be admitted and MRI be completed.  \"    Interval history / Subjective:     3/22/2022 :    Confusional last pm acting out w same   MRI completed , await final results   [address] [place]:     Spinal stenosis in cervical region (3/16/2022)  3/17:   Stabilized w cervical collar til needs proven otherwise via MRI cervical neck   3/18:   Cont in cervical collar   MRI pending   Pt hypotensive through night and am 3/18/2022  - not seemingly vol responsive    ICU intensivist to eval   Septic w/u started  3/21: above resovled, treated w abx for uti ; else    Await MRI   Cont on cervcial colar, minor    Cardiology to clear for MRI   3/22:   Confusional last pm acting out w same   MRI completed , await final results    Else     Aortic lyla replacement status, off [address] x past 2 years on ASA per last notes and pt confirms same \"Coumadin was replaced with low dose ASA\"     UTI, rocephin, cult ( 3/19)     Hypotension resolved as an acute issue           Lyla heart dz w prosthetic valve; off [address] x past 2 years on ASA per last notes and pt confirms same \"Coumadin was replaced with low dose ASA\"     Hyperlipidemia    Dementia        Hospital Problems  Date Reviewed: 3/17/2022          Codes Class Noted POA    Spinal stenosis in cervical region ICD-10-CM: M48.02  ICD-9-CM: 723.0  3/16/2022 Unknown                  After personally interviewing pt at bedside the following is noted on 3/22/2022 :    Review of Systems   Constitutional: Negative for chills, fever, malaise/fatigue and weight loss. Respiratory: Negative. Cardiovascular: Negative for chest pain. All other systems reviewed and are negative. I had a face to face encounter with patient on 3/22/2022 at bedside for the following physical exam:     PHYSICAL EXAM:    Visit Vitals  /65 (BP 1 Location: Left arm, BP Patient Position: At rest)   Pulse 67   Temp 98.5 °F (36.9 °C)   Resp 16   Ht 5' (1.524 m)   Wt 62.4 kg (137 lb 9.1 oz)   SpO2 92%   Breastfeeding No   BMI 26.87 kg/m²          Physical Exam  Constitutional:       General: She is not in acute distress. Appearance: She is not ill-appearing, toxic-appearing or diaphoretic. HENT:      Head: Normocephalic and atraumatic. Right Ear: External ear normal.      Left Ear: External ear normal.      Nose: Nose normal.      Mouth/Throat:      Mouth: Mucous membranes are dry. Pharynx: Oropharynx is clear. Eyes:      General:         Right eye: No discharge. Left eye: No discharge. Extraocular Movements: Extraocular movements intact. Conjunctiva/sclera: Conjunctivae normal.      Pupils: Pupils are equal, round, and reactive to light. Cardiovascular:      Rate and Rhythm: Normal rate and regular rhythm. Pulmonary:      Effort: Pulmonary effort is normal. No respiratory distress. Abdominal:      General: Abdomen is flat. There is no distension. Palpations: Abdomen is soft.    Musculoskeletal:         General: No swelling or deformity. Cervical back: Normal range of motion and neck supple. Skin:     General: Skin is warm. Coloration: Skin is not jaundiced or pale. Neurological:      General: No focal deficit present. Mental Status: Mental status is at baseline. Psychiatric:         Mood and Affect: Mood normal.         Behavior: Behavior normal.      Comments:                       Data Review:    Review and/or order of clinical lab test      Labs:     No results for input(s): WBC, HGB, HCT, PLT, HGBEXT, HCTEXT, PLTEXT, HGBEXT, HCTEXT, PLTEXT in the last 72 hours. No results for input(s): NA, K, CL, CO2, BUN, CREA, GLU, CA, MG, PHOS, URICA in the last 72 hours. No results for input(s): ALT, AP, TBIL, TBILI, TP, ALB, GLOB, GGT, AML, LPSE in the last 72 hours. No lab exists for component: SGOT, GPT, AMYP, HLPSE  Recent Labs     03/20/22  0515   INR 1.1   PTP 11.8*      No results for input(s): FE, TIBC, PSAT, FERR in the last 72 hours. Lab Results   Component Value Date/Time    Folate 7.9 03/17/2022 06:23 AM      No results for input(s): PH, PCO2, PO2 in the last 72 hours. No results for input(s): CPK, CKNDX, TROIQ in the last 72 hours.     No lab exists for component: CPKMB  Lab Results   Component Value Date/Time    Cholesterol, total 230 (H) 03/17/2022 06:23 AM    HDL Cholesterol 56 03/17/2022 06:23 AM    LDL, calculated 153.8 (H) 03/17/2022 06:23 AM    Triglyceride 101 03/17/2022 06:23 AM    CHOL/HDL Ratio 4.1 03/17/2022 06:23 AM     No results found for: [facility]  Lab Results   Component Value Date/Time    Color YELLOW/STRAW 03/19/2022 12:54 AM    Appearance CLEAR 03/19/2022 12:54 AM    Specific gravity 1.016 03/19/2022 12:54 AM    pH (UA) 5.5 03/19/2022 12:54 AM    Protein Negative 03/19/2022 12:54 AM    Glucose Negative 03/19/2022 12:54 AM    Ketone Negative 03/19/2022 12:54 AM    Bilirubin Negative 03/19/2022 12:54 AM    Urobilinogen 1.0 03/19/2022 12:54 AM    Nitrites Negative 03/19/2022 12:54 AM Leukocyte Esterase LARGE (A) 03/19/2022 12:54 AM    Epithelial cells FEW 03/19/2022 12:54 AM    Bacteria Negative 03/19/2022 12:54 AM    WBC 20-50 03/19/2022 12:54 AM    RBC 0-5 03/19/2022 12:54 AM         Medications Reviewed:     Current Facility-Administered Medications   Medication Dose Route Frequency    midodrine (PROAMATINE) tablet 5 mg  5 mg Oral BID WITH MEALS    acetaminophen (TYLENOL) tablet 650 mg  650 mg Oral Q4H PRN    [Held by provider] lisinopriL (PRINIVIL, ZESTRIL) tablet 10 mg  10 mg Oral DAILY    risperiDONE (RisperDAL) tablet 1 mg  1 mg Oral BID    memantine (NAMENDA) tablet 5 mg  5 mg Oral BID    sertraline (ZOLOFT) tablet 25 mg  25 mg Oral DAILY    oxybutynin (DITROPAN) tablet 5 mg  5 mg Oral BID    HYDROcodone-acetaminophen (NORCO) 5-325 mg per tablet 1 Tablet  1 Tablet Oral Q6H PRN     ______________________________________________________________________  EXPECTED LENGTH OF STAY: 2d 21h  ACTUAL LENGTH OF STAY:          6                 Melody Cartagena MD

## 2022-03-22 NOTE — PROGRESS NOTES
Cervical MRI reviewed. Severe stenosis at C3/4 with cord compression. 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 Regarding cervical stenosis - this can be corrected with ACDF at 3/4. However, there are risks with surgery given her age, baseline dementia, Brain MRI showing multi small infarcts and history of cardiac disease. I will reach out to family tomorrow to determine if surgery is something they would want to consider. If so, she will need to be optimized medically prior to surgery (I.e. determine source of infarcts). Continue cervical collar.

## 2022-03-22 NOTE — PROGRESS NOTES
Physical Therapy: Defer     Pt chart reviewed and attempted to see for PT treatment. Pt currently off the floor and is unavailable for treatment at this time. Will defer and follow up as able.      Alonso Jacobs, SPT

## 2022-03-22 NOTE — ROUTINE PROCESS
Bedside and Verbal shift change report given to rolando william (oncoming nurse) by Asa Sánchez (offgoing nurse). Report included the following information SBAR, Kardex, Intake/Output, Recent Results and Cardiac Rhythm paced/SR.

## 2022-03-23 ENCOUNTER — APPOINTMENT (OUTPATIENT)
Dept: GENERAL RADIOLOGY | Age: 87
DRG: 552 | End: 2022-03-23
Attending: INTERNAL MEDICINE
Payer: MEDICARE

## 2022-03-23 LAB
ANION GAP SERPL CALC-SCNC: 4 MMOL/L (ref 5–15)
APTT PPP: 26.9 SEC (ref 22.1–31)
BACTERIA SPEC CULT: NORMAL
BASOPHILS # BLD: 0.1 K/UL (ref 0–0.1)
BASOPHILS # BLD: 0.1 K/UL (ref 0–0.1)
BASOPHILS NFR BLD: 1 % (ref 0–1)
BASOPHILS NFR BLD: 1 % (ref 0–1)
BUN SERPL-MCNC: 30 MG/DL (ref 6–20)
BUN/CREAT SERPL: 41 (ref 12–20)
CALCIUM SERPL-MCNC: 8.5 MG/DL (ref 8.5–10.1)
CHLORIDE SERPL-SCNC: 108 MMOL/L (ref 97–108)
CO2 SERPL-SCNC: 26 MMOL/L (ref 21–32)
COVID-19 RAPID TEST, COVR: NOT DETECTED
CREAT SERPL-MCNC: 0.73 MG/DL (ref 0.55–1.02)
DIFFERENTIAL METHOD BLD: ABNORMAL
DIFFERENTIAL METHOD BLD: ABNORMAL
EOSINOPHIL # BLD: 1.1 K/UL (ref 0–0.4)
EOSINOPHIL # BLD: 1.3 K/UL (ref 0–0.4)
EOSINOPHIL NFR BLD: 14 % (ref 0–7)
EOSINOPHIL NFR BLD: 16 % (ref 0–7)
ERYTHROCYTE [DISTWIDTH] IN BLOOD BY AUTOMATED COUNT: 14.6 % (ref 11.5–14.5)
ERYTHROCYTE [DISTWIDTH] IN BLOOD BY AUTOMATED COUNT: 14.6 % (ref 11.5–14.5)
GLUCOSE SERPL-MCNC: 100 MG/DL (ref 65–100)
HCT VFR BLD AUTO: 29.4 % (ref 35–47)
HCT VFR BLD AUTO: 29.5 % (ref 35–47)
HGB BLD-MCNC: 9.3 G/DL (ref 11.5–16)
HGB BLD-MCNC: 9.4 G/DL (ref 11.5–16)
IMM GRANULOCYTES # BLD AUTO: 0.1 K/UL (ref 0–0.04)
IMM GRANULOCYTES # BLD AUTO: 0.1 K/UL (ref 0–0.04)
IMM GRANULOCYTES NFR BLD AUTO: 1 % (ref 0–0.5)
IMM GRANULOCYTES NFR BLD AUTO: 1 % (ref 0–0.5)
LYMPHOCYTES # BLD: 1.7 K/UL (ref 0.8–3.5)
LYMPHOCYTES # BLD: 2 K/UL (ref 0.8–3.5)
LYMPHOCYTES NFR BLD: 22 % (ref 12–49)
LYMPHOCYTES NFR BLD: 24 % (ref 12–49)
MAGNESIUM SERPL-MCNC: 2.3 MG/DL (ref 1.6–2.4)
MCH RBC QN AUTO: 28.8 PG (ref 26–34)
MCH RBC QN AUTO: 29 PG (ref 26–34)
MCHC RBC AUTO-ENTMCNC: 31.5 G/DL (ref 30–36.5)
MCHC RBC AUTO-ENTMCNC: 32 G/DL (ref 30–36.5)
MCV RBC AUTO: 90.2 FL (ref 80–99)
MCV RBC AUTO: 91.9 FL (ref 80–99)
MONOCYTES # BLD: 0.7 K/UL (ref 0–1)
MONOCYTES # BLD: 0.8 K/UL (ref 0–1)
MONOCYTES NFR BLD: 10 % (ref 5–13)
MONOCYTES NFR BLD: 9 % (ref 5–13)
NEUTS SEG # BLD: 4.1 K/UL (ref 1.8–8)
NEUTS SEG # BLD: 4.1 K/UL (ref 1.8–8)
NEUTS SEG NFR BLD: 48 % (ref 32–75)
NEUTS SEG NFR BLD: 53 % (ref 32–75)
NRBC # BLD: 0 K/UL (ref 0–0.01)
NRBC # BLD: 0 K/UL (ref 0–0.01)
NRBC BLD-RTO: 0 PER 100 WBC
NRBC BLD-RTO: 0 PER 100 WBC
PLATELET # BLD AUTO: 264 K/UL (ref 150–400)
PLATELET # BLD AUTO: 294 K/UL (ref 150–400)
PMV BLD AUTO: 10 FL (ref 8.9–12.9)
POTASSIUM SERPL-SCNC: 4.6 MMOL/L (ref 3.5–5.1)
RBC # BLD AUTO: 3.21 M/UL (ref 3.8–5.2)
RBC # BLD AUTO: 3.26 M/UL (ref 3.8–5.2)
RBC MORPH BLD: ABNORMAL
SERVICE CMNT-IMP: NORMAL
SODIUM SERPL-SCNC: 138 MMOL/L (ref 136–145)
SOURCE, COVRS: NORMAL
THERAPEUTIC RANGE,PTTT: NORMAL SECS (ref 58–77)
WBC # BLD AUTO: 7.8 K/UL (ref 3.6–11)
WBC # BLD AUTO: 8.4 K/UL (ref 3.6–11)

## 2022-03-23 PROCEDURE — 83735 ASSAY OF MAGNESIUM: CPT

## 2022-03-23 PROCEDURE — 85025 COMPLETE CBC W/AUTO DIFF WBC: CPT

## 2022-03-23 PROCEDURE — 71045 X-RAY EXAM CHEST 1 VIEW: CPT

## 2022-03-23 PROCEDURE — 51798 US URINE CAPACITY MEASURE: CPT

## 2022-03-23 PROCEDURE — 85730 THROMBOPLASTIN TIME PARTIAL: CPT

## 2022-03-23 PROCEDURE — 95816 EEG AWAKE AND DROWSY: CPT | Performed by: INTERNAL MEDICINE

## 2022-03-23 PROCEDURE — 80048 BASIC METABOLIC PNL TOTAL CA: CPT

## 2022-03-23 PROCEDURE — 36415 COLL VENOUS BLD VENIPUNCTURE: CPT

## 2022-03-23 PROCEDURE — 65660000000 HC RM CCU STEPDOWN

## 2022-03-23 PROCEDURE — 74011250637 HC RX REV CODE- 250/637: Performed by: INTERNAL MEDICINE

## 2022-03-23 PROCEDURE — 74011250637 HC RX REV CODE- 250/637: Performed by: STUDENT IN AN ORGANIZED HEALTH CARE EDUCATION/TRAINING PROGRAM

## 2022-03-23 PROCEDURE — 87635 SARS-COV-2 COVID-19 AMP PRB: CPT

## 2022-03-23 PROCEDURE — 99233 SBSQ HOSP IP/OBS HIGH 50: CPT | Performed by: PSYCHIATRY & NEUROLOGY

## 2022-03-23 PROCEDURE — 74011250636 HC RX REV CODE- 250/636: Performed by: INTERNAL MEDICINE

## 2022-03-23 PROCEDURE — 97530 THERAPEUTIC ACTIVITIES: CPT

## 2022-03-23 RX ORDER — TAMSULOSIN HYDROCHLORIDE 0.4 MG/1
0.4 CAPSULE ORAL DAILY
Status: DISCONTINUED | OUTPATIENT
Start: 2022-03-23 | End: 2022-03-25 | Stop reason: HOSPADM

## 2022-03-23 RX ORDER — WARFARIN 2 MG/1
4 TABLET ORAL ONCE
Status: COMPLETED | OUTPATIENT
Start: 2022-03-23 | End: 2022-03-23

## 2022-03-23 RX ORDER — HEPARIN SODIUM 1000 [USP'U]/ML
60 INJECTION, SOLUTION INTRAVENOUS; SUBCUTANEOUS ONCE
Status: COMPLETED | OUTPATIENT
Start: 2022-03-23 | End: 2022-03-23

## 2022-03-23 RX ORDER — ROSUVASTATIN CALCIUM 10 MG/1
20 TABLET, COATED ORAL
Status: DISCONTINUED | OUTPATIENT
Start: 2022-03-23 | End: 2022-03-25 | Stop reason: HOSPADM

## 2022-03-23 RX ADMIN — OXYBUTYNIN CHLORIDE 5 MG: 5 TABLET ORAL at 08:27

## 2022-03-23 RX ADMIN — ROSUVASTATIN 20 MG: 10 TABLET, FILM COATED ORAL at 21:04

## 2022-03-23 RX ADMIN — MIDODRINE HYDROCHLORIDE 5 MG: 5 TABLET ORAL at 17:06

## 2022-03-23 RX ADMIN — WARFARIN SODIUM 4 MG: 2 TABLET ORAL at 21:04

## 2022-03-23 RX ADMIN — ACETAMINOPHEN 650 MG: 325 TABLET ORAL at 21:04

## 2022-03-23 RX ADMIN — TAMSULOSIN HYDROCHLORIDE 0.4 MG: 0.4 CAPSULE ORAL at 11:31

## 2022-03-23 RX ADMIN — MIDODRINE HYDROCHLORIDE 5 MG: 5 TABLET ORAL at 08:27

## 2022-03-23 RX ADMIN — RISPERIDONE 1 MG: 1 TABLET, FILM COATED ORAL at 17:06

## 2022-03-23 RX ADMIN — HEPARIN SODIUM 3520 UNITS: 1000 INJECTION INTRAVENOUS; SUBCUTANEOUS at 12:40

## 2022-03-23 RX ADMIN — OXYBUTYNIN CHLORIDE 5 MG: 5 TABLET ORAL at 17:06

## 2022-03-23 RX ADMIN — SERTRALINE 25 MG: 50 TABLET, FILM COATED ORAL at 08:27

## 2022-03-23 RX ADMIN — MEMANTINE HYDROCHLORIDE 5 MG: 5 TABLET, FILM COATED ORAL at 08:27

## 2022-03-23 RX ADMIN — MEMANTINE HYDROCHLORIDE 5 MG: 5 TABLET, FILM COATED ORAL at 17:06

## 2022-03-23 RX ADMIN — HEPARIN SODIUM 12 UNITS/KG/HR: 1000 INJECTION, SOLUTION INTRAVENOUS; SUBCUTANEOUS at 12:43

## 2022-03-23 RX ADMIN — RISPERIDONE 1 MG: 1 TABLET, FILM COATED ORAL at 08:27

## 2022-03-23 NOTE — PROGRESS NOTES
Transition of Care  1. RUR 11% low   2. Disposition     SNF [facility] Accepted [facility] [facility]-Pending   3. Transportation  BLS  4. Follow Up PCP/Specialist     CM spoke with son-David [phone] adv patient acceptance to [facility]. Son says prefer patient to go to [facility]. CM called [facility] spoke with Angelita-admissions to follow up [phone]. No beds available today, adv call in a.m. 3/24 for bed availability. Need patient updated CXRAY, Behaviors, COVID Test before acceptance, Dr. Bennie Roe advised.     Miguel Youssef RN

## 2022-03-23 NOTE — ROUTINE PROCESS
Bedside and Verbal shift change report given to rolando Serrano (oncoming nurse) by Yovani Goldstein (offgoing nurse). Report included the following information SBAR, Kardex, Recent Results and Cardiac Rhythm SR/apaced.

## 2022-03-23 NOTE — PROGRESS NOTES
[facility] [facility] Adult  Hospitalist Group                                                                                          Hospitalist Progress Note  Angel Dsouza MD  Answering service: [phone] OR [phone] from in house phone        Date of Service:  3/23/2022  NAME:  Bridget Varma  :  1935  MRN:  [id number]      Admission Summary:   Copied form admit: \"    82 y.o. female presents with several days duration of abrupt onset, constant, gradually worsening, severe, nonradiating, sharp, bilateral hip pain is associated bilateral black eyes and happened after a fall. Patient denies exacerbating features  and denies remitting features.     Patient fell last week onto her nose, she is doing just fine, but woke up this morning she is not feeling well, could not walk. Incidental findings on imaging showed cervical stenosis, for which the emergency department consulted neurosurgery, who suggested that the patient be admitted and MRI be completed. \"    Interval history / Subjective:     3/23/2022 :   Less confused  MRI suggest recent cva  CVA w/u   Case discussed w Son Henry Krishnamurthy. Case discussed w RN  Neurology notes best to proceed w anticoagulation til surgury. Neurosurg weighed in w thoughts per Dr. Pascual Alpha note appreicated   Hosie Pali once stable to likely SNF .       Assessment & Plan:     Spinal stenosis in cervical region (3/16/2022)  3/17:   Stabilized w cervical collar til needs proven otherwise via MRI cervical neck   3/18:   Cont in cervical collar   MRI pending   Pt hypotensive through night and am 3/18/2022  - not seemingly vol responsive    ICU intensivist to eval   Septic w/u started  3/21: above resovled, treated w abx for uti ; else    Await MRI   Cont on cervcial colar, minor    Cardiology to clear for MRI   3/22:   Confusional last pm acting out w same   MRI completed , await final results   Katya Yoon   3/23:  · Less confused  · MRI suggest recent cva  · CVA w/u   · Case discussed w Son Malina Hale. · Case discussed w RN  · Neurology  notes best to proceed w anticoagulation til surgury. · Neurosurg weighed in w thoughts per Dr. Victoria Draft note appreicated   · Dispo once stable to likely SNF . Aortic lyla replacement status, off OAC x past 2 years on ASA per last notes and pt confirms same \"Coumadin was replaced with low dose ASA\"     UTI, rocephin, cult ( 3/19)     Hypotension resolved as an acute issue           Lyla heart dz w prosthetic valve; off [address] x past 2 years on ASA per last notes and pt confirms same \"Coumadin was replaced with low dose ASA\"   Now w CVA noted ? Consider ASA failure and needs for NOAC/OAC    Hyperlipidemia    Dementia        Hospital Problems  Date Reviewed: 3/17/2022          Codes Class Noted POA    Spinal stenosis in cervical region ICD-10-CM: M48.02  ICD-9-CM: 723.0  3/16/2022 Unknown                  After personally interviewing pt at bedside the following is noted on 3/23/2022 :    Review of Systems   Constitutional: Negative for chills, fever, malaise/fatigue and weight loss. Respiratory: Negative. Cardiovascular: Negative for chest pain. All other systems reviewed and are negative. I had a face to face encounter with patient on 3/23/2022 at bedside for the following physical exam:     PHYSICAL EXAM:    Visit Vitals  BP (!) 120/42 (BP 1 Location: Left upper arm, BP Patient Position: At rest)   Pulse 72 Comment: pod   Temp 97.9 °F (36.6 °C)   Resp 18   Ht 5' (1.524 m)   Wt 58.7 kg (129 lb 8 oz)   SpO2 95%   Breastfeeding No   BMI 25.29 kg/m²          Physical Exam  Constitutional:       General: She is not in acute distress. Appearance: She is not ill-appearing, toxic-appearing or diaphoretic. HENT:      Head: Normocephalic and atraumatic. Right Ear: External ear normal.      Left Ear: External ear normal.      Nose: Nose normal.      Mouth/Throat:      Mouth: Mucous membranes are dry.       Pharynx: Oropharynx is clear.   Eyes:      General:         Right eye: No discharge. Left eye: No discharge. Extraocular Movements: Extraocular movements intact. Conjunctiva/sclera: Conjunctivae normal.      Pupils: Pupils are equal, round, and reactive to light. Cardiovascular:      Rate and Rhythm: Normal rate and regular rhythm. Pulmonary:      Effort: Pulmonary effort is normal. No respiratory distress. Abdominal:      General: Abdomen is flat. There is no distension. Palpations: Abdomen is soft. Musculoskeletal:         General: No swelling or deformity. Cervical back: Normal range of motion and neck supple. Skin:     General: Skin is warm. Coloration: Skin is not jaundiced or pale. Neurological:      General: No focal deficit present. Mental Status: Mental status is at baseline. Psychiatric:         Mood and Affect: Mood normal.         Behavior: Behavior normal.      Comments:                       Data Review:    Review and/or order of clinical lab test      Labs:     Recent Labs     03/23/22  0115   WBC 8.4   HGB 9.3*   HCT 29.5*        Recent Labs     03/23/22  0115      K 4.6      CO2 26   BUN 30*   CREA 0.73      CA 8.5   MG 2.3     No results for input(s): ALT, AP, TBIL, TBILI, TP, ALB, GLOB, GGT, AML, LPSE in the last 72 hours. No lab exists for component: SGOT, GPT, AMYP, HLPSE  No results for input(s): INR, PTP, APTT, INREXT, INREXT in the last 72 hours. No results for input(s): FE, TIBC, PSAT, FERR in the last 72 hours. Lab Results   Component Value Date/Time    Folate 7.9 03/17/2022 06:23 AM      No results for input(s): PH, PCO2, PO2 in the last 72 hours. No results for input(s): CPK, CKNDX, TROIQ in the last 72 hours.     No lab exists for component: CPKMB  Lab Results   Component Value Date/Time    Cholesterol, total 230 (H) 03/17/2022 06:23 AM    HDL Cholesterol 56 03/17/2022 06:23 AM    LDL, calculated 153.8 (H) 03/17/2022 06:23 AM Triglyceride 101 03/17/2022 06:23 AM    CHOL/HDL Ratio 4.1 03/17/2022 06:23 AM     No results found for: [facility]  Lab Results   Component Value Date/Time    Color YELLOW/STRAW 03/19/2022 12:54 AM    Appearance CLEAR 03/19/2022 12:54 AM    Specific gravity 1.016 03/19/2022 12:54 AM    pH (UA) 5.5 03/19/2022 12:54 AM    Protein Negative 03/19/2022 12:54 AM    Glucose Negative 03/19/2022 12:54 AM    Ketone Negative 03/19/2022 12:54 AM    Bilirubin Negative 03/19/2022 12:54 AM    Urobilinogen 1.0 03/19/2022 12:54 AM    Nitrites Negative 03/19/2022 12:54 AM    Leukocyte Esterase LARGE (A) 03/19/2022 12:54 AM    Epithelial cells FEW 03/19/2022 12:54 AM    Bacteria Negative 03/19/2022 12:54 AM    WBC 20-50 03/19/2022 12:54 AM    RBC 0-5 03/19/2022 12:54 AM         Medications Reviewed:     Current Facility-Administered Medications   Medication Dose Route Frequency    tamsulosin (FLOMAX) capsule 0.4 mg  0.4 mg Oral DAILY    rosuvastatin (CRESTOR) tablet 20 mg  20 mg Oral QHS    midodrine (PROAMATINE) tablet 5 mg  5 mg Oral BID WITH MEALS    acetaminophen (TYLENOL) tablet 650 mg  650 mg Oral Q4H PRN    [Held by provider] lisinopriL (PRINIVIL, ZESTRIL) tablet 10 mg  10 mg Oral DAILY    risperiDONE (RisperDAL) tablet 1 mg  1 mg Oral BID    memantine (NAMENDA) tablet 5 mg  5 mg Oral BID    sertraline (ZOLOFT) tablet 25 mg  25 mg Oral DAILY    oxybutynin (DITROPAN) tablet 5 mg  5 mg Oral BID    HYDROcodone-acetaminophen (NORCO) 5-325 mg per tablet 1 Tablet  1 Tablet Oral Q6H PRN     ______________________________________________________________________  EXPECTED LENGTH OF STAY: 2d 21h  ACTUAL LENGTH OF STAY:          7                 Elmer Garcia MD

## 2022-03-23 NOTE — PROGRESS NOTES
Spiritual Care Partner Volunteer [facility] [address] [place] [address] on 3/23/2022   Documented by:  Sangeeta Ricks  [phone]

## 2022-03-23 NOTE — PROGRESS NOTES
Discussed MRI with patient's son, Claudette Westfall. He does not want surgical intervention. Plan will be cervical collar for 6 weeks. CT of Cervical spine (no contrast) in 6 weeks. If CT is stable we will consider transitioning her to a soft collar.   Okay to anti-coagulate

## 2022-03-23 NOTE — ROUTINE PROCESS
Ms Alicia Dee is here for spinal stenosis. Pt had 9 beats of v-tach. asymptomatic. Pt hasn't had lab since 3/18. Would you like to order a magnesium and potassium on pt?  Thank you Radha Francis

## 2022-03-23 NOTE — PROGRESS NOTES
Problem: Mobility Impaired (Adult and Pediatric)  Goal: *Acute Goals and Plan of Care (Insert Text)  Description: FUNCTIONAL STATUS PRIOR TO ADMISSION: Patient was independent and active without use of DME. Per chart review, pt with recent fall ~ 2 weeks ago which resulted in this admission. Pt with baseline dementia and questionable historian to provide prior level of function    HOME SUPPORT PRIOR TO ADMISSION: The patient lived alone with family that lived next door that checked on her daily    Physical Therapy Goals  Initiated 3/18/2022  1. Patient will move from supine to sit and sit to supine  and roll side to side in bed with moderate assistance  within 7 day(s). 2.  Patient will transfer from bed to chair and chair to bed with maximal assistance using the least restrictive device within 7 day(s). 3.  Patient will perform sit to stand with maximal assistance within 7 day(s). 4.  Patient will ambulate with maximal assistance for 5 feet with the least restrictive device within 7 day(s). Outcome: Progressing Towards Goal   PHYSICAL THERAPY TREATMENT  Patient: Kamala Valentino (82 y.o. female)  Date: 3/23/2022  Diagnosis: Spinal stenosis in cervical region [M48.02] <principal problem not specified>       Precautions: Fall (C-collar)  Chart, physical therapy assessment, plan of care and goals were reviewed. ASSESSMENT  Patient continues with skilled PT services and is progressing towards goals. Pt presents with generalized weakness, impaired balance, unsteady gait, and decreased activity tolerance. Note recent MRI came back positive for multiple small cerebral infarcts. Pt was A&O x4. Provided with education on cervical precautions (not turn her head) which she was able to verbally recall but not demonstrate. Pt required CGA for supine to sit and sit to stand. Pt transferred bed> chair with Yumiko and using RW. Session focused on practicing sitting while adhering to cervical precautions.  Pt stepped forward and backward with Yumiko and RW and provided with VC for sequencing (hand placement, feeling for the chair, looking forward). Pt demonstrated good carry over with sequencing. Recommending SNF upon discharge. Current Level of Function Impacting Discharge (mobility/balance): Yumiko bed >chair with RW     Other factors to consider for discharge: dementia, lives alone, independent baseline, fall risk, cervical cord compression, acute CVAs          PLAN :  Patient continues to benefit from skilled intervention to address the above impairments. Continue treatment per established plan of care. to address goals. Recommendation for discharge: (in order for the patient to meet his/her long term goals)  Therapy up to 5 days/week in SNF setting    This discharge recommendation:  Has not yet been discussed the attending provider and/or case management    IF patient discharges home will need the following DME: to be determined (TBD)       SUBJECTIVE:   Patient stated I can't drink my coffee with this thing on.    OBJECTIVE DATA SUMMARY:   Critical Behavior:  Neurologic State: Alert,Eyes open spontaneously,Other (Comment) (periodic confusion)  Orientation Level: Oriented X4,Other (Comment) (poc)  Cognition: Decreased attention/concentration,Memory loss,Follows commands  Safety/Judgement: Awareness of environment  Functional Mobility Training:  Bed Mobility:  Rolling: Minimum assistance  Supine to Sit: Contact guard assistance     Scooting: Contact guard assistance        Transfers:  Sit to Stand: Contact guard assistance           Bed to Chair: Minimum assistance                    Balance:  Sitting: Intact  Standing: Impaired; With support  Standing - Static: Fair;Constant support  Standing - Dynamic : Fair;Constant support  Ambulation/Gait Training:  Distance (ft): 3 Feet (ft) (x3 reps)  Assistive Device: Gait belt;Walker, rolling  Ambulation - Level of Assistance: Minimal assistance;Assist x1        Gait Abnormalities: Decreased step clearance; Step to gait;Trunk sway increased    Speed/Gabriella: Slow      Activity Tolerance:   Fair    After treatment patient left in no apparent distress:   Sitting in chair, Call bell within reach, and Bed / chair alarm activated    COMMUNICATION/COLLABORATION:   The patients plan of care was discussed with: Registered nurse. Occupational therapy. Ginette Jones, HAM   Time Calculation: 16 mins        Regarding student involvement in patient care:  A student participated in this treatment session. Per CMS Medicare statements and APTA guidelines I certify that the following was true:  1. I was present and directly observed the entire session. 2. I made all skilled judgments and clinical decisions regarding care. 3. I am the practitioner responsible for assessment, treatment, and documentation.

## 2022-03-23 NOTE — PROGRESS NOTES
Neurology Progress Note     NAME: Darci Torrez   :  1935   MRN:  [id number]   DATE:  3/23/2022    Assessment:     Active Problems:    Spinal stenosis in cervical region (3/16/2022)      Pt is an 81yo female with HTN, HLD, dementia, and h/o aortic valve replacement on Coumadin in past, but not currently, admitted 3/16/22 after a fall a week prior, and inability to ambulate on day of admission. CTH neg for acute finding, but CTCS with critical cervical stenosis at C3-C4 and C4-C5. She was seen by NSG who recommended MRI. Dr. Nando Colvin from neurology saw the pt on 3/17/22 and found diffuse extremity weakness, no reflexes or babinski testing noted. She recommended MRI brain. Pt has a pacemaker, so this delayed MRI until yesterday. Exam today with 5/5 , no PD, 4+/5 bilateral HF, left DF 4/5, no tremors, reflexes 2+ in UE, 3:knees, 2+ ankles, toes down going. MRI brain with multifocal acute bilateral punctate and small infarcts c/w embolic event  MRI C-spine with severe central stenosis with cord compression at C3-C4, mod stenosis at C4-C5. Plan:   Recommend restarting anticoagulation unless some contraindication  If NSG is going to recommend cervical surgery, it might be ideal to do this now while not anticoagulated. She is a week out from her strokes and they are all very small. Alternatively, she could be started on coumadin and then cover her with heparin prior to surgery while coumadin is being held. Please call neurology with any questions    Subjective:   Pt sitting in chair by bed in [place] collar. No complaints. Surprised to hear she had strokes on MRI. Pt adamantly denies falls, \"I only had one! \" Doesn't recall why coumadin was stopped, states, \"but I am on aspirin. \"    Objective:   Chart reviewed since last seen    Current Facility-Administered Medications   Medication Dose Route Frequency    tamsulosin (FLOMAX) capsule 0.4 mg  0.4 mg Oral DAILY    rosuvastatin (CRESTOR) tablet 20 mg  20 mg Oral QHS    midodrine (PROAMATINE) tablet 5 mg  5 mg Oral BID WITH MEALS    acetaminophen (TYLENOL) tablet 650 mg  650 mg Oral Q4H PRN    [Held by provider] lisinopriL (PRINIVIL, ZESTRIL) tablet 10 mg  10 mg Oral DAILY    risperiDONE (RisperDAL) tablet 1 mg  1 mg Oral BID    memantine (NAMENDA) tablet 5 mg  5 mg Oral BID    sertraline (ZOLOFT) tablet 25 mg  25 mg Oral DAILY    oxybutynin (DITROPAN) tablet 5 mg  5 mg Oral BID    HYDROcodone-acetaminophen (NORCO) 5-325 mg per tablet 1 Tablet  1 Tablet Oral Q6H PRN       Visit Vitals  BP (!) 120/42 (BP 1 Location: Left upper arm, BP Patient Position: At rest)   Pulse 72 Comment: pod   Temp 97.9 °F (36.6 °C)   Resp 18   Ht 5' (1.524 m)   Wt 129 lb 8 oz (58.7 kg)   SpO2 95%   Breastfeeding No   BMI 25.29 kg/m²     Temp (24hrs), Av.2 °F (36.8 °C), Min:97.3 °F (36.3 °C), Max:98.6 °F (37 °C)      701 - 1900  In: -   Out: 500 [Urine:500]  1901 -  0700  In: 360 [P.O.:360]  Out: 1125 [Urine:1125]      Physical Exam:  General: Well developed well nourished patient in no apparent distress. Cardiac: Regular rate and rhythm  Extremities: 2+ Radial pulses, no cyanosis or edema    Neurological Exam:  Mental Status: Oriented to time, place and person. Speech and language intact. Attention and fund of knowledge appropriate. Memory impaired to details of history. Cranial Nerves:   VFF, PERRL, EOMI, no nystagmus, no ptosis. Facial movement is symmetric. Palate is midline. Tongue is midline. Hearing is intact. Motor:  5/5 , no PD, 4+/5 bilateral HF, left DF 4/5, no tremors   Reflexes:   Deep tendon reflexes 2+ in UE, 3+ knees, 2+ ankles. Toes downgoing.    Sensory:      Gait:     Cerebellar:  Intact FTN and SHELBIE intact         Lab Review   Recent Results (from the past 24 hour(s))   CBC WITH AUTOMATED DIFF    Collection Time: 03/23/22  1:15 AM   Result Value Ref Range    WBC 8.4 3.6 - 11.0 K/uL    RBC 3.21 (L) 3.80 - 5.20 M/uL    HGB 9.3 (L) 11.5 - 16.0 g/dL    HCT 29.5 (L) 35.0 - 47.0 %    MCV 91.9 80.0 - 99.0 FL    MCH 29.0 26.0 - 34.0 PG    MCHC 31.5 30.0 - 36.5 g/dL    RDW 14.6 (H) 11.5 - 14.5 %    PLATELET 419 561 - 580 K/uL    NRBC 0.0 0  WBC    ABSOLUTE NRBC 0.00 0.00 - 0.01 K/uL    NEUTROPHILS 48 32 - 75 %    LYMPHOCYTES 24 12 - 49 %    MONOCYTES 10 5 - 13 %    EOSINOPHILS 16 (H) 0 - 7 %    BASOPHILS 1 0 - 1 %    IMMATURE GRANULOCYTES 1 (H) 0.0 - 0.5 %    ABS. NEUTROPHILS 4.1 1.8 - 8.0 K/UL    ABS. LYMPHOCYTES 2.0 0.8 - 3.5 K/UL    ABS. MONOCYTES 0.8 0.0 - 1.0 K/UL    ABS. EOSINOPHILS 1.3 (H) 0.0 - 0.4 K/UL    ABS. BASOPHILS 0.1 0.0 - 0.1 K/UL    ABS. IMM. GRANS. 0.1 (H) 0.00 - 0.04 K/UL    DF SMEAR SCANNED      RBC COMMENTS ANISOCYTOSIS  1+        RBC COMMENTS OVALOCYTES  PRESENT       METABOLIC PANEL, BASIC    Collection Time: 03/23/22  1:15 AM   Result Value Ref Range    Sodium 138 136 - 145 mmol/L    Potassium 4.6 3.5 - 5.1 mmol/L    Chloride 108 97 - 108 mmol/L    CO2 26 21 - 32 mmol/L    Anion gap 4 (L) 5 - 15 mmol/L    Glucose 100 65 - 100 mg/dL    BUN 30 (H) 6 - 20 MG/DL    Creatinine 0.73 0.55 - 1.02 MG/DL    BUN/Creatinine ratio 41 (H) 12 - 20      GFR est AA >60 >60 ml/min/1.73m2    GFR est non-AA >60 >60 ml/min/1.73m2    Calcium 8.5 8.5 - 10.1 MG/DL   MAGNESIUM    Collection Time: 03/23/22  1:15 AM   Result Value Ref Range    Magnesium 2.3 1.6 - 2.4 mg/dL       Additional comments:  I have reviewed the patient's new clinical lab test results. I have personally reviewed the patient's radiographs.   MRI   MRI Results (most recent):  Status: Final result (Exam End: 3/22/2022 11:53) Provider Status: Open       MRI BRAIN WO CONT: Patient Communication    Add Comments  Not seen       Study Result    Narrative & Impression   INDICATION:   falls, stroke?     EXAMINATION:  MRI BRAIN WO CONTRAST     COMPARISON:  CT March 16, 2022     TECHNIQUE:  Multiplanar multisequence acquisition without contrast of the brain.        FINDINGS:       Ventricles:  Midline, no hydrocephalus. Brain Parenchyma/Brainstem: Moderate to severe chronic T2 hyperintensity  throughout the supratentorial white matter and elle. Multiple small foci of  acute infarction, involving the left occipital lobe, right parietal lobe and  temporoparietal junction, anterior left parietal lobe, bilateral centrum  semiovale, right frontal lobe/lateral January the corpus callosum, lateral right  frontal lobe. Intracranial Hemorrhage:  Multiple small foci of hemosiderin deposition in the  supratentorial brain, as well as in the cerebellum in a nonspecific  distribution. Basal Cisterns:  Normal.   Flow Voids:  Normal.  Additional Comments:  N/A.     IMPRESSION  Extensive chronic white matter disease with numerous small foci of acute  infarction covering multiple vascular territories, which may be due to a central  embolic process. Numerous chronic microhemorrhages, nonspecific distribution. Results from [place] encounter on 03/16/22    MRI CERV SPINE WO CONT    Narrative  EXAM: MRI CERV SPINE WO CONT    INDICATION: falls, severe c/s stenosis. COMPARISON: None    TECHNIQUE: MR imaging of the cervical spine was performed using the following  sequences: sagittal T1, T2, STIR;  axial T2, T1.    CONTRAST:  None. FINDINGS:    Alignment is remarkable for a 1.4 mm anterolisthesis at C2-3, 4.1 mm  retrolisthesis at C3-4 and 1.7 mm retrolisthesis at C4-5. Vertebral body height  is mildly diminished at C3 and C4, likely on a degenerative basis, with severe  disc space loss at C3-4 where there is endplate sclerosis and spondylosis. Marrow signal intensity shows degenerative change, most significant at C3-4. The craniocervical junction is intact.  The course, caliber, and signal intensity  of the cervical medullary junction is normal. Thereafter, however, there is  severe extrinsic compression of the spinal cord at C3-4 with T2 and STIR  hyperintensity within the substance of the cord at that level, for a  cephalocaudad distance of 1.3 cm. .    The paraspinal soft tissues are within normal limits. C2-C3: No herniation or stenosis. Mild bilateral foraminal stenosis. Grade 1  anterolisthesis. C3-C4: Severe central stenosis, with severe cord compression and flattening. STIR hyperintensity of in the substance of the cord consistent with acute and/or  chronic ischemia. Moderate to severe bilateral foraminal stenosis and facet  arthropathy. Grade 1 retrolisthesis with spondylosis. C4-C5: Grade 1 retrolisthesis. Moderate central stenosis with cord flattening,  broad and left posterolateral spondylosis. Moderate left and mild to moderate  right foraminal stenosis. C5-C6: No herniation or stenosis. Mild left foraminal stenosis. C6-C7: No herniation or stenosis. C7-T1: No herniation or stenosis. Impression  1. C2-3: Grade 1 anterolisthesis, mild bilateral foraminal stenosis. 2. C3-4: Grade 1 retrolisthesis, severe associated central stenosis and cord  compression with central cord ischemic change suggested, age undetermined. Moderate to severe bilateral foraminal stenosis. 3. C4-5: Grade 1 retrolisthesis, moderate central stenosis, broad and left  posterolateral spondylosis. Bilateral foraminal stenosis, left greater than  right. 4. C5-6: Mild left foraminal stenosis. CT Results (most recent):        Care Plan discussed with:  Patient x   Family    RN    Care Manager    Consultant/Specialist:  x     Signed: Krissy Gibson MD

## 2022-03-23 NOTE — PROGRESS NOTES
1842: Spoke with hospitalist regarding family's decision to not proceed with surgical intervention. Patient to transition to NOAC. Heparin gtt discontinued. 1854: Bladder scan reads 513mL. Patient does not have urge to urinate. Hospitalist notified. Order to straight cath now and to re-insert rivero if needed to straight cath x2.    1938: Performed straight cath x1 per MD order. 450mL output. Bladder scan post straight cath showed ~82mL. Problem: Falls - Risk of  Goal: *Absence of Falls  Description: Document [place] Fall Risk and appropriate interventions in the flowsheet.   Outcome: Progressing Towards Goal  Note: Fall Risk Interventions:  Mobility Interventions: Communicate number of staff needed for ambulation/transfer,OT consult for ADLs,Patient to call before getting OOB,Bed/chair exit alarm,PT Consult for mobility concerns,PT Consult for assist device competence,Strengthening exercises (ROM-active/passive)    Mentation Interventions: Bed/chair exit alarm,Increase mobility,More frequent rounding,Reorient patient,Update white board    Medication Interventions: Bed/chair exit alarm,Patient to call before getting OOB,Teach patient to arise slowly    Elimination Interventions: Call light in reach,Patient to call for help with toileting needs,Toileting schedule/hourly rounds,Stay With Me (per policy)    History of Falls Interventions: Bed/chair exit alarm,Consult care management for discharge planning,Door open when patient unattended,Investigate reason for fall,Room close to nurse's station,Vital signs minimum Q4HRs X 24 hrs (comment for end date)

## 2022-03-24 PROBLEM — M48.02 SPINAL STENOSIS IN CERVICAL REGION: Status: ACTIVE | Noted: 2022-03-16

## 2022-03-24 LAB
ANION GAP SERPL CALC-SCNC: 4 MMOL/L (ref 5–15)
BASOPHILS # BLD: 0.1 K/UL (ref 0–0.1)
BASOPHILS NFR BLD: 1 % (ref 0–1)
BUN SERPL-MCNC: 24 MG/DL (ref 6–20)
BUN/CREAT SERPL: 39 (ref 12–20)
CALCIUM SERPL-MCNC: 8.2 MG/DL (ref 8.5–10.1)
CHLORIDE SERPL-SCNC: 108 MMOL/L (ref 97–108)
CO2 SERPL-SCNC: 25 MMOL/L (ref 21–32)
CREAT SERPL-MCNC: 0.61 MG/DL (ref 0.55–1.02)
DIFFERENTIAL METHOD BLD: ABNORMAL
EOSINOPHIL # BLD: 1.1 K/UL (ref 0–0.4)
EOSINOPHIL NFR BLD: 12 % (ref 0–7)
ERYTHROCYTE [DISTWIDTH] IN BLOOD BY AUTOMATED COUNT: 14.3 % (ref 11.5–14.5)
GLUCOSE SERPL-MCNC: 110 MG/DL (ref 65–100)
HCT VFR BLD AUTO: 26.3 % (ref 35–47)
HGB BLD-MCNC: 8.5 G/DL (ref 11.5–16)
IMM GRANULOCYTES # BLD AUTO: 0.1 K/UL (ref 0–0.04)
IMM GRANULOCYTES NFR BLD AUTO: 1 % (ref 0–0.5)
INR PPP: 1.2 (ref 0.9–1.1)
LYMPHOCYTES # BLD: 1.8 K/UL (ref 0.8–3.5)
LYMPHOCYTES NFR BLD: 19 % (ref 12–49)
MCH RBC QN AUTO: 28.9 PG (ref 26–34)
MCHC RBC AUTO-ENTMCNC: 32.3 G/DL (ref 30–36.5)
MCV RBC AUTO: 89.5 FL (ref 80–99)
MONOCYTES # BLD: 1 K/UL (ref 0–1)
MONOCYTES NFR BLD: 10 % (ref 5–13)
NEUTS SEG # BLD: 5.4 K/UL (ref 1.8–8)
NEUTS SEG NFR BLD: 57 % (ref 32–75)
NRBC # BLD: 0 K/UL (ref 0–0.01)
NRBC BLD-RTO: 0 PER 100 WBC
PLATELET # BLD AUTO: 302 K/UL (ref 150–400)
PLATELET COMMENTS,PCOM: ABNORMAL
PMV BLD AUTO: 9.6 FL (ref 8.9–12.9)
POTASSIUM SERPL-SCNC: 4.2 MMOL/L (ref 3.5–5.1)
PROTHROMBIN TIME: 12 SEC (ref 9–11.1)
RBC # BLD AUTO: 2.94 M/UL (ref 3.8–5.2)
RBC MORPH BLD: ABNORMAL
SODIUM SERPL-SCNC: 137 MMOL/L (ref 136–145)
WBC # BLD AUTO: 9.5 K/UL (ref 3.6–11)

## 2022-03-24 PROCEDURE — 97535 SELF CARE MNGMENT TRAINING: CPT

## 2022-03-24 PROCEDURE — 80048 BASIC METABOLIC PNL TOTAL CA: CPT

## 2022-03-24 PROCEDURE — 51798 US URINE CAPACITY MEASURE: CPT

## 2022-03-24 PROCEDURE — 74011250637 HC RX REV CODE- 250/637: Performed by: STUDENT IN AN ORGANIZED HEALTH CARE EDUCATION/TRAINING PROGRAM

## 2022-03-24 PROCEDURE — 74011250637 HC RX REV CODE- 250/637: Performed by: INTERNAL MEDICINE

## 2022-03-24 PROCEDURE — 74011250637 HC RX REV CODE- 250/637: Performed by: FAMILY MEDICINE

## 2022-03-24 PROCEDURE — 85025 COMPLETE CBC W/AUTO DIFF WBC: CPT

## 2022-03-24 PROCEDURE — 85610 PROTHROMBIN TIME: CPT

## 2022-03-24 PROCEDURE — 65660000000 HC RM CCU STEPDOWN

## 2022-03-24 PROCEDURE — 36415 COLL VENOUS BLD VENIPUNCTURE: CPT

## 2022-03-24 RX ADMIN — RISPERIDONE 1 MG: 1 TABLET, FILM COATED ORAL at 17:12

## 2022-03-24 RX ADMIN — MEMANTINE HYDROCHLORIDE 5 MG: 5 TABLET, FILM COATED ORAL at 17:12

## 2022-03-24 RX ADMIN — MIDODRINE HYDROCHLORIDE 5 MG: 5 TABLET ORAL at 16:29

## 2022-03-24 RX ADMIN — ACETAMINOPHEN 650 MG: 325 TABLET ORAL at 13:49

## 2022-03-24 RX ADMIN — HYDROCODONE BITARTRATE AND ACETAMINOPHEN 1 TABLET: 5; 325 TABLET ORAL at 04:28

## 2022-03-24 RX ADMIN — SERTRALINE 25 MG: 50 TABLET, FILM COATED ORAL at 08:36

## 2022-03-24 RX ADMIN — WARFARIN SODIUM 2.5 MG: 2 TABLET ORAL at 16:29

## 2022-03-24 RX ADMIN — ROSUVASTATIN 20 MG: 10 TABLET, FILM COATED ORAL at 21:35

## 2022-03-24 RX ADMIN — RISPERIDONE 1 MG: 1 TABLET, FILM COATED ORAL at 08:36

## 2022-03-24 RX ADMIN — TAMSULOSIN HYDROCHLORIDE 0.4 MG: 0.4 CAPSULE ORAL at 08:36

## 2022-03-24 RX ADMIN — MIDODRINE HYDROCHLORIDE 5 MG: 5 TABLET ORAL at 08:36

## 2022-03-24 RX ADMIN — MEMANTINE HYDROCHLORIDE 5 MG: 5 TABLET, FILM COATED ORAL at 08:36

## 2022-03-24 NOTE — PROGRESS NOTES
Pharmacist Note - Warfarin Dosing  Consult provided for this 82 y. o.female to manage warfarin for Tissue Heart Valve    INR Goal: 2 - 3    Recent Labs     03/23/22  1219 03/23/22  0115   HGB 9.4* 9.3*     Date               INR                  Dose  3/20              1.1        3/23                                         4 mg                                                                                Assessment/ Plan: Will order warfarin 4 mg PO x 1 dose. Pharmacy will continue to monitor daily and adjust therapy as indicated. Please contact the pharmacist at x [phone] for outpatient recommendations if needed.

## 2022-03-24 NOTE — PROGRESS NOTES
Pharmacist Note - Warfarin Dosing  Consult provided for this 82 y. o.female to manage warfarin for h/o aortic valve replacement, with new multifocal acute bilateral infarcts, consistent w/ embolic event. Restarting anticoagulation (was not on PTA). INR Goal: 2 - 3    Drugs that may increase INR: None  Drugs that may decrease INR: None  Other current anticoagulants/ drugs that may increase bleeding risk: None  Risk factors: Age > 65  Daily INR ordered: YES    Recent Labs     03/24/22  0214 03/23/22  1219 03/23/22  0115   HGB 8.5* 9.4* 9.3*   INR 1.2*  --   --      Date               INR                  Dose  3/20              1.1      --  3/23                                       4 mg (given @ 2104)  3.24   1.2     2.5 mg                                                                              Assessment/ Plan: Will order warfarin 2.5 mg PO x 1 dose. Pharmacy will continue to monitor daily and adjust therapy as indicated. Please contact the pharmacist at x [phone] for outpatient recommendations if needed.

## 2022-03-24 NOTE — PROGRESS NOTES
Problem: Self Care Deficits Care Plan (Adult)  Goal: *Acute Goals and Plan of Care (Insert Text)  Description: FUNCTIONAL STATUS PRIOR TO ADMISSION: Patient was independent with ADLs and functional mobility, ambulatory with RW. Endorses 1 recent GLF. Note patient has dementia. HOME SUPPORT: Patient lived alone and reports her son, who lives next door, visited daily. Occupational Therapy Goals  Initiated 3/18/2022, Reviewed/updated upon weekly re-assessment 3/24/2022  1. Patient will perform self-feeding with supervision/set-up within 7 day(s). - Continue  2. Patient will perform grooming with supervision/set-up within 7 day(s). - Continue  3. Patient will perform upper body dressing with minimal assistance within 7 day(s). - MET 3/24/2022, upgrade to set-up/supervision  4. Patient will perform toilet transfers with moderate assistance  within 7 day(s). - Continue  5. Patient will perform all aspects of toileting with moderate assistance  within 7 day(s). - Continue  6. Patient will utilize fall prevention techniques during functional activities with verbal cues within 7 day(s). - Continue    Outcome: Progressing Towards Goal   OCCUPATIONAL THERAPY TREATMENT  Patient: Reynaldo Ahn (82 y.o. female)  Date: 3/24/2022  Diagnosis: Spinal stenosis in cervical region [M48.02] <principal problem not specified>       Precautions: Fall (C-collar)  Chart, occupational therapy assessment, plan of care, and goals were reviewed. ASSESSMENT  Patient continues with skilled OT services and is progressing towards goals. Pt's performance of ADL/IADL tasks continues to be limited at this time by impaired balance, poor safety awareness, impaired attention to task, generalized weakness, and decreased activity tolerance/endurance. Pt received supine, A&Ox4 and agreeable to participation in therapy session. Pt demonstrated bed mobility and functional transfers with CGA to min A required for balance this session.  While seated EOB, pt competed anterior upper and lower body bathing with SBA-CGA. During upper body dressing, pt with difficulty initiating threading LUE into sleeve, requiring verbal and visual cues. Pt transferred to bedside chair and left with all needs met. Continue to recommend SNF at d/c. Current Level of Function Impacting Discharge (ADLs): min A seated UB dressing, SBA to CGA seated anterior bathing     Other factors to consider for discharge: PLOF         PLAN :  Patient continues to benefit from skilled intervention to address the above impairments. Continue treatment per established plan of care to address goals. Recommendation for discharge: (in order for the patient to meet his/her long term goals)  Therapy up to 5 days/week in SNF setting    This discharge recommendation:  Has been made in collaboration with the attending provider and/or case management    IF patient discharges home will need the following DME: TBD       SUBJECTIVE:   Patient stated I'm getting tired.     OBJECTIVE DATA SUMMARY:   Cognitive/Behavioral Status:  Neurologic State: Alert  Orientation Level: Oriented X4  Cognition: Decreased attention/concentration (decreased safety awareness)             Functional Mobility and Transfers for ADLs:  Bed Mobility:  Supine to Sit: Contact guard assistance  Sit to Supine:  (pt remained in bedside chair)  Scooting: Contact guard assistance    Transfers:  Sit to Stand: Contact guard assistance     Bed to Chair: Contact guard assistance;Minimum assistance    Balance:  Sitting: Intact  Sitting - Static: Good (unsupported)  Sitting - Dynamic: Good (unsupported); Fair (occasional)  Standing: Impaired; With support  Standing - Static: Fair;Constant support  Standing - Dynamic : Fair;Constant support    ADL Intervention:  Feeding  Feeding Assistance: Set-up  Container Management: Set-up  Cutting Food: Set-up  Food to Mouth: Modified independent  Drink to Mouth: Modified independent         Upper Body Bathing  Bathing Assistance: Stand-by assistance  Position Performed: Seated edge of bed  Cues: Verbal cues provided    Lower Body Bathing  Lower Body : Contact guard assistance  Position Performed: Seated edge of bed  Cues: Verbal cues provided    Upper Body [address]: Minimum  assistance (assist to thread LUE into sleeve & line mgt)                     Pain:  None reported     Activity Tolerance:   Fair and requires rest breaks    After treatment patient left in no apparent distress:   Sitting in chair, Call bell within reach and Bed / chair alarm activated, RN notified     COMMUNICATION/COLLABORATION:   The patients plan of care was discussed with: Registered nurse.      Carlo Alatorre OT  Time Calculation: 29 mins

## 2022-03-24 NOTE — PROGRESS NOTES
Transition of Care Plan: SNF   Referrals: [facility]-pending  [facility]-accepted     RUR: 10% low     PCP F/U: Dr. Yuliya Rudolph     Disposition: SNF     Transportation: BLS     Main Contact: son-David PDTT [phone]     1244: [facility] accepted, but son still prefers [facility] in [place]. Clinicals have been faxed to Mar[facility] at [facility] at [phone]. MD states he needs still see patient before deciding if he is going to discharge.  Will continue to follow.      Tyler Gupta RN, CRM

## 2022-03-24 NOTE — PROGRESS NOTES
Problem: Falls - Risk of  Goal: *Absence of Falls  Description: Document Maryuri Embs Fall Risk and appropriate interventions in the flowsheet.   Outcome: Progressing Towards Goal  Note: Fall Risk Interventions:  Mobility Interventions: Bed/chair exit alarm,Communicate number of staff needed for ambulation/transfer,Patient to call before getting OOB,Strengthening exercises (ROM-active/passive),Utilize walker, cane, or other assistive device    Mentation Interventions: Bed/chair exit alarm,Increase mobility,More frequent rounding,Reorient patient,Toileting rounds,Update white board    Medication Interventions: Bed/chair exit alarm,Evaluate medications/consider consulting pharmacy,Patient to call before getting OOB,Teach patient to arise slowly    Elimination Interventions: Bed/chair exit alarm,Call light in reach,Patient to call for help with toileting needs,Toileting schedule/hourly rounds    History of Falls Interventions: Bed/chair exit alarm,Door open when patient unattended,Investigate reason for fall,Room close to nurse's station

## 2022-03-25 ENCOUNTER — APPOINTMENT (OUTPATIENT)
Dept: NON INVASIVE DIAGNOSTICS | Age: 87
DRG: 552 | End: 2022-03-25
Attending: INTERNAL MEDICINE
Payer: MEDICARE

## 2022-03-25 VITALS
SYSTOLIC BLOOD PRESSURE: 129 MMHG | OXYGEN SATURATION: 96 % | DIASTOLIC BLOOD PRESSURE: 70 MMHG | HEART RATE: 72 BPM | HEIGHT: 60 IN | BODY MASS INDEX: 23.16 KG/M2 | WEIGHT: 118 LBS | RESPIRATION RATE: 16 BRPM | TEMPERATURE: 97.6 F

## 2022-03-25 LAB
ECHO AO ROOT DIAM: 2.9 CM
ECHO AO ROOT INDEX: 1.95 CM/M2
ECHO AV AREA PEAK VELOCITY: 0.9 CM2
ECHO AV AREA VTI: 1 CM2
ECHO AV AREA/BSA PEAK VELOCITY: 0.6 CM2/M2
ECHO AV AREA/BSA VTI: 0.7 CM2/M2
ECHO AV MEAN GRADIENT: 15 MMHG
ECHO AV MEAN VELOCITY: 1.8 M/S
ECHO AV PEAK GRADIENT: 28 MMHG
ECHO AV PEAK VELOCITY: 2.6 M/S
ECHO AV VELOCITY RATIO: 0.5
ECHO AV VTI: 43.7 CM
ECHO EST RA PRESSURE: 3 MMHG
ECHO LA DIAMETER INDEX: 3.56 CM/M2
ECHO LA DIAMETER: 5.3 CM
ECHO LA TO AORTIC ROOT RATIO: 1.83
ECHO LV E' LATERAL VELOCITY: 6 CM/S
ECHO LV E' SEPTAL VELOCITY: 2 CM/S
ECHO LV FRACTIONAL SHORTENING: 33 % (ref 28–44)
ECHO LV INTERNAL DIMENSION DIASTOLE INDEX: 2.62 CM/M2
ECHO LV INTERNAL DIMENSION DIASTOLIC: 3.9 CM (ref 3.9–5.3)
ECHO LV INTERNAL DIMENSION SYSTOLIC INDEX: 1.74 CM/M2
ECHO LV INTERNAL DIMENSION SYSTOLIC: 2.6 CM
ECHO LV IVSD: 1.2 CM (ref 0.6–0.9)
ECHO LV MASS 2D: 169.4 G (ref 67–162)
ECHO LV MASS INDEX 2D: 113.7 G/M2 (ref 43–95)
ECHO LV POSTERIOR WALL DIASTOLIC: 1.3 CM (ref 0.6–0.9)
ECHO LV RELATIVE WALL THICKNESS RATIO: 0.67
ECHO LVOT AREA: 1.8 CM2
ECHO LVOT AV VTI INDEX: 0.58
ECHO LVOT DIAM: 1.5 CM
ECHO LVOT MEAN GRADIENT: 5 MMHG
ECHO LVOT PEAK GRADIENT: 7 MMHG
ECHO LVOT PEAK VELOCITY: 1.3 M/S
ECHO LVOT STROKE VOLUME INDEX: 30.1 ML/M2
ECHO LVOT SV: 44.9 ML
ECHO LVOT VTI: 25.4 CM
ECHO MV A VELOCITY: 1.22 M/S
ECHO MV AREA VTI: 1.1 CM2
ECHO MV E DECELERATION TIME (DT): 276.5 MS
ECHO MV E VELOCITY: 1.25 M/S
ECHO MV E/A RATIO: 1.02
ECHO MV E/E' LATERAL: 20.83
ECHO MV E/E' RATIO (AVERAGED): 41.67
ECHO MV E/E' SEPTAL: 62.5
ECHO MV LVOT VTI INDEX: 1.66
ECHO MV MAX VELOCITY: 1.4 M/S
ECHO MV MEAN GRADIENT: 3 MMHG
ECHO MV MEAN VELOCITY: 0.8 M/S
ECHO MV PEAK GRADIENT: 8 MMHG
ECHO MV PRESSURE HALF TIME (PHT): 100 MS
ECHO MV PRESSURE HALF TIME (PHT): 80.2 MS
ECHO MV VTI: 42.1 CM
ECHO PULMONARY ARTERY SYSTOLIC PRESSURE (PASP): 45 MMHG
ECHO PV MAX VELOCITY: 1.1 M/S
ECHO PV PEAK GRADIENT: 5 MMHG
ECHO RIGHT VENTRICULAR SYSTOLIC PRESSURE (RVSP): 44 MMHG
ECHO RV FREE WALL PEAK S': 3 CM/S
ECHO RV INTERNAL DIMENSION: 4.3 CM
ECHO RV TAPSE: 0.9 CM (ref 1.5–2)
ECHO TV REGURGITANT MAX VELOCITY: 3.2 M/S
ECHO TV REGURGITANT PEAK GRADIENT: 41 MMHG
INR PPP: 1.3 (ref 0.9–1.1)
PROTHROMBIN TIME: 13.1 SEC (ref 9–11.1)

## 2022-03-25 PROCEDURE — 93306 TTE W/DOPPLER COMPLETE: CPT

## 2022-03-25 PROCEDURE — 74011250636 HC RX REV CODE- 250/636: Performed by: NURSE PRACTITIONER

## 2022-03-25 PROCEDURE — 74011250637 HC RX REV CODE- 250/637: Performed by: INTERNAL MEDICINE

## 2022-03-25 PROCEDURE — 93306 TTE W/DOPPLER COMPLETE: CPT | Performed by: SPECIALIST

## 2022-03-25 PROCEDURE — 85610 PROTHROMBIN TIME: CPT

## 2022-03-25 PROCEDURE — 97535 SELF CARE MNGMENT TRAINING: CPT

## 2022-03-25 PROCEDURE — 74011250637 HC RX REV CODE- 250/637: Performed by: STUDENT IN AN ORGANIZED HEALTH CARE EDUCATION/TRAINING PROGRAM

## 2022-03-25 PROCEDURE — 36415 COLL VENOUS BLD VENIPUNCTURE: CPT

## 2022-03-25 RX ORDER — HALOPERIDOL 5 MG/ML
2 INJECTION INTRAMUSCULAR
Status: DISCONTINUED | OUTPATIENT
Start: 2022-03-25 | End: 2022-03-25 | Stop reason: HOSPADM

## 2022-03-25 RX ORDER — ACETAMINOPHEN 325 MG/1
650 TABLET ORAL
Qty: 30 TABLET | Refills: 0 | Status: SHIPPED
Start: 2022-03-25

## 2022-03-25 RX ORDER — ROSUVASTATIN CALCIUM 20 MG/1
20 TABLET, COATED ORAL
Qty: 30 TABLET | Refills: 2 | Status: SHIPPED | OUTPATIENT
Start: 2022-03-25 | End: 2022-05-20

## 2022-03-25 RX ORDER — MIDODRINE HYDROCHLORIDE 5 MG/1
5 TABLET ORAL 2 TIMES DAILY WITH MEALS
Qty: 60 TABLET | Refills: 1 | Status: SHIPPED | OUTPATIENT
Start: 2022-03-25 | End: 2022-04-24

## 2022-03-25 RX ORDER — TAMSULOSIN HYDROCHLORIDE 0.4 MG/1
0.4 CAPSULE ORAL DAILY
Qty: 30 CAPSULE | Refills: 2 | Status: SHIPPED | OUTPATIENT
Start: 2022-03-26

## 2022-03-25 RX ORDER — WARFARIN SODIUM 5 MG/1
TABLET ORAL
Qty: 30 TABLET | Refills: 1 | Status: SHIPPED | OUTPATIENT
Start: 2022-03-25 | End: 2022-05-24

## 2022-03-25 RX ADMIN — HALOPERIDOL LACTATE 2 MG: 5 INJECTION, SOLUTION INTRAMUSCULAR at 07:00

## 2022-03-25 RX ADMIN — HYDROCODONE BITARTRATE AND ACETAMINOPHEN 1 TABLET: 5; 325 TABLET ORAL at 00:25

## 2022-03-25 RX ADMIN — SERTRALINE 25 MG: 50 TABLET, FILM COATED ORAL at 09:32

## 2022-03-25 RX ADMIN — MEMANTINE HYDROCHLORIDE 5 MG: 5 TABLET, FILM COATED ORAL at 09:32

## 2022-03-25 RX ADMIN — TAMSULOSIN HYDROCHLORIDE 0.4 MG: 0.4 CAPSULE ORAL at 09:33

## 2022-03-25 RX ADMIN — HALOPERIDOL LACTATE 2 MG: 5 INJECTION, SOLUTION INTRAMUSCULAR at 01:21

## 2022-03-25 RX ADMIN — MIDODRINE HYDROCHLORIDE 5 MG: 5 TABLET ORAL at 09:32

## 2022-03-25 RX ADMIN — RISPERIDONE 1 MG: 1 TABLET, FILM COATED ORAL at 09:32

## 2022-03-25 NOTE — PROGRESS NOTES
Spiritual Care Assessment/Progress Note  [facility]      NAME: Rodrick Mary      MRN: [id number]  AGE: 82 y.o. SEX: female  Zoroastrianism Affiliation: Unknown   Language: English     3/25/2022     Total Time (in minutes): 10     Spiritual Assessment begun in [address] through conversation with:         []Patient        [] Family    [] Friend(s)        Reason for Consult: Initial/Spiritual assessment, patient floor     Spiritual beliefs: (Please include comment if needed)     [] Identifies with a edith tradition:         [] Supported by a edith community:            [] Claims no spiritual orientation:           [] Seeking spiritual identity:                [] Adheres to an individual form of spirituality:           [x] Not able to assess:                           Identified resources for coping:      [] Prayer                               [] Music                  [] Guided Imagery     [] Family/friends                 [] Pet visits     [] Devotional reading                         [x] Unknown     [] Other:                                               Interventions offered during this visit: (See comments for more details)    Patient Interventions: Spiritual care volunteer support           Plan of Care:     [] Support spiritual and/or cultural needs    [] Support AMD and/or advance care planning process      [] Support grieving process   [] Coordinate Rites and/or Rituals    [] Coordination with community clergy   [] No spiritual needs identified at this time   [] Detailed Plan of Care below (See Comments)  [] Make referral to Music Therapy  [] Make referral to Pet Therapy     [] Make referral to Addiction services  [] Make referral to [facility]  [] Make referral to Spiritual Care Partner  [] No future visits requested        [x] Contact Spiritual Care for further referrals     Comments:  visit for initial spiritual assessment.   Staff with patient providing care at the time of this visit. Please contact spiritual care for further referral or consult. Rev.  Brandon Perez MDiv, [facility], [facility]   paging service: [phone])

## 2022-03-25 NOTE — PROGRESS NOTES
Problem: Self Care Deficits Care Plan (Adult)  Goal: *Acute Goals and Plan of Care (Insert Text)  Description: FUNCTIONAL STATUS PRIOR TO ADMISSION: Patient was independent with ADLs and functional mobility, ambulatory with RW. Endorses 1 recent GLF. Note patient has dementia. HOME SUPPORT: Patient lived alone and reports her son, who lives next door, visited daily. Occupational Therapy Goals  Initiated 3/18/2022, Reviewed/updated upon weekly re-assessment 3/24/2022  1. Patient will perform self-feeding with supervision/set-up within 7 day(s). - Continue  2. Patient will perform grooming with supervision/set-up within 7 day(s). - Continue  3. Patient will perform upper body dressing with minimal assistance within 7 day(s). - MET 3/24/2022, upgrade to set-up/supervision  4. Patient will perform toilet transfers with moderate assistance  within 7 day(s). - Continue  5. Patient will perform all aspects of toileting with moderate assistance  within 7 day(s). - Continue  6. Patient will utilize fall prevention techniques during functional activities with verbal cues within 7 day(s). - Continue    Outcome: Progressing Towards Goal   OCCUPATIONAL THERAPY TREATMENT  Patient: Sarmad Sargent (82 y.o. female)  Date: 3/25/2022  Diagnosis: Spinal stenosis in cervical region [M48.02] <principal problem not specified>       Precautions: Fall (C-collar)  Chart, occupational therapy assessment, plan of care, and goals were reviewed. ASSESSMENT  Patient continues with skilled OT services and is progressing towards goals. Pt's performance of ADL/IADL tasks continues to be limited at this time by impaired balance, impaired cognition (A&Ox1), safety awareness, generalized weakness, and impaired activity tolerance/endurance. Pt received supine and agreeable to participation in therapy session.  She performed bed mobility/functional transfers with CGA to min A (bed to chair) with verbal/tactile cues provided for safe use of RW. Initial plan of ambulating to sink aborted as pt with quick fatigue requiring seated rest break. VSS. Pt remained in bedside chair set-up with breakfast. Continue to recommend SNF at d/c. Current Level of Function Impacting Discharge (ADLs): up to min A     Other factors to consider for discharge: PLOF, poor safety awareness          PLAN :  Patient continues to benefit from skilled intervention to address the above impairments. Continue treatment per established plan of care to address goals. Recommendation for discharge: (in order for the patient to meet his/her long term goals)  Therapy up to 5 days/week in SNF setting    This discharge recommendation:  Has been made in collaboration with the attending provider and/or case management    IF patient discharges home will need the following DME: TBD       SUBJECTIVE:   Patient stated I don't know why I'm here.     OBJECTIVE DATA SUMMARY:   Cognitive/Behavioral Status:  Neurologic State: Alert  Orientation Level: Oriented to person;Disoriented to time;Disoriented to situation;Disoriented to place  Cognition: Follows commands;Decreased attention/concentration;Poor safety awareness             Functional Mobility and Transfers for ADLs:  Bed Mobility:  Rolling: Minimum assistance  Supine to Sit: Contact guard assistance  Sit to Supine:  (pt remained in bedside chair)  Scooting: Contact guard assistance    Transfers:  Sit to Stand: Contact guard assistance     Bed to Chair: Contact guard assistance;Minimum assistance    Balance:  Sitting: Intact  Sitting - Static: Good (unsupported)  Sitting - Dynamic: Good (unsupported)  Standing: Impaired; With support  Standing - Static: Fair;Constant support  Standing - Dynamic : Fair;Constant support    ADL Intervention:  Feeding  Feeding Assistance: Set-up  Container Management: Set-up  Cutting Food: Set-up  Food to Mouth: Modified independent  Drink to Mouth: Modified independent  Cues: Verbal cues provided Upper Body [address]: Minimum  assistance              Cognitive Retraining  Orientation Retraining: Awareness of environment;Place; Reorienting;Situation;Time  Attention to Task: Single task  Following Commands: Follows one step commands/directions  Cues: Tactile cues provided;Verbal cues provided;Visual cues provided      Pain:  None reported     Activity Tolerance:   Fair, requires rest breaks    After treatment patient left in no apparent distress:   Sitting in chair, Call bell within reach and Bed / chair alarm activated, RN notified     COMMUNICATION/COLLABORATION:   The patients plan of care was discussed with: Registered nurse.      Kimber Barboza OT  Time Calculation: 22 mins

## 2022-03-25 NOTE — PROGRESS NOTES
Pharmacist Note - Warfarin Dosing  Consult provided for this 82 y. o.female to manage warfarin for h/o aortic valve replacement, with new multifocal acute bilateral infarcts, consistent w/ embolic event. Restarting anticoagulation (was not on PTA). INR Goal: 2 - 3    Drugs that may increase INR: None  Drugs that may decrease INR: None  Other current anticoagulants/ drugs that may increase bleeding risk: None  Risk factors: Age > 65  Daily INR ordered: YES    Recent Labs     03/25/22  0545 03/24/22  0214 03/23/22  1219 03/23/22  0115   HGB  --  8.5* 9.4* 9.3*   INR 1.3* 1.2*  --   --      Date               INR                  Dose  3/20              1.1        3/23                                        4 mg (given @ 2104)  3/24   1.2     2.5 mg  3/24   1.3     2.5 mg    Assessment/ Plan: Will order warfarin 2.5 mg PO x 1 dose. Pharmacy will continue to monitor daily and adjust therapy as indicated. Please contact the pharmacist at x [phone] for outpatient recommendations if needed.

## 2022-03-25 NOTE — PROGRESS NOTES
[facility] Adult  Hospitalist Group                                                                                          Hospitalist Progress Note  Consuelo Saenz MD  Answering service: [phone] OR [phone] from in house phone        Date of Service:  3/24/2022  NAME:  Dane Soto  :  1935  MRN:  [id number]      Admission Summary:   Copied form admit: \"    82 y.o. female presents with several days duration of abrupt onset, constant, gradually worsening, severe, nonradiating, sharp, bilateral hip pain is associated bilateral black eyes and happened after a fall. Patient denies exacerbating features  and denies remitting features.     Patient fell last week onto her nose, she is doing just fine, but woke up this morning she is not feeling well, could not walk. Incidental findings on imaging showed cervical stenosis, for which the emergency department consulted neurosurgery, who suggested that the patient be admitted and MRI be completed. \"    Interval history / Subjective:     3/24/2022 :   not confused  Feels well overall  Stable for DC to SNF     Assessment & Plan:     Spinal stenosis in cervical region (3/16/2022)  3/17:   Stabilized w cervical collar til needs proven otherwise via MRI cervical neck   3/18:   Cont in cervical collar   MRI pending   Pt hypotensive through night and am 3/18/2022  - not seemingly vol responsive    ICU intensivist to eval   Septic w/u started  3/21: above resovled, treated w abx for uti ; else    Await MRI   Cont on cervcial colar, minor    Cardiology to clear for MRI   3/22:   Confusional last pm acting out w same   MRI completed , await final results   [place] Gardner   3/23:  · Less confused  · MRI suggest recent cva  · CVA w/u   · Neurology  notes best to proceed w anticoagulation til surgury. · Neurosurg weighed in w thoughts per Dr. Raj Ward note appreicated   · Dispo once stable to likely SNF .      Aortic gene replacement status, off OAC x past 2 years on ASA per last notes and pt confirms same \"Coumadin was replaced with low dose ASA\"     UTI, rocephin, cult ( 3/19)     Hypotension resolved as an acute issue      Lyla heart dz w prosthetic valve; off [address] x past 2 years on ASA per last notes and pt confirms same \"Coumadin was replaced with low dose ASA\"   Now w CVA noted ? Consider ASA failure and needs for NOAC/OAC    Hyperlipidemia    Dementia        Hospital Problems  Date Reviewed: 3/17/2022          Codes Class Noted POA    Spinal stenosis in cervical region ICD-10-CM: M48.02  ICD-9-CM: 723.0  3/16/2022 Unknown                  After personally interviewing pt at bedside the following is noted on 3/24/2022 :    Review of Systems   Constitutional: Negative for chills, fever, malaise/fatigue and weight loss. Respiratory: Negative. Cardiovascular: Negative for chest pain. All other systems reviewed and are negative. I had a face to face encounter with patient on 3/24/2022 at bedside for the following physical exam:     PHYSICAL EXAM:    Visit Vitals  /81 (BP 1 Location: Right upper arm, BP Patient Position: At rest)   Pulse 64   Temp 98.3 °F (36.8 °C)   Resp 17   Ht 5' (1.524 m)   Wt 58.7 kg (129 lb 8 oz)   SpO2 98%   Breastfeeding No   BMI 25.29 kg/m²          Physical Exam  Constitutional:       General: She is not in acute distress. Appearance: She is not ill-appearing, toxic-appearing or diaphoretic. HENT:      Head: Normocephalic and atraumatic. Right Ear: External ear normal.      Left Ear: External ear normal.      Nose: Nose normal.      Mouth/Throat:      Mouth: Mucous membranes are dry. Pharynx: Oropharynx is clear. Eyes:      General:         Right eye: No discharge. Left eye: No discharge. Extraocular Movements: Extraocular movements intact. Conjunctiva/sclera: Conjunctivae normal.      Pupils: Pupils are equal, round, and reactive to light.    Cardiovascular:      Rate and Rhythm: Normal rate and regular rhythm. Pulmonary:      Effort: Pulmonary effort is normal. No respiratory distress. Abdominal:      General: Abdomen is flat. There is no distension. Palpations: Abdomen is soft. Musculoskeletal:         General: No swelling or deformity. Cervical back: Normal range of motion and neck supple. Skin:     General: Skin is warm. Coloration: Skin is not jaundiced or pale. Neurological:      General: No focal deficit present. Mental Status: Mental status is at baseline. Psychiatric:         Mood and Affect: Mood normal.         Behavior: Behavior normal.      Comments:                       Data Review:    Review and/or order of clinical lab test      Labs:     Recent Labs     03/24/22 0214 03/23/22  1219   WBC 9.5 7.8   HGB 8.5* 9.4*   HCT 26.3* 29.4*    294     Recent Labs     03/24/22 0214 03/23/22  0115    138   K 4.2 4.6    108   CO2 25 26   BUN 24* 30*   CREA 0.61 0.73   * 100   CA 8.2* 8.5   MG  --  2.3     No results for input(s): ALT, AP, TBIL, TBILI, TP, ALB, GLOB, GGT, AML, LPSE in the last 72 hours. No lab exists for component: SGOT, GPT, AMYP, HLPSE  Recent Labs     03/24/22 0214 03/23/22  1219   INR 1.2*  --    PTP 12.0*  --    APTT  --  26.9      No results for input(s): FE, TIBC, PSAT, FERR in the last 72 hours. Lab Results   Component Value Date/Time    Folate 7.9 03/17/2022 06:23 AM      No results for input(s): PH, PCO2, PO2 in the last 72 hours. No results for input(s): CPK, CKNDX, TROIQ in the last 72 hours.     No lab exists for component: CPKMB  Lab Results   Component Value Date/Time    Cholesterol, total 230 (H) 03/17/2022 06:23 AM    HDL Cholesterol 56 03/17/2022 06:23 AM    LDL, calculated 153.8 (H) 03/17/2022 06:23 AM    Triglyceride 101 03/17/2022 06:23 AM    CHOL/HDL Ratio 4.1 03/17/2022 06:23 AM     No results found for: [facility]  Lab Results   Component Value Date/Time    Color YELLOW/STRAW 03/19/2022 12:54 AM    Appearance CLEAR 03/19/2022 12:54 AM    Specific gravity 1.016 03/19/2022 12:54 AM    pH (UA) 5.5 03/19/2022 12:54 AM    Protein Negative 03/19/2022 12:54 AM    Glucose Negative 03/19/2022 12:54 AM    Ketone Negative 03/19/2022 12:54 AM    Bilirubin Negative 03/19/2022 12:54 AM    Urobilinogen 1.0 03/19/2022 12:54 AM    Nitrites Negative 03/19/2022 12:54 AM    Leukocyte Esterase LARGE (A) 03/19/2022 12:54 AM    Epithelial cells FEW 03/19/2022 12:54 AM    Bacteria Negative 03/19/2022 12:54 AM    WBC 20-50 03/19/2022 12:54 AM    RBC 0-5 03/19/2022 12:54 AM         Medications Reviewed:     Current Facility-Administered Medications   Medication Dose Route Frequency    tamsulosin (FLOMAX) capsule 0.4 mg  0.4 mg Oral DAILY    rosuvastatin (CRESTOR) tablet 20 mg  20 mg Oral QHS    Warfarin- pharmacy to dose   Other Rx Dosing/Monitoring    midodrine (PROAMATINE) tablet 5 mg  5 mg Oral BID WITH MEALS    acetaminophen (TYLENOL) tablet 650 mg  650 mg Oral Q4H PRN    [Held by provider] lisinopriL (PRINIVIL, ZESTRIL) tablet 10 mg  10 mg Oral DAILY    risperiDONE (RisperDAL) tablet 1 mg  1 mg Oral BID    memantine (NAMENDA) tablet 5 mg  5 mg Oral BID    sertraline (ZOLOFT) tablet 25 mg  25 mg Oral DAILY    HYDROcodone-acetaminophen (NORCO) 5-325 mg per tablet 1 Tablet  1 Tablet Oral Q6H PRN     ______________________________________________________________________  EXPECTED LENGTH OF STAY: 2d 21h  ACTUAL LENGTH OF STAY:          8                 Jh Plunkett MD

## 2022-03-25 NOTE — DISCHARGE SUMMARY
Discharge Summary       PATIENT ID: Shashi Alexis  MRN: [id number]   YOB: 1935    DATE OF ADMISSION: 3/16/2022 10:25 PM    DATE OF DISCHARGE: 3/25/22 1pm   PRIMARY CARE PROVIDER: Hercules Kussmaul, MD     ATTENDING PHYSICIAN: Maura Naranjo  DISCHARGING PROVIDER: Pam Riedel, MD    To contact this individual call [phone] and ask the  to page. If unavailable ask to be transferred the Adult Hospitalist Department. CONSULTATIONS: IP CONSULT TO NEUROLOGY  IP CONSULT TO INTENSIVIST  IP CONSULT TO NEUROSURGERY  IP CONSULT TO CARDIOLOGY    PROCEDURES/SURGERIES: * No surgery found *    ADMISSION SUMMARY :   82 y. o. female presents with several days duration of abrupt onset, constant, gradually worsening, severe, nonradiating, sharp, bilateral hip pain is associated bilateral black eyes and happened after a fall.  Patient denies exacerbating features  and denies remitting features.     Patient fell last week onto her nose, she is doing just fine, but woke up this morning she is not feeling well, could not walk.  Incidental findings on imaging showed cervical stenosis, for which the emergency department consulted neurosurgery, who suggested that the patient be admitted and MRI be completed.  \"      HOSPITAL COURSE; DISCHARGE DIAGNOSES / PLAN:      Spinal stenosis in cervical region (3/16/2022)  3/17:  · Stabilized w cervical collar til needs proven otherwise via MRI cervical neck   3/18:  · Cont in cervical collar  · MRI pending  · Pt hypotensive through night and am 3/18/2022  - not seemingly vol responsive   · ICU intensivist to eval  · Septic w/u started  3/21: above resovled, treated w abx for uti ; else   · Await MRI  · Cont on cervcial colar, minor   · Cardiology to clear for MRI   3/22:  · Confusional last pm acting out w same  · MRI completed , await final results   · Judeen Glance   3/23:  · Less confused  · MRI suggest recent cva  · CVA w/u   · Neurology  notes best to proceed w anticoagulation til surgury. · Neurosurg weighed in w thoughts per Dr. Raj Ward note appreicated   · Dispo once stable to likely SNF .      Aortic lyla replacement status, off OAC x past 2 years on ASA per last notes and pt confirms same \"Coumadin was replaced with low dose ASA\"   Resume coumadin due to afib noted on pacemaker     UTI, rocephin, cult ( 3/19)      Hypotension resolved as an acute issue      Lyla heart dz w prosthetic valve; off [address] x past 2 years on ASA per last notes and pt confirms same \"Coumadin was replaced with low dose ASA\"   Now w CVA noted ? Coumadin on DC     Hyperlipidemia- cont crestor     Dementia- stable- received intermittent haldol during admission    PENDING TEST RESULTS:   At the time of discharge the following test results are still pending: ECHO results     ADDITIONAL CARE RECOMMENDATIONS:   Regarding the MRI finding of your cervical spine:  C3-4:  severe central stenosis and cord compression with central cord ischemic change suggested,   Plan will be cervical collar for 6 weeks. CT of Cervical spine (no contrast) in 6 weeks. If CT is stable we will consider transitioning her to a soft collar.   Follow up with Dr Demetrice Landaverde - neurosurgery as instructed    Regarding Brain MRI findings of :  numerous small foci of acute infarction covering multiple vascular territories, which may be due to a central  embolic process  Neurology recommend restarting anticoagulation - coumadin; goal INR 2-3  No follow up recommended     Patient was seen by cardiology/EP this admission- Dr Keyona Leroy rep checked pacer - 3 years battery- Model Advisa MRI   9925 RA and RV lead MRI compatible  All parameters are normal; 29 PAtrial Tachycardia and 1 hr PAtrial Fibrillation 3/2/22  1-7 second NSVT; resume coumadin , ECHO done prior to DC- results pending  No specific follow up recommended    HDL- XJD=314; cont current dose crestor    Continue flomax for urinary retention- orders to DC rivero catheter on 3/23/22 NOTIFY YOUR PHYSICIAN FOR ANY OF THE FOLLOWING:   Fever over 101 degrees for 24 hours. Chest pain, shortness of breath, fever, chills, nausea, vomiting, diarrhea, change in mentation, falling, weakness, bleeding. Severe pain or pain not relieved by medications, as well as any other signs or symptoms that you may have questions about. FOLLOW UP APPOINTMENTS:    Follow-up Information     Follow up With Specialties Details Why Contact Info    Sparkle Velasquez MD Neurosurgery Schedule an appointment as soon as possible for a visit in 6 weeks Will need CT scan prior to this appointment [facility] [address] [facility]      Samia Alejandra, [address] [place] [address]  [address] [facility]  [phone]           DIET: Regular Diet    ACTIVITY: Activity as tolerated and PT/OT Eval and Treat    EQUIPMENT needed: none    DISCHARGE MEDICATIONS:  Current Discharge Medication List      START taking these medications    Details   acetaminophen (TYLENOL) 325 mg tablet Take 2 Tablets by mouth every four (4) hours as needed for Pain or Fever. Qty: 30 Tablet, Refills: 0  Start date: 3/25/2022      midodrine (PROAMATINE) 5 mg tablet Take 1 Tablet by mouth two (2) times daily (with meals) for 30 days. Qty: 60 Tablet, Refills: 1  Start date: 3/25/2022, End date: 4/24/2022      rosuvastatin (CRESTOR) 20 mg tablet Take 1 Tablet by mouth nightly. Qty: 30 Tablet, Refills: 2  Start date: 3/25/2022      tamsulosin (FLOMAX) 0.4 mg capsule Take 1 Capsule by mouth daily. Qty: 30 Capsule, Refills: 2  Start date: 3/26/2022      warfarin (COUMADIN) 5 mg tablet Take 1 Tablet by mouth every Monday, Wednesday, Friday, Saturday for 30 days, THEN 0.5 Tablets every Sunday, Tuesday, Thursday for 30 days.   Qty: 30 Tablet, Refills: 1  Start date: 3/25/2022, End date: 5/24/2022         CONTINUE these medications which have NOT CHANGED    Details   risperiDONE (RisperDAL) 1 mg tablet TAKE 1 TAB BY MOUTH TWO TIMES A DAY.  Qty: 60 Tablet, Refills: 11      memantine (NAMENDA) 5 mg tablet Take 1 Tablet by mouth two (2) times a day. Qty: 60 Tablet, Refills: 11      sertraline (ZOLOFT) 25 mg tablet Take 1 Tablet by mouth daily.   Qty: 30 Tablet, Refills: 11         STOP taking these medications       oxybutynin (DITROPAN) 5 mg tablet Comments:   Reason for Stopping:         inhalational spacing device Comments:   Reason for Stopping:         albuterol (PROVENTIL HFA, VENTOLIN HFA, PROAIR HFA) 90 mcg/actuation inhaler Comments:   Reason for Stopping:         furosemide (LASIX) 40 mg tablet Comments:   Reason for Stopping:         pantoprazole (PROTONIX) 40 mg tablet Comments:   Reason for Stopping:         tolterodine ER (DETROL LA) 4 mg ER capsule Comments:   Reason for Stopping:         metoprolol tartrate (LOPRESSOR) 50 mg tablet Comments:   Reason for Stopping:         lisinopril (PRINIVIL, ZESTRIL) 10 mg tablet Comments:   Reason for Stopping:         atorvastatin (LIPITOR) 40 mg tablet Comments:   Reason for Stopping:               DISPOSITION:    Home With:   OT  PT  HH  RN      X SNF/    Independent/assisted living    Hospice    Other:       PATIENT CONDITION AT DISCHARGE:     Functional status    Poor    X Deconditioned     Independent      Cognition     Lucid     Forgetful    X Dementia      Catheters/lines (plus indication)    Staples     PICC     PEG    X None      Code status   X  Full code     DNR      PHYSICAL EXAMINATION AT DISCHARGE:  Patient Vitals for the past 24 hrs:   Temp Pulse Resp BP SpO2   03/25/22 1257 97.6 °F (36.4 °C) 72 16 129/70    03/25/22 1200  60      03/25/22 1051    (!) 147/76    03/25/22 1000 98.1 °F (36.7 °C) 76 16 134/88 96 %   03/25/22 0932  72  (!) 147/76    03/25/22 0800     97 %   03/25/22 0611 (!) 96.7 °F (35.9 °C) 60 12 100/87 95 %   03/25/22 0600  75      03/25/22 0400  60      03/25/22 0222 98.2 °F (36.8 °C) 78 14 (!) 105/55 97 %   03/25/22 0200  65      03/25/22 0000  75      03/24/22 2205 98.3 °F (36.8 °C) 64 17 120/81 98 %   03/24/22 2200  65      03/24/22 2000  65      03/24/22 1800  67      03/24/22 1759 98.4 °F (36.9 °C) 72 15 (!) 142/70 99 %   03/24/22 1629  65  (!) 146/80    03/24/22 1400 98.6 °F (37 °C) 66 20 106/71 97 %        Constitutional:       General: She is not in acute distress. Appearance: She is not ill-appearing, toxic-appearing or diaphoretic. HENT:      Head: Normocephalic and atraumatic. Right Ear: External ear normal.      Left Ear: External ear normal.      Nose: Nose normal.      Mouth/Throat:      Mouth: Mucous membranes are dry. Pharynx: Oropharynx is clear. Eyes:      General:         Right eye: No discharge. Left eye: No discharge. Extraocular Movements: Extraocular movements intact. Conjunctiva/sclera: Conjunctivae normal.      Pupils: Pupils are equal, round, and reactive to light. Cardiovascular:      Rate and Rhythm: Normal rate and regular rhythm. Pulmonary:      Effort: Pulmonary effort is normal. No respiratory distress. Abdominal:      General: Abdomen is flat. There is no distension. Palpations: Abdomen is soft. Musculoskeletal:         General: No swelling or deformity. Cervical back: Normal range of motion and neck supple. Skin:     General: Skin is warm. Coloration: Skin is not jaundiced or pale. Neurological:      General: No focal deficit present. Mental Status: Mental status is at baseline.    Psychiatric:         Mood and Affect: flat         Behavior: demented without agitation              Data Review:    Review and/or order of clinical lab test        Labs:           Recent Labs     03/24/22  0214 03/23/22  1219   WBC 9.5 7.8   HGB 8.5* 9.4*   HCT 26.3* 29.4*    294           Recent Labs     03/24/22  0214 03/23/22  0115    138   K 4.2 4.6    108   CO2 25 26   BUN 24* 30*   CREA 0.61 0.73   * 100   CA 8.2* 8.5   MG --  2.3      No results for input(s): ALT, AP, TBIL, TBILI, TP, ALB, GLOB, GGT, AML, LPSE in the last 72 hours.     No lab exists for component: SGOT, GPT, AMYP, HLPSE       Recent Labs     03/24/22  0214 03/23/22  1219   INR 1.2*  --    PTP 12.0*  --    APTT  --  26.9      No results for input(s): FE, TIBC, PSAT, FERR in the last 72 hours. Lab Results   Component Value Date/Time     Folate 7.9 03/17/2022 06:23 AM      No results for input(s): PH, PCO2, PO2 in the last 72 hours.   No results for input(s): CPK, CKNDX, TROIQ in the last 72 hours.     No lab exists for component: CPKMB        Lab Results   Component Value Date/Time     Cholesterol, total 230 (H) 03/17/2022 06:23 AM     HDL Cholesterol 56 03/17/2022 06:23 AM     LDL, calculated 153.8 (H) 03/17/2022 06:23 AM     Triglyceride 101 03/17/2022 06:23 AM     CHOL/HDL Ratio 4.1 03/17/2022 06:23 AM      No results found for: [facility]        Lab Results   Component Value Date/Time     Color YELLOW/STRAW 03/19/2022 12:54 AM     Appearance CLEAR 03/19/2022 12:54 AM     Specific gravity 1.016 03/19/2022 12:54 AM     pH (UA) 5.5 03/19/2022 12:54 AM     Protein Negative 03/19/2022 12:54 AM     Glucose Negative 03/19/2022 12:54 AM     Ketone Negative 03/19/2022 12:54 AM     Bilirubin Negative 03/19/2022 12:54 AM     Urobilinogen 1.0 03/19/2022 12:54 AM     Nitrites Negative 03/19/2022 12:54 AM     Leukocyte Esterase LARGE (A) 03/19/2022 12:54 AM     Epithelial cells FEW 03/19/2022 12:54 AM     Bacteria Negative 03/19/2022 12:54 AM     WBC 20-50 03/19/2022 12:54 AM     RBC 0-5 03/19/2022 12:54 AM           CHRONIC MEDICAL DIAGNOSES:  Problem List as of 3/25/2022 Date Reviewed: 3/17/2022          Codes Class Noted - Resolved    Spinal stenosis in cervical region ICD-10-CM: M48.02  ICD-9-CM: 723.0  3/16/2022 - Present              Greater than 30 minutes were spent with the patient on counseling and coordination of care    Signed:   Ronaldo Felty, MD  3/25/2022  12:59 PM   .

## 2022-03-25 NOTE — PROGRESS NOTES
Transition of Care Plan: [facility]  RN to [phone]    RUR: 10% low     PCP F/U: Dr. Mayco Arnett     Disposition: [id number]     Transportation: [facility] 1pm     Main Contact: Scott WELLER [phone]     1105: [facility] accepted. Spoke with son. Agreeable. [facility] set up at 1pm. RN notified. Telephone call to [facility] to get room and report number. Charles Feliciano RN, CRM    Medicare pt has received, reviewed, and signed 2nd IM letter informing them of their right to appeal the discharge. Signed copy has been placed on pt bedside chart. Transition of Care Plan to SNF/Rehab    SNF/Rehab Transition:  Patient has been accepted to [facility] and meets criteria for admission. Patient will transported by [facility]  and expected to leave at 1pm.    Communication to Patient/Family:  Met with patient and Scott and they are agreeable to the transition plan. Communication to SNF/Rehab:  Bedside RN, Negrito Ramos, has been notified to update the transition plan to the facility and call report to [phone]  Discharge information has been updated on the AVS.     Discharge instructions to be sent to facility . Nursing Please include all hard scripts for controlled substances, med rec and dc summary, and AVS in packet.      Reviewed and confirmed with facility,[facility], can manage the patient care needs for the following:     Elnor Power with (X) only those applicable:    Medication:  [x]  Medications will be available at the facility  []  IV Antibiotics   []  Controlled Substance - hard copy to be sent with patient   [x]  Weekly Labs   Documents:  [] Hard RX  [x] MAR  [x] Kardex  [] AVS  []Transfer Summary  [x]Discharge   Equipment:  []  CPAP/BiPAP  []  Wound Vacuum  [x]  Staples or Urinary Device  []  PICC/Central Line  []  Nebulizer  []  Ventilator   Treatment:  []Isolation (for MRSA, VRE, etc.)  []Surgical Drain Management  []Tracheostomy Care  []Dressing Changes  []Dialysis with transportation and chair time  []PEG Care  []Oxygen  []Daily Weights for Heart Failure   Dietary:  []Any diet limitations  []Tube Feedings   []Total Parenteral Management (TPN)   Eligible for Medicaid Long Term Services and Supports  Yes:  [] Eligible for medical assistance or will become eligible within 180 days and UAI completed. [] Provider/Patient and/or support system has requested screening. [] UAI copy provided to patient or responsible party  [] UAI unavailable at discharge will send once processed to SNF provider. [] UAI unavailable at discharged mailed to patient  No:   [x] Private pay and is not financially eligible for Medicaid within the next 180 days. [] Reside out-of-state.   [] A residents of a state owned/operated facility that is licensed  by [facility] or Located within [facility]  [] Enrollment in [facility] hospice services  []  [address]  [] Patient /Family declines to have screening completed or provide financial information for screening     Financial Resources:  Medicaid    [] Initiated and application pending   [] Full coverage     Advanced Care Plan:  []Surrogate Decision Maker of Care  []POA  []Communicated Code Status Full Code   Other

## 2022-03-25 NOTE — DISCHARGE INSTRUCTIONS
Regarding the MRI finding of your cervical spine:  C3-4:  severe central stenosis and cord compression with central cord ischemic change suggested,   Plan will be cervical collar for 6 weeks. CT of Cervical spine (no contrast) in 6 weeks. If CT is stable we will consider transitioning her to a soft collar.   Follow up with Dr Mariana Santana - neurosurgery as instructed    Regarding Brain MRI findings of :  numerous small foci of acute infarction covering multiple vascular territories, which may be due to a central  embolic process  Neurology recommend restarting anticoagulation - coumadin; goal INR 2-3  No follow up recommended     Patient was seen by cardiology/EP this admission- Dr Adam Kurtz rep checked pacer - 3 years battery- Model Advisa MRI DR  9478 RA and RV lead MRI compatible  All parameters are normal; 29 PAtrial Tachycardia and 1 hr PAtrial Fibrillation 3/2/22  1-7 second NSVT; resume coumadin , ECHO done prior to DC- results pending  No specific follow up recommended    HDL- PIP=801; cont current dose crestor    Continue flomax for urinary retention- orders to DC rivero catheter on 3/23/22

## 2022-03-29 ENCOUNTER — TRANSCRIBE ORDER (OUTPATIENT)
Dept: SCHEDULING | Age: 87
End: 2022-03-29

## 2022-03-29 DIAGNOSIS — M48.02 CERVICAL SPINAL STENOSIS: Primary | ICD-10-CM

## 2022-04-12 PROCEDURE — 95816 EEG AWAKE AND DROWSY: CPT | Performed by: PSYCHIATRY & NEUROLOGY

## 2022-04-13 NOTE — PROCEDURES
[address]   EEG    Name:  Tereso Evans  MR#:  [id number]  :  1935  ACCOUNT #:  [de-identified]  DATE OF SERVICE:  2022      REQUESTING PHYSICIAN:  Dr. Onel Rose. HISTORY:  The patient is an 68-year-old female who is being evaluated for altered mental status. Imaging revealed embolic-appearing infarcts. DESCRIPTION:  This is an 18-channel EEG performed on an awake patient. The dominant posterior background rhythm consists of symmetric well-modulated medium voltage rhythms in the 9-10 Hz frequency range out of the posterior head region. Faster frequencies were seen in the frontal areas. Drowsiness and sleep states were not recorded. Photic stimulation elicits a symmetric driving response. Hyperventilation was not performed. EEG SUMMARY:  Normal study. CLINICAL INTERPRETATION:  This was a normal EEG representing wakeful state of the patient. No clearly lateralizing or epileptiform features were noted. No seizure was recorded.       Melany Garcia MD      [id number]  D:  2022 15:53  T:  2022 19:53  JOB #:  [id number]

## 2022-04-21 ENCOUNTER — HOSPITAL ENCOUNTER (OUTPATIENT)
Dept: CT IMAGING | Age: 87
Discharge: HOME | End: 2022-04-21
Attending: NEUROLOGICAL SURGERY
Payer: MEDICARE

## 2022-04-21 DIAGNOSIS — M48.02 CERVICAL SPINAL STENOSIS: ICD-10-CM

## 2022-04-21 PROCEDURE — 72125 CT NECK SPINE W/O DYE: CPT

## 2022-05-13 ENCOUNTER — HOME HEALTH ADMISSION (OUTPATIENT)
Dept: HOME HEALTH SERVICES | Facility: HOME HEALTH | Age: 87
End: 2022-05-13

## 2022-05-15 ENCOUNTER — HOME CARE VISIT (OUTPATIENT)
Dept: HOME HEALTH SERVICES | Facility: HOME HEALTH | Age: 87
End: 2022-05-15

## 2022-05-17 ENCOUNTER — HOME CARE VISIT (OUTPATIENT)
Dept: HOME HEALTH SERVICES | Facility: HOME HEALTH | Age: 87
End: 2022-05-17

## 2022-05-20 ENCOUNTER — APPOINTMENT (OUTPATIENT)
Dept: CT IMAGING | Age: 87
End: 2022-05-20
Attending: EMERGENCY MEDICINE
Payer: MEDICARE

## 2022-05-20 ENCOUNTER — HOSPITAL ENCOUNTER (EMERGENCY)
Age: 87
Discharge: HOME | End: 2022-05-20
Attending: EMERGENCY MEDICINE
Payer: MEDICARE

## 2022-05-20 VITALS
TEMPERATURE: 98.4 F | RESPIRATION RATE: 16 BRPM | DIASTOLIC BLOOD PRESSURE: 68 MMHG | SYSTOLIC BLOOD PRESSURE: 136 MMHG | WEIGHT: 118 LBS | OXYGEN SATURATION: 98 % | HEIGHT: 60 IN | HEART RATE: 67 BPM | BODY MASS INDEX: 23.16 KG/M2

## 2022-05-20 DIAGNOSIS — S02.2XXA CLOSED FRACTURE OF NASAL BONE, INITIAL ENCOUNTER: ICD-10-CM
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
## 2022-05-20 DIAGNOSIS — M25.562 CHRONIC PAIN OF LEFT KNEE: ICD-10-CM

## 2022-05-20 DIAGNOSIS — G89.29 CHRONIC PAIN OF LEFT KNEE: ICD-10-CM

## 2022-05-20 DIAGNOSIS — S09.90XA CLOSED HEAD INJURY, INITIAL ENCOUNTER: ICD-10-CM

## 2022-05-20 LAB
ALBUMIN SERPL-MCNC: 3.4 G/DL (ref 3.5–5)
ALBUMIN/GLOB SERPL: 0.9 {RATIO} (ref 1.1–2.2)
ALP SERPL-CCNC: 127 U/L (ref 45–117)
ALT SERPL-CCNC: 24 U/L (ref 12–78)
ANION GAP SERPL CALC-SCNC: 8 MMOL/L (ref 5–15)
APPEARANCE UR: CLEAR
APTT PPP: 39.1 SEC (ref 22.1–31)
AST SERPL-CCNC: 19 U/L (ref 15–37)
BACTERIA URNS QL MICRO: NEGATIVE /HPF
BASOPHILS # BLD: 0.1 K/UL (ref 0–0.1)
BASOPHILS NFR BLD: 1 % (ref 0–1)
BILIRUB SERPL-MCNC: 0.4 MG/DL (ref 0.2–1)
BILIRUB UR QL: NEGATIVE
BUN SERPL-MCNC: 14 MG/DL (ref 6–20)
BUN/CREAT SERPL: 28 (ref 12–20)
CALCIUM SERPL-MCNC: 8.9 MG/DL (ref 8.5–10.1)
CHLORIDE SERPL-SCNC: 105 MMOL/L (ref 97–108)
CO2 SERPL-SCNC: 27 MMOL/L (ref 21–32)
COLOR UR: ABNORMAL
CREAT SERPL-MCNC: 0.5 MG/DL (ref 0.55–1.02)
DIFFERENTIAL METHOD BLD: ABNORMAL
EOSINOPHIL # BLD: 1.5 K/UL (ref 0–0.4)
EOSINOPHIL NFR BLD: 18 % (ref 0–7)
EPITH CASTS URNS QL MICRO: ABNORMAL /LPF
ERYTHROCYTE [DISTWIDTH] IN BLOOD BY AUTOMATED COUNT: 14.8 % (ref 11.5–14.5)
GLOBULIN SER CALC-MCNC: 3.7 G/DL (ref 2–4)
GLUCOSE SERPL-MCNC: 81 MG/DL (ref 65–100)
GLUCOSE UR STRIP.AUTO-MCNC: NEGATIVE MG/DL
HCT VFR BLD AUTO: 32.6 % (ref 35–47)
HGB BLD-MCNC: 10 G/DL (ref 11.5–16)
HGB UR QL STRIP: NEGATIVE
IMM GRANULOCYTES # BLD AUTO: 0 K/UL (ref 0–0.04)
IMM GRANULOCYTES NFR BLD AUTO: 0 % (ref 0–0.5)
INR PPP: 4.3 (ref 0.9–1.1)
KETONES UR QL STRIP.AUTO: NEGATIVE MG/DL
LEUKOCYTE ESTERASE UR QL STRIP.AUTO: ABNORMAL
LYMPHOCYTES # BLD: 2.1 K/UL (ref 0.8–3.5)
LYMPHOCYTES NFR BLD: 25 % (ref 12–49)
MCH RBC QN AUTO: 27 PG (ref 26–34)
MCHC RBC AUTO-ENTMCNC: 30.7 G/DL (ref 30–36.5)
MCV RBC AUTO: 88.1 FL (ref 80–99)
MONOCYTES # BLD: 0.6 K/UL (ref 0–1)
MONOCYTES NFR BLD: 7 % (ref 5–13)
NEUTS SEG # BLD: 3.9 K/UL (ref 1.8–8)
NEUTS SEG NFR BLD: 49 % (ref 32–75)
NITRITE UR QL STRIP.AUTO: NEGATIVE
NRBC # BLD: 0 K/UL (ref 0–0.01)
NRBC BLD-RTO: 0 PER 100 WBC
PH UR STRIP: 6 [PH] (ref 5–8)
PLATELET # BLD AUTO: 234 K/UL (ref 150–400)
PMV BLD AUTO: 9.7 FL (ref 8.9–12.9)
POTASSIUM SERPL-SCNC: 4.3 MMOL/L (ref 3.5–5.1)
PROT SERPL-MCNC: 7.1 G/DL (ref 6.4–8.2)
PROT UR STRIP-MCNC: NEGATIVE MG/DL
PROTHROMBIN TIME: 39.9 SEC (ref 9–11.1)
RBC # BLD AUTO: 3.7 M/UL (ref 3.8–5.2)
RBC #/AREA URNS HPF: ABNORMAL /HPF (ref 0–5)
RBC MORPH BLD: ABNORMAL
RBC MORPH BLD: ABNORMAL
SODIUM SERPL-SCNC: 140 MMOL/L (ref 136–145)
SP GR UR REFRACTOMETRY: 1.01 (ref 1–1.03)
THERAPEUTIC RANGE,PTTT: ABNORMAL SECS (ref 58–77)
UR CULT HOLD, URHOLD: NORMAL
UROBILINOGEN UR QL STRIP.AUTO: 0.2 EU/DL (ref 0.2–1)
WBC # BLD AUTO: 8.2 K/UL (ref 3.6–11)
WBC URNS QL MICRO: ABNORMAL /HPF (ref 0–4)

## 2022-05-20 PROCEDURE — 85610 PROTHROMBIN TIME: CPT

## 2022-05-20 PROCEDURE — 85730 THROMBOPLASTIN TIME PARTIAL: CPT

## 2022-05-20 PROCEDURE — 80053 COMPREHEN METABOLIC PANEL: CPT

## 2022-05-20 PROCEDURE — 74011250637 HC RX REV CODE- 250/637: Performed by: EMERGENCY MEDICINE

## 2022-05-20 PROCEDURE — 70450 CT HEAD/BRAIN W/O DYE: CPT

## 2022-05-20 PROCEDURE — 36415 COLL VENOUS BLD VENIPUNCTURE: CPT

## 2022-05-20 PROCEDURE — 70486 CT MAXILLOFACIAL W/O DYE: CPT

## 2022-05-20 PROCEDURE — 99284 EMERGENCY DEPT VISIT MOD MDM: CPT

## 2022-05-20 PROCEDURE — 77030019905 HC CATH URETH INTMIT MDII -A

## 2022-05-20 PROCEDURE — 85025 COMPLETE CBC W/AUTO DIFF WBC: CPT

## 2022-05-20 PROCEDURE — 81001 URINALYSIS AUTO W/SCOPE: CPT

## 2022-05-20 RX ORDER — ATORVASTATIN CALCIUM 80 MG/1
TABLET, FILM COATED ORAL
COMMUNITY
Start: 2022-05-13

## 2022-05-20 RX ORDER — TRAMADOL HYDROCHLORIDE 50 MG/1
25 TABLET ORAL
Status: COMPLETED | OUTPATIENT
Start: 2022-05-20 | End: 2022-05-20

## 2022-05-20 RX ORDER — MIDODRINE HYDROCHLORIDE 5 MG/1
TABLET ORAL
COMMUNITY
Start: 2022-05-13

## 2022-05-20 RX ADMIN — TRAMADOL HYDROCHLORIDE 25 MG: 50 TABLET, FILM COATED ORAL at 12:40

## 2022-05-20 NOTE — ED TRIAGE NOTES
Pt to ed from home with left knee pain. Family reports fall on Wednesday night. Taking blood thinners. Family states has been more confused than normal (hx of dementia). abrasion noted to the nose. Recent admission from a fall and spinal stenosis.

## 2022-05-20 NOTE — ED PROVIDER NOTES
The history is provided by the patient and a relative. Fall  The accident occurred 2 days ago. The fall occurred while walking. She fell from a height of ground level. She landed on carpet. The volume of blood lost was minimal. The point of impact was the head. The pain is present in the head. The pain is mild. She was ambulatory at the scene. There was no entrapment after the fall. There was no drug use involved in the accident. There was no alcohol use involved in the accident. Associated symptoms include laceration. Pertinent negatives include no visual change, no fever, no numbness, no abdominal pain, no bowel incontinence, no nausea, no vomiting, no hematuria, no headaches, no extremity weakness, no hearing loss, no loss of consciousness and no tingling. The risk factors include dementia, being elderly, recurrent falls, cardiac disease, new medication and change in medication. The symptoms are aggravated by pressure on injury. She has tried nothing for the symptoms. The treatment provided no relief. It is unknown when the patient last had a tetanus shot.         Past Medical History:   Diagnosis Date    Arthritis     Dementia ([facility] [address]     High cholesterol     Hypertension        Past Surgical History:   Procedure Laterality Date    HX ORTHOPAEDIC      HX PACEMAKER      [place] CARDIAC SURG PROCEDURE UNLIST      Aortic Valve Replacement         Family History:   Problem Relation Age of Onset    Dementia Other     Heart Disease Other        Social History     Socioeconomic History    Marital status:      Spouse name: Not on file    Number of children: Not on file    Years of education: Not on file    Highest education level: Not on file   Occupational History    Not on file   Tobacco Use    Smoking status: Never Smoker    Smokeless tobacco: Never Used   Vaping Use    Vaping Use: Never used   Substance and Sexual Activity    Alcohol use: No    Drug use: Never    Sexual activity: Not on file Other Topics Concern     Service Not Asked    Blood Transfusions Not Asked    Caffeine Concern Not Asked    Occupational Exposure Not Asked    Hobby Hazards Not Asked    Sleep Concern Not Asked    Stress Concern Not Asked    Weight Concern Not Asked    Special Diet Not Asked    Back Care Not Asked    Exercise Not Asked    Bike Helmet Not Asked   [address] Not Asked    Self-Exams Not Asked   Social History Narrative    Not on file     Social Determinants of Health     Financial Resource Strain:     Difficulty of Paying Living Expenses: Not on file   Food Insecurity:     Worried About Running Out of Food in the Last Year: Not on file    Jessica of Food in the Last Year: Not on file   Transportation Needs:     Lack of Transportation (Medical): Not on file    Lack of Transportation (Non-Medical): Not on file   Physical Activity:     Days of Exercise per Week: Not on file    Minutes of Exercise per Session: Not on file   Stress:     Feeling of Stress : Not on file   Social Connections:     Frequency of Communication with Friends and Family: Not on file    Frequency of Social Gatherings with Friends and Family: Not on file    Attends Yarsani Services: Not on file    Active Member of [address] [place] [address] or Organizations: Not on file    Attends Club or Organization Meetings: Not on file    Marital Status: Not on file   Intimate Partner Violence:     Fear of Current or Ex-Partner: Not on file    Emotionally Abused: Not on file    Physically Abused: Not on file    Sexually Abused: Not on file   Housing Stability:     Unable to Pay for Housing in the Last Year: Not on file    Number of [place] in the Last Year: Not on file    Unstable Housing in the Last Year: Not on file         ALLERGIES: Omeprazole    Review of Systems   Constitutional: Negative for activity change, chills and fever. HENT: Negative for nosebleeds, sore throat, trouble swallowing and voice change.     Eyes: Negative for visual disturbance. Respiratory: Negative for shortness of breath. Cardiovascular: Negative for chest pain and palpitations. Gastrointestinal: Negative for abdominal pain, bowel incontinence, constipation, diarrhea, nausea and vomiting. Genitourinary: Negative for difficulty urinating, dysuria, hematuria and urgency. Musculoskeletal: Positive for arthralgias. Negative for back pain, extremity weakness, neck pain and neck stiffness. Skin: Positive for wound. Negative for color change. Allergic/Immunologic: Negative for immunocompromised state. Neurological: Negative for dizziness, tingling, seizures, loss of consciousness, syncope, weakness, light-headedness, numbness and headaches. Psychiatric/Behavioral: Negative for behavioral problems, confusion, hallucinations, self-injury and suicidal ideas. Vitals:    05/20/22 1114   Weight: 53.5 kg (118 lb)   Height: 5' (1.524 m)            Physical Exam  Vitals and nursing note reviewed. Constitutional:       General: She is not in acute distress. Appearance: She is well-developed. She is cachectic. She is not diaphoretic. HENT:      Head: Normocephalic. Raccoon eyes and abrasion present. Jaw: There is normal jaw occlusion. Nose: Signs of injury and nasal tenderness present. Eyes:      Pupils: Pupils are equal, round, and reactive to light. Cardiovascular:      Rate and Rhythm: Normal rate and regular rhythm. Heart sounds: Normal heart sounds. No murmur heard. No friction rub. No gallop. Pulmonary:      Effort: Pulmonary effort is normal. No respiratory distress. Breath sounds: Normal breath sounds. No wheezing. Abdominal:      General: Bowel sounds are normal. There is no distension. Palpations: Abdomen is soft. Tenderness: There is no abdominal tenderness. There is no guarding or rebound. Musculoskeletal:         General: Normal range of motion.       Cervical back: Normal range of motion and neck supple. No spinous process tenderness or muscular tenderness. Left knee: Bony tenderness present. Skin:     General: Skin is warm. Findings: Laceration present. No rash. Neurological:      Mental Status: She is alert and oriented to person, place, and time. Psychiatric:         Behavior: Behavior normal.         Thought Content: Thought content normal.         Judgment: Judgment normal.          MDM     This is a 53-year-old female with past medical history, review of systems, physical exam as above, presenting with complaints of chronic left knee pain, facial pain, secondary to ground-level fall. Son at bedside states patient with recent admission, and transition to rehab, states the patient was discharged back to the home that she shares with her son and daughter-in-law 1 week ago. Son states the patient was ambulating with her walker 2 days ago when she fell forward, striking her face and head on carpeted floor without loss of consciousness, minimal bleeding secondary to abrasion across the bridge of the nose and left forehead. Patient states patient's left knee pain is chronic in nature, and ongoing, states she was started on low-dose tramadol on Tuesday, the day before her fall. Physical exam is remarkable for a well-appearing elderly female, cachectic, in no acute distress noted to have tenderness to the left knee without obvious deformity, distal pulse motor and sensation intact, clear breath sounds, soft abdomen, regular rate and rhythm without murmurs gallops or rubs, abrasion across the bridge of the nose with tenderness and swelling, as well as healing mild abrasion to the left forehead. Discussed with son inability to provide stronger pain medication given her risks and age, and he is in agreement. Will provide a dose of tramadol here in the emergency department, obtain CT imaging of the head max face, CMP, CBC and UA.   I have discussed with the patient's son as to whether he feels she is safe in the current living situation and he is satisfied with her current arrangement. We will reassess, and make a disposition.     Procedures

## 2022-05-20 NOTE — ED NOTES
Pt was discharged at this time. Pt and son verbalized understanding of all discharge instructions. Pt remains a&ox3. Resps are even and unlabored. Skin warm and dry. No distress noted. Pt assisted out of ed via wheelchair.

## 2022-06-09 ENCOUNTER — TRANSCRIBE ORDER (OUTPATIENT)
Dept: SCHEDULING | Age: 87
End: 2022-06-09

## 2022-06-09 DIAGNOSIS — M25.562 LEFT KNEE PAIN: Primary | ICD-10-CM

## 2022-09-21 ENCOUNTER — OFFICE VISIT (OUTPATIENT)
Dept: NEUROLOGY | Age: 87
End: 2022-09-21
Payer: MEDICARE

## 2022-09-21 VITALS
RESPIRATION RATE: 16 BRPM | DIASTOLIC BLOOD PRESSURE: 70 MMHG | SYSTOLIC BLOOD PRESSURE: 161 MMHG | HEART RATE: 70 BPM | OXYGEN SATURATION: 98 %

## 2022-09-21 DIAGNOSIS — F03.92 DEMENTIA WITH PSYCHOSIS: Primary | ICD-10-CM

## 2022-09-21 DIAGNOSIS — M48.02 SPINAL STENOSIS IN CERVICAL REGION: ICD-10-CM

## 2022-09-21 DIAGNOSIS — I63.19 CEREBROVASCULAR ACCIDENT (CVA) DUE TO EMBOLISM OF OTHER PRECEREBRAL ARTERY (HCC): ICD-10-CM

## 2022-09-21 PROCEDURE — G8510 SCR DEP NEG, NO PLAN REQD: HCPCS | Performed by: PSYCHIATRY & NEUROLOGY

## 2022-09-21 PROCEDURE — G8400 PT W/DXA NO RESULTS DOC: HCPCS | Performed by: PSYCHIATRY & NEUROLOGY

## 2022-09-21 PROCEDURE — 99214 OFFICE O/P EST MOD 30 MIN: CPT | Performed by: PSYCHIATRY & NEUROLOGY

## 2022-09-21 PROCEDURE — 1123F ACP DISCUSS/DSCN MKR DOCD: CPT | Performed by: PSYCHIATRY & NEUROLOGY

## 2022-09-21 PROCEDURE — G8427 DOCREV CUR MEDS BY ELIG CLIN: HCPCS | Performed by: PSYCHIATRY & NEUROLOGY

## 2022-09-21 PROCEDURE — 1101F PT FALLS ASSESS-DOCD LE1/YR: CPT | Performed by: PSYCHIATRY & NEUROLOGY

## 2022-09-21 PROCEDURE — G8536 NO DOC ELDER MAL SCRN: HCPCS | Performed by: PSYCHIATRY & NEUROLOGY

## 2022-09-21 PROCEDURE — 1090F PRES/ABSN URINE INCON ASSESS: CPT | Performed by: PSYCHIATRY & NEUROLOGY

## 2022-09-21 PROCEDURE — G8420 CALC BMI NORM PARAMETERS: HCPCS | Performed by: PSYCHIATRY & NEUROLOGY

## 2022-09-21 RX ORDER — SERTRALINE HYDROCHLORIDE 25 MG/1
25 TABLET, FILM COATED ORAL DAILY
Qty: 30 TABLET | Refills: 11 | Status: SHIPPED | OUTPATIENT
Start: 2022-09-21

## 2022-09-21 RX ORDER — MEMANTINE HYDROCHLORIDE 5 MG/1
5 TABLET ORAL 2 TIMES DAILY
Qty: 60 TABLET | Refills: 11 | Status: SHIPPED | OUTPATIENT
Start: 2022-09-21

## 2022-09-21 RX ORDER — RISPERIDONE 1 MG/1
TABLET, FILM COATED ORAL
Qty: 60 TABLET | Refills: 11 | Status: SHIPPED | OUTPATIENT
Start: 2022-09-21

## 2022-09-21 NOTE — PROGRESS NOTES
[facility] and [address] at [facility] at [address] [facility], [address] [facility] [address] [facility], [address] [place] [address]   [phone]              Chief Complaint   Patient presents with    Dementia     No changes per son     Current Outpatient Medications   Medication Sig Dispense Refill    atorvastatin (LIPITOR) 80 mg tablet TAKE 1 TAB BY MOUTH AT BEDTIME FOR CHOLESTEROL      acetaminophen (TYLENOL) 325 mg tablet Take 2 Tablets by mouth every four (4) hours as needed for Pain or Fever. 30 Tablet 0    tamsulosin (FLOMAX) 0.4 mg capsule Take 1 Capsule by mouth daily. 30 Capsule 2    risperiDONE (RisperDAL) 1 mg tablet TAKE 1 TAB BY MOUTH TWO TIMES A DAY. 60 Tablet 11    memantine (NAMENDA) 5 mg tablet Take 1 Tablet by mouth two (2) times a day. 60 Tablet 11    sertraline (ZOLOFT) 25 mg tablet Take 1 Tablet by mouth daily. 30 Tablet 11    midodrine (PROAMATINE) 5 mg tablet TAKE 1 TAB BY MOUTH 2 TIMES DAILY AS NEEDED SYSTOLIC BLOOD PRESSURE LESS THAN 110 (Patient not taking: Reported on 9/21/2022)        Allergies   Allergen Reactions    Omeprazole Angioedema     Social History     Tobacco Use    Smoking status: Never    Smokeless tobacco: Never   Vaping Use    Vaping Use: Never used   Substance Use Topics    Alcohol use: No    Drug use: Never   72-year-old lady who returns today companied by her son for follow-up dementiaWith psychosis. We saw her last in September of last year. We continued Namenda Risperdal and Zoloft. Record review finds that she was admitted to [facility] in March of this year and she was seen by nurse there. She was being evaluated for C-spine process. She had MRI of the cervical spine demonstrating central stenosis with cord compression C3-C4 review of Dr. Mignon Calixto note from neurosurgery finds she also had an MRI of the brain with embolic appearing strokes.   Risk of surgery was to be discussed with the family and on a subsequent note with Dr. Jori Figueroa surgical intervention was declined. Today her son is with her and recounts history. He recounts the history as noted above. She has been doing fairly well although progressively getting worse and her memory. She is now living with them. She is in a wheelchair now. His wife is doing her ADLs and they are looking for some assistance at home. At night she seems to be very restless. No hallucinations. Examination  Visit Vitals  BP (!) 161/70 (BP 1 Location: Left upper arm, BP Patient Position: Sitting, BP Cuff Size: Adult)   Pulse 70   Resp 16   SpO2 98%   She is a pleasant elderly lady in a wheelchair today. She appears to be very appropriately cared for. She is awake alert conversant and interactive. She does not know the month. She does know Iris Knows is president. When I give her a clue she says Dewey Michael was the previous president. She says were on the third floor but were on the second. She calculates coins. She follows commands. She has no pronation or drift. She resists symmetrically in all extremities. Reflexes brisk right versus left upper and lower    Impression/Plan  Progressive dementia with psychosis  Status post embolic strokes  Cervical stenosis with cord compression  Continue with Namenda  Continue Zoloft  Continue Risperdal  Try some low-dose melatonin 3-6 mg in the evening  Will get in contact with the [employer] to try to get them some assistance/resources  Modify modifiable risk factors for stroke including pushing LDL below 70  911 for stroke symptoms  Follow-up 1 year unless needed sooner    Bari To MD        This note was created using voice recognition software. Despite editing, there may be syntax errors.

## 2023-03-27 ENCOUNTER — TRANSCRIBE ORDER (OUTPATIENT)
Dept: GENERAL RADIOLOGY | Age: 88
End: 2023-03-27

## 2023-03-27 ENCOUNTER — HOSPITAL ENCOUNTER (OUTPATIENT)
Dept: GENERAL RADIOLOGY | Age: 88
Discharge: HOME | End: 2023-03-27
Attending: FAMILY MEDICINE
Payer: MEDICARE

## 2023-03-27 DIAGNOSIS — R05.9 COUGH: Primary | ICD-10-CM

## 2023-03-27 DIAGNOSIS — R05.9 COUGH: ICD-10-CM

## 2023-03-27 PROCEDURE — 71046 X-RAY EXAM CHEST 2 VIEWS: CPT

## 2023-06-14 ENCOUNTER — APPOINTMENT (OUTPATIENT)
Facility: HOSPITAL | Age: 88
DRG: 270 | End: 2023-06-14
Payer: MEDICARE

## 2023-06-14 ENCOUNTER — HOSPITAL ENCOUNTER (INPATIENT)
Facility: HOSPITAL | Age: 88
LOS: 11 days | Discharge: HOME | DRG: 270 | End: 2023-06-25
Attending: STUDENT IN AN ORGANIZED HEALTH CARE EDUCATION/TRAINING PROGRAM | Admitting: INTERNAL MEDICINE
Payer: MEDICARE

## 2023-06-14 DIAGNOSIS — R05.3 CHRONIC COUGH: ICD-10-CM

## 2023-06-14 DIAGNOSIS — K52.9 COLITIS: ICD-10-CM

## 2023-06-14 DIAGNOSIS — N30.00 ACUTE CYSTITIS WITHOUT HEMATURIA: ICD-10-CM

## 2023-06-14 DIAGNOSIS — I71.40 ABDOMINAL AORTIC ANEURYSM (AAA) WITHOUT RUPTURE, UNSPECIFIED PART (HCC): Primary | ICD-10-CM

## 2023-06-14 LAB
ALBUMIN SERPL-MCNC: 3.5 G/DL (ref 3.5–5)
ALBUMIN SERPL-MCNC: 3.5 G/DL (ref 3.5–5)
ALBUMIN SERPL-MCNC: 3.6 G/DL (ref 3.5–5)
ALBUMIN/GLOB SERPL: 1 (ref 1.1–2.2)
ALBUMIN/GLOB SERPL: 1 (ref 1.1–2.2)
ALP SERPL-CCNC: 112 U/L (ref 45–117)
ALP SERPL-CCNC: 114 U/L (ref 45–117)
ALT SERPL-CCNC: 21 U/L (ref 12–78)
ALT SERPL-CCNC: 21 U/L (ref 12–78)
ANION GAP SERPL CALC-SCNC: 4 MMOL/L (ref 5–15)
ANION GAP SERPL CALC-SCNC: 8 MMOL/L (ref 5–15)
APPEARANCE UR: ABNORMAL
AST SERPL-CCNC: 17 U/L (ref 15–37)
AST SERPL-CCNC: 21 U/L (ref 15–37)
BACTERIA URNS QL MICRO: ABNORMAL /HPF
BASOPHILS # BLD: 0.1 K/UL (ref 0–0.1)
BASOPHILS NFR BLD: 1 % (ref 0–1)
BILIRUB DIRECT SERPL-MCNC: 0.1 MG/DL (ref 0–0.2)
BILIRUB SERPL-MCNC: 0.5 MG/DL (ref 0.2–1)
BILIRUB SERPL-MCNC: 0.5 MG/DL (ref 0.2–1)
BILIRUB UR QL: NEGATIVE
BUN SERPL-MCNC: 16 MG/DL (ref 6–20)
BUN SERPL-MCNC: 20 MG/DL (ref 6–20)
BUN/CREAT SERPL: 22 (ref 12–20)
BUN/CREAT SERPL: 23 (ref 12–20)
CALCIUM SERPL-MCNC: 9.1 MG/DL (ref 8.5–10.1)
CALCIUM SERPL-MCNC: 9.2 MG/DL (ref 8.5–10.1)
CHLORIDE SERPL-SCNC: 103 MMOL/L (ref 97–108)
CHLORIDE SERPL-SCNC: 99 MMOL/L (ref 97–108)
CO2 SERPL-SCNC: 26 MMOL/L (ref 21–32)
CO2 SERPL-SCNC: 29 MMOL/L (ref 21–32)
COLOR UR: ABNORMAL
COMMENT:: NORMAL
CREAT SERPL-MCNC: 0.71 MG/DL (ref 0.55–1.02)
CREAT SERPL-MCNC: 0.89 MG/DL (ref 0.55–1.02)
DIFFERENTIAL METHOD BLD: ABNORMAL
EOSINOPHIL # BLD: 1.7 K/UL (ref 0–0.4)
EOSINOPHIL NFR BLD: 16 % (ref 0–7)
EPITH CASTS URNS QL MICRO: ABNORMAL /LPF
ERYTHROCYTE [DISTWIDTH] IN BLOOD BY AUTOMATED COUNT: 13.4 % (ref 11.5–14.5)
GLOBULIN SER CALC-MCNC: 3.6 G/DL (ref 2–4)
GLOBULIN SER CALC-MCNC: 3.7 G/DL (ref 2–4)
GLUCOSE SERPL-MCNC: 157 MG/DL (ref 65–100)
GLUCOSE SERPL-MCNC: 96 MG/DL (ref 65–100)
GLUCOSE UR STRIP.AUTO-MCNC: NEGATIVE MG/DL
HCT VFR BLD AUTO: 35.1 % (ref 35–47)
HGB BLD-MCNC: 11.5 G/DL (ref 11.5–16)
HGB UR QL STRIP: ABNORMAL
IMM GRANULOCYTES # BLD AUTO: 0 K/UL (ref 0–0.04)
IMM GRANULOCYTES NFR BLD AUTO: 0 % (ref 0–0.5)
INR PPP: 1.1 (ref 0.9–1.1)
KETONES UR QL STRIP.AUTO: NEGATIVE MG/DL
LACTATE SERPL-SCNC: 1.5 MMOL/L (ref 0.4–2)
LEUKOCYTE ESTERASE UR QL STRIP.AUTO: ABNORMAL
LIPASE SERPL-CCNC: 121 U/L (ref 73–393)
LYMPHOCYTES # BLD: 2.5 K/UL (ref 0.8–3.5)
LYMPHOCYTES NFR BLD: 24 % (ref 12–49)
MAGNESIUM SERPL-MCNC: 2 MG/DL (ref 1.6–2.4)
MCH RBC QN AUTO: 29.3 PG (ref 26–34)
MCHC RBC AUTO-ENTMCNC: 32.8 G/DL (ref 30–36.5)
MCV RBC AUTO: 89.3 FL (ref 80–99)
MONOCYTES # BLD: 0.6 K/UL (ref 0–1)
MONOCYTES NFR BLD: 6 % (ref 5–13)
NEUTS SEG # BLD: 5.5 K/UL (ref 1.8–8)
NEUTS SEG NFR BLD: 53 % (ref 32–75)
NITRITE UR QL STRIP.AUTO: POSITIVE
NRBC # BLD: 0 K/UL (ref 0–0.01)
NRBC BLD-RTO: 0 PER 100 WBC
NT PRO BNP: 2000 PG/ML
PH UR STRIP: 7 (ref 5–8)
PHOSPHATE SERPL-MCNC: 2.9 MG/DL (ref 2.6–4.7)
PLATELET # BLD AUTO: 168 K/UL (ref 150–400)
PMV BLD AUTO: 10.7 FL (ref 8.9–12.9)
POTASSIUM SERPL-SCNC: 3.9 MMOL/L (ref 3.5–5.1)
POTASSIUM SERPL-SCNC: 4.1 MMOL/L (ref 3.5–5.1)
PROCALCITONIN SERPL-MCNC: <0.05 NG/ML
PROT SERPL-MCNC: 7.1 G/DL (ref 6.4–8.2)
PROT SERPL-MCNC: 7.3 G/DL (ref 6.4–8.2)
PROT UR STRIP-MCNC: ABNORMAL MG/DL
PROTHROMBIN TIME: 11.9 SEC (ref 9–11.1)
RBC # BLD AUTO: 3.93 M/UL (ref 3.8–5.2)
RBC #/AREA URNS HPF: ABNORMAL /HPF (ref 0–5)
RBC MORPH BLD: ABNORMAL
SODIUM SERPL-SCNC: 133 MMOL/L (ref 136–145)
SODIUM SERPL-SCNC: 136 MMOL/L (ref 136–145)
SP GR UR REFRACTOMETRY: 1.01 (ref 1–1.03)
SPECIMEN HOLD: NORMAL
URINE CULTURE IF INDICATED: ABNORMAL
UROBILINOGEN UR QL STRIP.AUTO: 1 EU/DL (ref 0.2–1)
WBC # BLD AUTO: 10.4 K/UL (ref 3.6–11)
WBC URNS QL MICRO: ABNORMAL /HPF (ref 0–4)

## 2023-06-14 PROCEDURE — 83690 ASSAY OF LIPASE: CPT

## 2023-06-14 PROCEDURE — 6360000004 HC RX CONTRAST MEDICATION: Performed by: STUDENT IN AN ORGANIZED HEALTH CARE EDUCATION/TRAINING PROGRAM

## 2023-06-14 PROCEDURE — 80076 HEPATIC FUNCTION PANEL: CPT

## 2023-06-14 PROCEDURE — 87186 SC STD MICRODIL/AGAR DIL: CPT

## 2023-06-14 PROCEDURE — 85025 COMPLETE CBC W/AUTO DIFF WBC: CPT

## 2023-06-14 PROCEDURE — 2500000003 HC RX 250 WO HCPCS: Performed by: STUDENT IN AN ORGANIZED HEALTH CARE EDUCATION/TRAINING PROGRAM

## 2023-06-14 PROCEDURE — 6360000002 HC RX W HCPCS: Performed by: STUDENT IN AN ORGANIZED HEALTH CARE EDUCATION/TRAINING PROGRAM

## 2023-06-14 PROCEDURE — 83735 ASSAY OF MAGNESIUM: CPT

## 2023-06-14 PROCEDURE — 36415 COLL VENOUS BLD VENIPUNCTURE: CPT

## 2023-06-14 PROCEDURE — 87077 CULTURE AEROBIC IDENTIFY: CPT

## 2023-06-14 PROCEDURE — 96365 THER/PROPH/DIAG IV INF INIT: CPT

## 2023-06-14 PROCEDURE — 85610 PROTHROMBIN TIME: CPT

## 2023-06-14 PROCEDURE — 84145 PROCALCITONIN (PCT): CPT

## 2023-06-14 PROCEDURE — 81001 URINALYSIS AUTO W/SCOPE: CPT

## 2023-06-14 PROCEDURE — 6370000000 HC RX 637 (ALT 250 FOR IP): Performed by: INTERNAL MEDICINE

## 2023-06-14 PROCEDURE — 74177 CT ABD & PELVIS W/CONTRAST: CPT

## 2023-06-14 PROCEDURE — 83880 ASSAY OF NATRIURETIC PEPTIDE: CPT

## 2023-06-14 PROCEDURE — 99285 EMERGENCY DEPT VISIT HI MDM: CPT

## 2023-06-14 PROCEDURE — 71046 X-RAY EXAM CHEST 2 VIEWS: CPT

## 2023-06-14 PROCEDURE — 87040 BLOOD CULTURE FOR BACTERIA: CPT

## 2023-06-14 PROCEDURE — 2580000003 HC RX 258: Performed by: INTERNAL MEDICINE

## 2023-06-14 PROCEDURE — 94761 N-INVAS EAR/PLS OXIMETRY MLT: CPT

## 2023-06-14 PROCEDURE — 1100000000 HC RM PRIVATE

## 2023-06-14 PROCEDURE — 80053 COMPREHEN METABOLIC PANEL: CPT

## 2023-06-14 PROCEDURE — 83605 ASSAY OF LACTIC ACID: CPT

## 2023-06-14 PROCEDURE — 6360000002 HC RX W HCPCS: Performed by: INTERNAL MEDICINE

## 2023-06-14 PROCEDURE — 80069 RENAL FUNCTION PANEL: CPT

## 2023-06-14 PROCEDURE — 87086 URINE CULTURE/COLONY COUNT: CPT

## 2023-06-14 RX ORDER — ACETAMINOPHEN 650 MG/1
650 SUPPOSITORY RECTAL EVERY 6 HOURS PRN
Status: DISCONTINUED | OUTPATIENT
Start: 2023-06-14 | End: 2023-06-25 | Stop reason: HOSPADM

## 2023-06-14 RX ORDER — ACETAMINOPHEN 325 MG/1
650 TABLET ORAL EVERY 6 HOURS PRN
Status: DISCONTINUED | OUTPATIENT
Start: 2023-06-14 | End: 2023-06-25 | Stop reason: HOSPADM

## 2023-06-14 RX ORDER — SODIUM CHLORIDE 0.9 % (FLUSH) 0.9 %
5-40 SYRINGE (ML) INJECTION PRN
Status: DISCONTINUED | OUTPATIENT
Start: 2023-06-14 | End: 2023-06-21

## 2023-06-14 RX ORDER — ENOXAPARIN SODIUM 100 MG/ML
40 INJECTION SUBCUTANEOUS DAILY
Status: DISCONTINUED | OUTPATIENT
Start: 2023-06-14 | End: 2023-06-25 | Stop reason: HOSPADM

## 2023-06-14 RX ORDER — LOSARTAN POTASSIUM AND HYDROCHLOROTHIAZIDE 12.5; 5 MG/1; MG/1
1 TABLET ORAL DAILY
Status: ON HOLD | COMMUNITY
End: 2023-06-25 | Stop reason: HOSPADM

## 2023-06-14 RX ORDER — SODIUM CHLORIDE 0.9 % (FLUSH) 0.9 %
5-40 SYRINGE (ML) INJECTION EVERY 12 HOURS SCHEDULED
Status: DISCONTINUED | OUTPATIENT
Start: 2023-06-14 | End: 2023-06-25 | Stop reason: HOSPADM

## 2023-06-14 RX ORDER — METRONIDAZOLE 500 MG/100ML
500 INJECTION, SOLUTION INTRAVENOUS EVERY 8 HOURS
Status: DISCONTINUED | OUTPATIENT
Start: 2023-06-14 | End: 2023-06-23

## 2023-06-14 RX ORDER — SODIUM CHLORIDE 9 MG/ML
INJECTION, SOLUTION INTRAVENOUS PRN
Status: DISCONTINUED | OUTPATIENT
Start: 2023-06-14 | End: 2023-06-25 | Stop reason: HOSPADM

## 2023-06-14 RX ORDER — POLYETHYLENE GLYCOL 3350 17 G/17G
17 POWDER, FOR SOLUTION ORAL DAILY PRN
Status: DISCONTINUED | OUTPATIENT
Start: 2023-06-14 | End: 2023-06-25 | Stop reason: HOSPADM

## 2023-06-14 RX ORDER — POLYETHYLENE GLYCOL 3350 17 G/17G
17 POWDER, FOR SOLUTION ORAL DAILY PRN
Status: DISCONTINUED | OUTPATIENT
Start: 2023-06-14 | End: 2023-06-21

## 2023-06-14 RX ORDER — SODIUM CHLORIDE 0.9 % (FLUSH) 0.9 %
5-40 SYRINGE (ML) INJECTION EVERY 12 HOURS SCHEDULED
Status: DISCONTINUED | OUTPATIENT
Start: 2023-06-14 | End: 2023-06-21

## 2023-06-14 RX ORDER — LEVOFLOXACIN 5 MG/ML
750 INJECTION, SOLUTION INTRAVENOUS
Status: DISCONTINUED | OUTPATIENT
Start: 2023-06-14 | End: 2023-06-14

## 2023-06-14 RX ORDER — ONDANSETRON 2 MG/ML
4 INJECTION INTRAMUSCULAR; INTRAVENOUS EVERY 6 HOURS PRN
Status: DISCONTINUED | OUTPATIENT
Start: 2023-06-14 | End: 2023-06-25 | Stop reason: HOSPADM

## 2023-06-14 RX ORDER — ERGOCALCIFEROL 1.25 MG/1
50000 CAPSULE ORAL WEEKLY
COMMUNITY

## 2023-06-14 RX ORDER — ONDANSETRON 4 MG/1
4 TABLET, ORALLY DISINTEGRATING ORAL EVERY 8 HOURS PRN
Status: DISCONTINUED | OUTPATIENT
Start: 2023-06-14 | End: 2023-06-25 | Stop reason: HOSPADM

## 2023-06-14 RX ORDER — SODIUM CHLORIDE 0.9 % (FLUSH) 0.9 %
5-40 SYRINGE (ML) INJECTION PRN
Status: DISCONTINUED | OUTPATIENT
Start: 2023-06-14 | End: 2023-06-25 | Stop reason: HOSPADM

## 2023-06-14 RX ADMIN — WATER 1000 MG: 1 INJECTION INTRAMUSCULAR; INTRAVENOUS; SUBCUTANEOUS at 18:41

## 2023-06-14 RX ADMIN — SODIUM CHLORIDE, PRESERVATIVE FREE 10 ML: 5 INJECTION INTRAVENOUS at 21:03

## 2023-06-14 RX ADMIN — METRONIDAZOLE 500 MG: 500 INJECTION, SOLUTION INTRAVENOUS at 14:27

## 2023-06-14 RX ADMIN — METRONIDAZOLE 500 MG: 500 INJECTION, SOLUTION INTRAVENOUS at 22:15

## 2023-06-14 RX ADMIN — LEVOFLOXACIN 750 MG: 5 INJECTION, SOLUTION INTRAVENOUS at 15:49

## 2023-06-14 RX ADMIN — ACETAMINOPHEN 650 MG: 325 TABLET ORAL at 22:14

## 2023-06-14 RX ADMIN — IOPAMIDOL 100 ML: 755 INJECTION, SOLUTION INTRAVENOUS at 13:32

## 2023-06-14 RX ADMIN — ENOXAPARIN SODIUM 40 MG: 40 INJECTION SUBCUTANEOUS at 18:42

## 2023-06-14 ASSESSMENT — LIFESTYLE VARIABLES
HOW OFTEN DO YOU HAVE A DRINK CONTAINING ALCOHOL: NEVER
HOW MANY STANDARD DRINKS CONTAINING ALCOHOL DO YOU HAVE ON A TYPICAL DAY: PATIENT DOES NOT DRINK

## 2023-06-14 ASSESSMENT — PAIN DESCRIPTION - ORIENTATION: ORIENTATION: LEFT

## 2023-06-14 ASSESSMENT — PAIN DESCRIPTION - DESCRIPTORS: DESCRIPTORS: ACHING

## 2023-06-14 ASSESSMENT — PAIN SCALES - GENERAL: PAINLEVEL_OUTOF10: 5

## 2023-06-14 ASSESSMENT — PAIN DESCRIPTION - LOCATION: LOCATION: LEG

## 2023-06-15 ENCOUNTER — APPOINTMENT (OUTPATIENT)
Facility: HOSPITAL | Age: 88
DRG: 270 | End: 2023-06-15
Attending: INTERNAL MEDICINE
Payer: MEDICARE

## 2023-06-15 LAB
ANION GAP SERPL CALC-SCNC: 7 MMOL/L (ref 5–15)
BASOPHILS # BLD: 0.1 K/UL (ref 0–0.1)
BASOPHILS NFR BLD: 1 % (ref 0–1)
BUN SERPL-MCNC: 16 MG/DL (ref 6–20)
BUN/CREAT SERPL: 21 (ref 12–20)
CALCIUM SERPL-MCNC: 8.5 MG/DL (ref 8.5–10.1)
CHLORIDE SERPL-SCNC: 102 MMOL/L (ref 97–108)
CO2 SERPL-SCNC: 26 MMOL/L (ref 21–32)
CREAT SERPL-MCNC: 0.78 MG/DL (ref 0.55–1.02)
DIFFERENTIAL METHOD BLD: ABNORMAL
ECHO AO ARCH DIAM: 2.7 CM
ECHO AO ASC DIAM: 3.6 CM
ECHO AO ASCENDING AORTA INDEX: 2.13 CM/M2
ECHO AV AREA PEAK VELOCITY: 1.3 CM2
ECHO AV AREA VTI: 1.5 CM2
ECHO AV AREA/BSA PEAK VELOCITY: 0.8 CM2/M2
ECHO AV AREA/BSA VTI: 0.9 CM2/M2
ECHO AV MEAN GRADIENT: 10 MMHG
ECHO AV MEAN VELOCITY: 1.5 M/S
ECHO AV PEAK GRADIENT: 22 MMHG
ECHO AV PEAK VELOCITY: 2.4 M/S
ECHO AV VELOCITY RATIO: 0.46
ECHO AV VTI: 53.2 CM
ECHO BSA: 1.73 M2
ECHO EST RA PRESSURE: 10 MMHG
ECHO LA DIAMETER INDEX: 2.49 CM/M2
ECHO LA DIAMETER: 4.2 CM
ECHO LA VOL 2C: 74 ML (ref 22–52)
ECHO LA VOL 2C: 78 ML (ref 22–52)
ECHO LA VOL 4C: 49 ML (ref 22–52)
ECHO LA VOL 4C: 52 ML (ref 22–52)
ECHO LA VOL BP: 60 ML (ref 22–52)
ECHO LA VOL/BSA BIPLANE: 36 ML/M2 (ref 16–34)
ECHO LA VOLUME AREA LENGTH: 64 ML
ECHO LA VOLUME INDEX AREA LENGTH: 38 ML/M2 (ref 16–34)
ECHO LV E' LATERAL VELOCITY: 6 CM/S
ECHO LV E' SEPTAL VELOCITY: 5 CM/S
ECHO LV EDV A2C: 62 ML
ECHO LV EDV A4C: 60 ML
ECHO LV EDV BP: 62 ML (ref 56–104)
ECHO LV EDV INDEX A4C: 36 ML/M2
ECHO LV EDV INDEX BP: 37 ML/M2
ECHO LV EDV NDEX A2C: 37 ML/M2
ECHO LV EJECTION FRACTION A2C: 87 %
ECHO LV EJECTION FRACTION A4C: 84 %
ECHO LV EJECTION FRACTION BIPLANE: 86 % (ref 55–100)
ECHO LV ESV A2C: 8 ML
ECHO LV ESV A4C: 10 ML
ECHO LV ESV BP: 9 ML (ref 19–49)
ECHO LV ESV INDEX A2C: 5 ML/M2
ECHO LV ESV INDEX A4C: 6 ML/M2
ECHO LV ESV INDEX BP: 5 ML/M2
ECHO LV FRACTIONAL SHORTENING: 46 % (ref 28–44)
ECHO LV INTERNAL DIMENSION DIASTOLE INDEX: 2.43 CM/M2
ECHO LV INTERNAL DIMENSION DIASTOLIC: 4.1 CM (ref 3.9–5.3)
ECHO LV INTERNAL DIMENSION SYSTOLIC INDEX: 1.3 CM/M2
ECHO LV INTERNAL DIMENSION SYSTOLIC: 2.2 CM
ECHO LV IVSD: 1.4 CM (ref 0.6–0.9)
ECHO LV MASS 2D: 171.7 G (ref 67–162)
ECHO LV MASS INDEX 2D: 101.6 G/M2 (ref 43–95)
ECHO LV POSTERIOR WALL DIASTOLIC: 1 CM (ref 0.6–0.9)
ECHO LV RELATIVE WALL THICKNESS RATIO: 0.49
ECHO LVOT AREA: 2.8 CM2
ECHO LVOT AV VTI INDEX: 0.52
ECHO LVOT DIAM: 1.9 CM
ECHO LVOT MEAN GRADIENT: 3 MMHG
ECHO LVOT PEAK GRADIENT: 5 MMHG
ECHO LVOT PEAK VELOCITY: 1.1 M/S
ECHO LVOT STROKE VOLUME INDEX: 46.6 ML/M2
ECHO LVOT SV: 78.8 ML
ECHO LVOT VTI: 27.8 CM
ECHO MV A VELOCITY: 0.97 M/S
ECHO MV AREA PHT: 3.4 CM2
ECHO MV AREA VTI: 1.8 CM2
ECHO MV E DECELERATION TIME (DT): 183.2 MS
ECHO MV E VELOCITY: 1.68 M/S
ECHO MV E/A RATIO: 1.73
ECHO MV E/E' LATERAL: 28
ECHO MV E/E' RATIO (AVERAGED): 30.8
ECHO MV E/E' SEPTAL: 33.6
ECHO MV LVOT VTI INDEX: 1.55
ECHO MV MAX VELOCITY: 1.8 M/S
ECHO MV MEAN GRADIENT: 3 MMHG
ECHO MV MEAN VELOCITY: 0.8 M/S
ECHO MV PEAK GRADIENT: 13 MMHG
ECHO MV PRESSURE HALF TIME (PHT): 64.9 MS
ECHO MV REGURGITANT ALIASING (NYQUIST) VELOCITY: 32 CM/S
ECHO MV REGURGITANT PEAK GRADIENT: 144 MMHG
ECHO MV REGURGITANT PEAK VELOCITY: 6 M/S
ECHO MV REGURGITANT VELOCITY PISA: 6.2 M/S
ECHO MV REGURGITANT VTIA: 211.7 CM
ECHO MV VTI: 43 CM
ECHO PULMONARY ARTERY END DIASTOLIC PRESSURE: 5 MMHG
ECHO PV MAX VELOCITY: 0.8 M/S
ECHO PV PEAK GRADIENT: 3 MMHG
ECHO PV REGURGITANT MAX VELOCITY: 1.1 M/S
ECHO RIGHT VENTRICULAR SYSTOLIC PRESSURE (RVSP): 48 MMHG
ECHO RV FREE WALL PEAK S': 12 CM/S
ECHO RV INTERNAL DIMENSION: 3.7 CM
ECHO RV TAPSE: 1.9 CM (ref 1.7–?)
ECHO TV REGURGITANT MAX VELOCITY: 3.09 M/S
ECHO TV REGURGITANT PEAK GRADIENT: 39 MMHG
EOSINOPHIL # BLD: 1.3 K/UL (ref 0–0.4)
EOSINOPHIL NFR BLD: 12 % (ref 0–7)
ERYTHROCYTE [DISTWIDTH] IN BLOOD BY AUTOMATED COUNT: 13.4 % (ref 11.5–14.5)
GLUCOSE SERPL-MCNC: 105 MG/DL (ref 65–100)
HCT VFR BLD AUTO: 29.4 % (ref 35–47)
HGB BLD-MCNC: 10.1 G/DL (ref 11.5–16)
IMM GRANULOCYTES # BLD AUTO: 0 K/UL (ref 0–0.04)
IMM GRANULOCYTES NFR BLD AUTO: 0 % (ref 0–0.5)
LYMPHOCYTES # BLD: 2.4 K/UL (ref 0.8–3.5)
LYMPHOCYTES NFR BLD: 22 % (ref 12–49)
MCH RBC QN AUTO: 29.3 PG (ref 26–34)
MCHC RBC AUTO-ENTMCNC: 34.4 G/DL (ref 30–36.5)
MCV RBC AUTO: 85.2 FL (ref 80–99)
MONOCYTES # BLD: 0.9 K/UL (ref 0–1)
MONOCYTES NFR BLD: 8 % (ref 5–13)
NEUTS SEG # BLD: 6.2 K/UL (ref 1.8–8)
NEUTS SEG NFR BLD: 57 % (ref 32–75)
NRBC # BLD: 0 K/UL (ref 0–0.01)
NRBC BLD-RTO: 0 PER 100 WBC
PHOSPHATE SERPL-MCNC: 2.6 MG/DL (ref 2.6–4.7)
PLATELET # BLD AUTO: 142 K/UL (ref 150–400)
PMV BLD AUTO: 10.1 FL (ref 8.9–12.9)
POTASSIUM SERPL-SCNC: 3.6 MMOL/L (ref 3.5–5.1)
RBC # BLD AUTO: 3.45 M/UL (ref 3.8–5.2)
RBC MORPH BLD: ABNORMAL
SODIUM SERPL-SCNC: 135 MMOL/L (ref 136–145)
WBC # BLD AUTO: 10.9 K/UL (ref 3.6–11)

## 2023-06-15 PROCEDURE — 1100000000 HC RM PRIVATE

## 2023-06-15 PROCEDURE — 97530 THERAPEUTIC ACTIVITIES: CPT

## 2023-06-15 PROCEDURE — 97116 GAIT TRAINING THERAPY: CPT

## 2023-06-15 PROCEDURE — 85025 COMPLETE CBC W/AUTO DIFF WBC: CPT

## 2023-06-15 PROCEDURE — 97165 OT EVAL LOW COMPLEX 30 MIN: CPT

## 2023-06-15 PROCEDURE — 84100 ASSAY OF PHOSPHORUS: CPT

## 2023-06-15 PROCEDURE — 6370000000 HC RX 637 (ALT 250 FOR IP): Performed by: INTERNAL MEDICINE

## 2023-06-15 PROCEDURE — 2580000003 HC RX 258: Performed by: INTERNAL MEDICINE

## 2023-06-15 PROCEDURE — 2500000003 HC RX 250 WO HCPCS: Performed by: STUDENT IN AN ORGANIZED HEALTH CARE EDUCATION/TRAINING PROGRAM

## 2023-06-15 PROCEDURE — 97162 PT EVAL MOD COMPLEX 30 MIN: CPT

## 2023-06-15 PROCEDURE — 93306 TTE W/DOPPLER COMPLETE: CPT

## 2023-06-15 PROCEDURE — 6370000000 HC RX 637 (ALT 250 FOR IP): Performed by: NURSE PRACTITIONER

## 2023-06-15 PROCEDURE — 6360000002 HC RX W HCPCS: Performed by: INTERNAL MEDICINE

## 2023-06-15 PROCEDURE — 94761 N-INVAS EAR/PLS OXIMETRY MLT: CPT

## 2023-06-15 PROCEDURE — 80048 BASIC METABOLIC PNL TOTAL CA: CPT

## 2023-06-15 PROCEDURE — 6370000000 HC RX 637 (ALT 250 FOR IP)

## 2023-06-15 PROCEDURE — 36415 COLL VENOUS BLD VENIPUNCTURE: CPT

## 2023-06-15 RX ORDER — ATORVASTATIN CALCIUM 20 MG/1
80 TABLET, FILM COATED ORAL NIGHTLY
Status: DISCONTINUED | OUTPATIENT
Start: 2023-06-15 | End: 2023-06-25 | Stop reason: HOSPADM

## 2023-06-15 RX ORDER — TAMSULOSIN HYDROCHLORIDE 0.4 MG/1
0.4 CAPSULE ORAL DAILY
Status: DISCONTINUED | OUTPATIENT
Start: 2023-06-15 | End: 2023-06-25 | Stop reason: HOSPADM

## 2023-06-15 RX ORDER — LOSARTAN POTASSIUM 50 MG/1
50 TABLET ORAL DAILY
Status: DISCONTINUED | OUTPATIENT
Start: 2023-06-15 | End: 2023-06-25 | Stop reason: HOSPADM

## 2023-06-15 RX ORDER — HYDROCHLOROTHIAZIDE 25 MG/1
12.5 TABLET ORAL DAILY
Status: DISCONTINUED | OUTPATIENT
Start: 2023-06-15 | End: 2023-06-18

## 2023-06-15 RX ORDER — MEMANTINE HYDROCHLORIDE 10 MG/1
5 TABLET ORAL 2 TIMES DAILY
Status: DISCONTINUED | OUTPATIENT
Start: 2023-06-15 | End: 2023-06-25 | Stop reason: HOSPADM

## 2023-06-15 RX ORDER — SERTRALINE HYDROCHLORIDE 25 MG/1
25 TABLET, FILM COATED ORAL DAILY
Status: DISCONTINUED | OUTPATIENT
Start: 2023-06-15 | End: 2023-06-25 | Stop reason: HOSPADM

## 2023-06-15 RX ORDER — OXYCODONE HYDROCHLORIDE 5 MG/1
2.5 TABLET ORAL EVERY 4 HOURS PRN
Status: DISCONTINUED | OUTPATIENT
Start: 2023-06-15 | End: 2023-06-25 | Stop reason: HOSPADM

## 2023-06-15 RX ORDER — RISPERIDONE 1 MG/1
1 TABLET ORAL 2 TIMES DAILY
Status: DISCONTINUED | OUTPATIENT
Start: 2023-06-15 | End: 2023-06-25 | Stop reason: HOSPADM

## 2023-06-15 RX ORDER — FUROSEMIDE 10 MG/ML
20 INJECTION INTRAMUSCULAR; INTRAVENOUS ONCE
Status: COMPLETED | OUTPATIENT
Start: 2023-06-15 | End: 2023-06-15

## 2023-06-15 RX ORDER — ERGOCALCIFEROL 1.25 MG/1
50000 CAPSULE ORAL WEEKLY
Status: DISCONTINUED | OUTPATIENT
Start: 2023-06-15 | End: 2023-06-25 | Stop reason: HOSPADM

## 2023-06-15 RX ORDER — LOSARTAN POTASSIUM AND HYDROCHLOROTHIAZIDE 12.5; 5 MG/1; MG/1
1 TABLET ORAL DAILY
Status: DISCONTINUED | OUTPATIENT
Start: 2023-06-15 | End: 2023-06-15

## 2023-06-15 RX ORDER — DICYCLOMINE HYDROCHLORIDE 10 MG/1
10 CAPSULE ORAL ONCE
Status: COMPLETED | OUTPATIENT
Start: 2023-06-15 | End: 2023-06-15

## 2023-06-15 RX ADMIN — METRONIDAZOLE 500 MG: 500 INJECTION, SOLUTION INTRAVENOUS at 06:04

## 2023-06-15 RX ADMIN — FUROSEMIDE 20 MG: 10 INJECTION, SOLUTION INTRAMUSCULAR; INTRAVENOUS at 10:10

## 2023-06-15 RX ADMIN — ACETAMINOPHEN 650 MG: 325 TABLET ORAL at 06:09

## 2023-06-15 RX ADMIN — RISPERIDONE 1 MG: 1 TABLET ORAL at 10:10

## 2023-06-15 RX ADMIN — SODIUM CHLORIDE, PRESERVATIVE FREE 10 ML: 5 INJECTION INTRAVENOUS at 21:12

## 2023-06-15 RX ADMIN — TAMSULOSIN HYDROCHLORIDE 0.4 MG: 0.4 CAPSULE ORAL at 10:10

## 2023-06-15 RX ADMIN — METRONIDAZOLE 500 MG: 500 INJECTION, SOLUTION INTRAVENOUS at 23:05

## 2023-06-15 RX ADMIN — HYDROCHLOROTHIAZIDE 12.5 MG: 25 TABLET ORAL at 10:09

## 2023-06-15 RX ADMIN — ERGOCALCIFEROL 50000 UNITS: 1.25 CAPSULE ORAL at 10:09

## 2023-06-15 RX ADMIN — DICYCLOMINE HYDROCHLORIDE 10 MG: 10 CAPSULE ORAL at 04:41

## 2023-06-15 RX ADMIN — SODIUM CHLORIDE, PRESERVATIVE FREE 10 ML: 5 INJECTION INTRAVENOUS at 08:19

## 2023-06-15 RX ADMIN — OXYCODONE HYDROCHLORIDE 2.5 MG: 5 TABLET ORAL at 21:11

## 2023-06-15 RX ADMIN — MEMANTINE 5 MG: 10 TABLET ORAL at 10:10

## 2023-06-15 RX ADMIN — MEMANTINE 5 MG: 10 TABLET ORAL at 21:11

## 2023-06-15 RX ADMIN — OXYCODONE HYDROCHLORIDE 2.5 MG: 5 TABLET ORAL at 15:17

## 2023-06-15 RX ADMIN — SODIUM CHLORIDE, PRESERVATIVE FREE 10 ML: 5 INJECTION INTRAVENOUS at 10:11

## 2023-06-15 RX ADMIN — SERTRALINE 25 MG: 25 TABLET, FILM COATED ORAL at 10:10

## 2023-06-15 RX ADMIN — METRONIDAZOLE 500 MG: 500 INJECTION, SOLUTION INTRAVENOUS at 15:12

## 2023-06-15 RX ADMIN — ENOXAPARIN SODIUM 40 MG: 40 INJECTION SUBCUTANEOUS at 08:19

## 2023-06-15 RX ADMIN — WATER 1000 MG: 1 INJECTION INTRAMUSCULAR; INTRAVENOUS; SUBCUTANEOUS at 17:30

## 2023-06-15 RX ADMIN — SODIUM CHLORIDE, PRESERVATIVE FREE 10 ML: 5 INJECTION INTRAVENOUS at 21:13

## 2023-06-15 RX ADMIN — LOSARTAN POTASSIUM 50 MG: 50 TABLET, FILM COATED ORAL at 10:10

## 2023-06-15 RX ADMIN — OXYCODONE HYDROCHLORIDE 2.5 MG: 5 TABLET ORAL at 10:11

## 2023-06-15 RX ADMIN — ATORVASTATIN CALCIUM 80 MG: 20 TABLET, FILM COATED ORAL at 21:11

## 2023-06-15 RX ADMIN — RISPERIDONE 1 MG: 1 TABLET ORAL at 21:11

## 2023-06-15 ASSESSMENT — PAIN DESCRIPTION - ORIENTATION
ORIENTATION: LEFT

## 2023-06-15 ASSESSMENT — PAIN SCALES - GENERAL
PAINLEVEL_OUTOF10: 7
PAINLEVEL_OUTOF10: 6
PAINLEVEL_OUTOF10: 7
PAINLEVEL_OUTOF10: 5
PAINLEVEL_OUTOF10: 8
PAINLEVEL_OUTOF10: 4

## 2023-06-15 ASSESSMENT — PAIN DESCRIPTION - DESCRIPTORS
DESCRIPTORS: ACHING

## 2023-06-15 ASSESSMENT — PAIN DESCRIPTION - LOCATION
LOCATION: LEG

## 2023-06-16 LAB
ANION GAP SERPL CALC-SCNC: 5 MMOL/L (ref 5–15)
BASOPHILS # BLD: 0.1 K/UL (ref 0–0.1)
BASOPHILS NFR BLD: 1 % (ref 0–1)
BUN SERPL-MCNC: 12 MG/DL (ref 6–20)
BUN/CREAT SERPL: 19 (ref 12–20)
CALCIUM SERPL-MCNC: 8.6 MG/DL (ref 8.5–10.1)
CHLORIDE SERPL-SCNC: 98 MMOL/L (ref 97–108)
CO2 SERPL-SCNC: 25 MMOL/L (ref 21–32)
CREAT SERPL-MCNC: 0.62 MG/DL (ref 0.55–1.02)
DIFFERENTIAL METHOD BLD: ABNORMAL
EOSINOPHIL # BLD: 1.7 K/UL (ref 0–0.4)
EOSINOPHIL NFR BLD: 17 % (ref 0–7)
ERYTHROCYTE [DISTWIDTH] IN BLOOD BY AUTOMATED COUNT: 13.3 % (ref 11.5–14.5)
GLUCOSE SERPL-MCNC: 100 MG/DL (ref 65–100)
HCT VFR BLD AUTO: 30.9 % (ref 35–47)
HGB BLD-MCNC: 10.7 G/DL (ref 11.5–16)
IMM GRANULOCYTES # BLD AUTO: 0 K/UL (ref 0–0.04)
IMM GRANULOCYTES NFR BLD AUTO: 0 % (ref 0–0.5)
LYMPHOCYTES # BLD: 1.9 K/UL (ref 0.8–3.5)
LYMPHOCYTES NFR BLD: 20 % (ref 12–49)
MAGNESIUM SERPL-MCNC: 1.7 MG/DL (ref 1.6–2.4)
MCH RBC QN AUTO: 29.2 PG (ref 26–34)
MCHC RBC AUTO-ENTMCNC: 34.6 G/DL (ref 30–36.5)
MCV RBC AUTO: 84.2 FL (ref 80–99)
MONOCYTES # BLD: 0.7 K/UL (ref 0–1)
MONOCYTES NFR BLD: 7 % (ref 5–13)
NEUTS SEG # BLD: 5.4 K/UL (ref 1.8–8)
NEUTS SEG NFR BLD: 55 % (ref 32–75)
NRBC # BLD: 0 K/UL (ref 0–0.01)
NRBC BLD-RTO: 0 PER 100 WBC
PHOSPHATE SERPL-MCNC: 2.6 MG/DL (ref 2.6–4.7)
PLATELET # BLD AUTO: 163 K/UL (ref 150–400)
PMV BLD AUTO: 10.5 FL (ref 8.9–12.9)
POTASSIUM SERPL-SCNC: 3.2 MMOL/L (ref 3.5–5.1)
RBC # BLD AUTO: 3.67 M/UL (ref 3.8–5.2)
SODIUM SERPL-SCNC: 128 MMOL/L (ref 136–145)
WBC # BLD AUTO: 9.8 K/UL (ref 3.6–11)

## 2023-06-16 PROCEDURE — 97530 THERAPEUTIC ACTIVITIES: CPT

## 2023-06-16 PROCEDURE — 2580000003 HC RX 258: Performed by: INTERNAL MEDICINE

## 2023-06-16 PROCEDURE — 1100000000 HC RM PRIVATE

## 2023-06-16 PROCEDURE — 6370000000 HC RX 637 (ALT 250 FOR IP): Performed by: INTERNAL MEDICINE

## 2023-06-16 PROCEDURE — 6360000002 HC RX W HCPCS: Performed by: INTERNAL MEDICINE

## 2023-06-16 PROCEDURE — 84100 ASSAY OF PHOSPHORUS: CPT

## 2023-06-16 PROCEDURE — 80048 BASIC METABOLIC PNL TOTAL CA: CPT

## 2023-06-16 PROCEDURE — 97116 GAIT TRAINING THERAPY: CPT

## 2023-06-16 PROCEDURE — 36415 COLL VENOUS BLD VENIPUNCTURE: CPT

## 2023-06-16 PROCEDURE — 6370000000 HC RX 637 (ALT 250 FOR IP)

## 2023-06-16 PROCEDURE — 99223 1ST HOSP IP/OBS HIGH 75: CPT | Performed by: INTERNAL MEDICINE

## 2023-06-16 PROCEDURE — 2500000003 HC RX 250 WO HCPCS: Performed by: STUDENT IN AN ORGANIZED HEALTH CARE EDUCATION/TRAINING PROGRAM

## 2023-06-16 PROCEDURE — 83735 ASSAY OF MAGNESIUM: CPT

## 2023-06-16 PROCEDURE — 85025 COMPLETE CBC W/AUTO DIFF WBC: CPT

## 2023-06-16 RX ORDER — POTASSIUM CHLORIDE 7.45 MG/ML
10 INJECTION INTRAVENOUS
Status: COMPLETED | OUTPATIENT
Start: 2023-06-16 | End: 2023-06-16

## 2023-06-16 RX ADMIN — SODIUM CHLORIDE, PRESERVATIVE FREE 10 ML: 5 INJECTION INTRAVENOUS at 21:05

## 2023-06-16 RX ADMIN — POTASSIUM CHLORIDE 10 MEQ: 7.45 INJECTION INTRAVENOUS at 12:44

## 2023-06-16 RX ADMIN — WATER 1000 MG: 1 INJECTION INTRAMUSCULAR; INTRAVENOUS; SUBCUTANEOUS at 18:07

## 2023-06-16 RX ADMIN — POTASSIUM CHLORIDE 10 MEQ: 7.45 INJECTION INTRAVENOUS at 15:27

## 2023-06-16 RX ADMIN — ENOXAPARIN SODIUM 40 MG: 40 INJECTION SUBCUTANEOUS at 08:40

## 2023-06-16 RX ADMIN — RISPERIDONE 1 MG: 1 TABLET ORAL at 21:04

## 2023-06-16 RX ADMIN — MEMANTINE 5 MG: 10 TABLET ORAL at 08:39

## 2023-06-16 RX ADMIN — SODIUM CHLORIDE, PRESERVATIVE FREE 10 ML: 5 INJECTION INTRAVENOUS at 08:40

## 2023-06-16 RX ADMIN — RISPERIDONE 1 MG: 1 TABLET ORAL at 08:39

## 2023-06-16 RX ADMIN — METRONIDAZOLE 500 MG: 500 INJECTION, SOLUTION INTRAVENOUS at 08:39

## 2023-06-16 RX ADMIN — HYDROCHLOROTHIAZIDE 12.5 MG: 25 TABLET ORAL at 08:39

## 2023-06-16 RX ADMIN — SERTRALINE 25 MG: 25 TABLET, FILM COATED ORAL at 08:40

## 2023-06-16 RX ADMIN — OXYCODONE HYDROCHLORIDE 2.5 MG: 5 TABLET ORAL at 13:30

## 2023-06-16 RX ADMIN — MEMANTINE 5 MG: 10 TABLET ORAL at 21:03

## 2023-06-16 RX ADMIN — METRONIDAZOLE 500 MG: 500 INJECTION, SOLUTION INTRAVENOUS at 15:27

## 2023-06-16 RX ADMIN — METRONIDAZOLE 500 MG: 500 INJECTION, SOLUTION INTRAVENOUS at 22:32

## 2023-06-16 RX ADMIN — POTASSIUM CHLORIDE 10 MEQ: 7.45 INJECTION INTRAVENOUS at 14:20

## 2023-06-16 RX ADMIN — POTASSIUM CHLORIDE 10 MEQ: 7.45 INJECTION INTRAVENOUS at 10:53

## 2023-06-16 RX ADMIN — ATORVASTATIN CALCIUM 80 MG: 20 TABLET, FILM COATED ORAL at 21:04

## 2023-06-16 RX ADMIN — LOSARTAN POTASSIUM 50 MG: 50 TABLET, FILM COATED ORAL at 08:39

## 2023-06-16 RX ADMIN — TAMSULOSIN HYDROCHLORIDE 0.4 MG: 0.4 CAPSULE ORAL at 08:40

## 2023-06-16 ASSESSMENT — PAIN SCALES - GENERAL
PAINLEVEL_OUTOF10: 2
PAINLEVEL_OUTOF10: 5

## 2023-06-16 ASSESSMENT — PAIN DESCRIPTION - DESCRIPTORS: DESCRIPTORS: ACHING

## 2023-06-16 ASSESSMENT — PAIN DESCRIPTION - LOCATION: LOCATION: ABDOMEN

## 2023-06-17 LAB
ANION GAP SERPL CALC-SCNC: 5 MMOL/L (ref 5–15)
BACTERIA SPEC CULT: ABNORMAL
BACTERIA SPEC CULT: ABNORMAL
BASOPHILS # BLD: 0.1 K/UL (ref 0–0.1)
BASOPHILS NFR BLD: 1 % (ref 0–1)
BUN SERPL-MCNC: 10 MG/DL (ref 6–20)
BUN/CREAT SERPL: 21 (ref 12–20)
CALCIUM SERPL-MCNC: 8.2 MG/DL (ref 8.5–10.1)
CC UR VC: ABNORMAL
CHLORIDE SERPL-SCNC: 94 MMOL/L (ref 97–108)
CO2 SERPL-SCNC: 26 MMOL/L (ref 21–32)
CREAT SERPL-MCNC: 0.47 MG/DL (ref 0.55–1.02)
DIFFERENTIAL METHOD BLD: ABNORMAL
EOSINOPHIL # BLD: 2.6 K/UL (ref 0–0.4)
EOSINOPHIL NFR BLD: 24 % (ref 0–7)
ERYTHROCYTE [DISTWIDTH] IN BLOOD BY AUTOMATED COUNT: 13.2 % (ref 11.5–14.5)
GLUCOSE SERPL-MCNC: 106 MG/DL (ref 65–100)
HCT VFR BLD AUTO: 29.1 % (ref 35–47)
HGB BLD-MCNC: 10 G/DL (ref 11.5–16)
IMM GRANULOCYTES # BLD AUTO: 0 K/UL (ref 0–0.04)
IMM GRANULOCYTES NFR BLD AUTO: 0 % (ref 0–0.5)
LYMPHOCYTES # BLD: 2.1 K/UL (ref 0.8–3.5)
LYMPHOCYTES NFR BLD: 19 % (ref 12–49)
MAGNESIUM SERPL-MCNC: 1.6 MG/DL (ref 1.6–2.4)
MCH RBC QN AUTO: 29 PG (ref 26–34)
MCHC RBC AUTO-ENTMCNC: 34.4 G/DL (ref 30–36.5)
MCV RBC AUTO: 84.3 FL (ref 80–99)
MONOCYTES # BLD: 0.9 K/UL (ref 0–1)
MONOCYTES NFR BLD: 8 % (ref 5–13)
NEUTS SEG # BLD: 5.5 K/UL (ref 1.8–8)
NEUTS SEG NFR BLD: 49 % (ref 32–75)
NRBC # BLD: 0 K/UL (ref 0–0.01)
NRBC BLD-RTO: 0 PER 100 WBC
PHOSPHATE SERPL-MCNC: 2.1 MG/DL (ref 2.6–4.7)
PLATELET # BLD AUTO: 142 K/UL (ref 150–400)
PMV BLD AUTO: 10.1 FL (ref 8.9–12.9)
POTASSIUM SERPL-SCNC: 3.3 MMOL/L (ref 3.5–5.1)
RBC # BLD AUTO: 3.45 M/UL (ref 3.8–5.2)
SERVICE CMNT-IMP: ABNORMAL
SODIUM SERPL-SCNC: 125 MMOL/L (ref 136–145)
WBC # BLD AUTO: 11.1 K/UL (ref 3.6–11)

## 2023-06-17 PROCEDURE — 85025 COMPLETE CBC W/AUTO DIFF WBC: CPT

## 2023-06-17 PROCEDURE — 94761 N-INVAS EAR/PLS OXIMETRY MLT: CPT

## 2023-06-17 PROCEDURE — 6370000000 HC RX 637 (ALT 250 FOR IP): Performed by: INTERNAL MEDICINE

## 2023-06-17 PROCEDURE — 83735 ASSAY OF MAGNESIUM: CPT

## 2023-06-17 PROCEDURE — 6360000002 HC RX W HCPCS: Performed by: INTERNAL MEDICINE

## 2023-06-17 PROCEDURE — 84100 ASSAY OF PHOSPHORUS: CPT

## 2023-06-17 PROCEDURE — 2500000003 HC RX 250 WO HCPCS: Performed by: STUDENT IN AN ORGANIZED HEALTH CARE EDUCATION/TRAINING PROGRAM

## 2023-06-17 PROCEDURE — 80048 BASIC METABOLIC PNL TOTAL CA: CPT

## 2023-06-17 PROCEDURE — 2580000003 HC RX 258: Performed by: INTERNAL MEDICINE

## 2023-06-17 PROCEDURE — 36415 COLL VENOUS BLD VENIPUNCTURE: CPT

## 2023-06-17 PROCEDURE — 6370000000 HC RX 637 (ALT 250 FOR IP)

## 2023-06-17 PROCEDURE — 1100000000 HC RM PRIVATE

## 2023-06-17 RX ORDER — SODIUM CHLORIDE 9 MG/ML
INJECTION, SOLUTION INTRAVENOUS CONTINUOUS
Status: DISPENSED | OUTPATIENT
Start: 2023-06-17 | End: 2023-06-17

## 2023-06-17 RX ADMIN — METRONIDAZOLE 500 MG: 500 INJECTION, SOLUTION INTRAVENOUS at 15:01

## 2023-06-17 RX ADMIN — MEMANTINE 5 MG: 10 TABLET ORAL at 20:04

## 2023-06-17 RX ADMIN — SODIUM CHLORIDE, PRESERVATIVE FREE 10 ML: 5 INJECTION INTRAVENOUS at 08:06

## 2023-06-17 RX ADMIN — METRONIDAZOLE 500 MG: 500 INJECTION, SOLUTION INTRAVENOUS at 06:59

## 2023-06-17 RX ADMIN — ENOXAPARIN SODIUM 40 MG: 40 INJECTION SUBCUTANEOUS at 08:05

## 2023-06-17 RX ADMIN — ATORVASTATIN CALCIUM 80 MG: 20 TABLET, FILM COATED ORAL at 20:04

## 2023-06-17 RX ADMIN — SODIUM CHLORIDE: 9 INJECTION, SOLUTION INTRAVENOUS at 10:36

## 2023-06-17 RX ADMIN — WATER 1000 MG: 1 INJECTION INTRAMUSCULAR; INTRAVENOUS; SUBCUTANEOUS at 18:11

## 2023-06-17 RX ADMIN — RISPERIDONE 1 MG: 1 TABLET ORAL at 20:04

## 2023-06-17 RX ADMIN — SODIUM CHLORIDE, PRESERVATIVE FREE 10 ML: 5 INJECTION INTRAVENOUS at 20:04

## 2023-06-17 RX ADMIN — METRONIDAZOLE 500 MG: 500 INJECTION, SOLUTION INTRAVENOUS at 22:10

## 2023-06-17 RX ADMIN — RISPERIDONE 1 MG: 1 TABLET ORAL at 08:06

## 2023-06-17 RX ADMIN — LOSARTAN POTASSIUM 50 MG: 50 TABLET, FILM COATED ORAL at 08:05

## 2023-06-17 RX ADMIN — SODIUM CHLORIDE, PRESERVATIVE FREE 10 ML: 5 INJECTION INTRAVENOUS at 08:07

## 2023-06-17 RX ADMIN — HYDROCHLOROTHIAZIDE 12.5 MG: 25 TABLET ORAL at 08:05

## 2023-06-17 RX ADMIN — SERTRALINE 25 MG: 25 TABLET, FILM COATED ORAL at 08:05

## 2023-06-17 RX ADMIN — MEMANTINE 5 MG: 10 TABLET ORAL at 08:06

## 2023-06-17 RX ADMIN — TAMSULOSIN HYDROCHLORIDE 0.4 MG: 0.4 CAPSULE ORAL at 08:05

## 2023-06-18 LAB
ANION GAP SERPL CALC-SCNC: 5 MMOL/L (ref 5–15)
ANION GAP SERPL CALC-SCNC: 6 MMOL/L (ref 5–15)
BUN SERPL-MCNC: 12 MG/DL (ref 6–20)
BUN SERPL-MCNC: 15 MG/DL (ref 6–20)
BUN/CREAT SERPL: 11 (ref 12–20)
BUN/CREAT SERPL: 17 (ref 12–20)
CALCIUM SERPL-MCNC: 8.4 MG/DL (ref 8.5–10.1)
CALCIUM SERPL-MCNC: 8.6 MG/DL (ref 8.5–10.1)
CHLORIDE SERPL-SCNC: 105 MMOL/L (ref 97–108)
CHLORIDE SERPL-SCNC: 99 MMOL/L (ref 97–108)
CO2 SERPL-SCNC: 26 MMOL/L (ref 21–32)
CO2 SERPL-SCNC: 27 MMOL/L (ref 21–32)
CORTIS SERPL-MCNC: 12.9 UG/DL
CREAT SERPL-MCNC: 0.89 MG/DL (ref 0.55–1.02)
CREAT SERPL-MCNC: 1.05 MG/DL (ref 0.55–1.02)
ERYTHROCYTE [SEDIMENTATION RATE] IN BLOOD: 16 MM/HR (ref 0–30)
GLUCOSE SERPL-MCNC: 128 MG/DL (ref 65–100)
GLUCOSE SERPL-MCNC: 134 MG/DL (ref 65–100)
OSMOLALITY SERPL: 272 MOSM/KG H2O
POTASSIUM SERPL-SCNC: 3.2 MMOL/L (ref 3.5–5.1)
POTASSIUM SERPL-SCNC: 3.4 MMOL/L (ref 3.5–5.1)
SODIUM SERPL-SCNC: 131 MMOL/L (ref 136–145)
SODIUM SERPL-SCNC: 137 MMOL/L (ref 136–145)
T4 FREE SERPL-MCNC: 1.1 NG/DL (ref 0.8–1.5)
TSH SERPL DL<=0.05 MIU/L-ACNC: 3.94 UIU/ML (ref 0.36–3.74)

## 2023-06-18 PROCEDURE — 36415 COLL VENOUS BLD VENIPUNCTURE: CPT

## 2023-06-18 PROCEDURE — 80048 BASIC METABOLIC PNL TOTAL CA: CPT

## 2023-06-18 PROCEDURE — 1100000000 HC RM PRIVATE

## 2023-06-18 PROCEDURE — 85652 RBC SED RATE AUTOMATED: CPT

## 2023-06-18 PROCEDURE — 84439 ASSAY OF FREE THYROXINE: CPT

## 2023-06-18 PROCEDURE — 83930 ASSAY OF BLOOD OSMOLALITY: CPT

## 2023-06-18 PROCEDURE — 94761 N-INVAS EAR/PLS OXIMETRY MLT: CPT

## 2023-06-18 PROCEDURE — 84443 ASSAY THYROID STIM HORMONE: CPT

## 2023-06-18 PROCEDURE — 82533 TOTAL CORTISOL: CPT

## 2023-06-18 PROCEDURE — 6360000002 HC RX W HCPCS: Performed by: INTERNAL MEDICINE

## 2023-06-18 PROCEDURE — 6370000000 HC RX 637 (ALT 250 FOR IP)

## 2023-06-18 PROCEDURE — 2580000003 HC RX 258: Performed by: INTERNAL MEDICINE

## 2023-06-18 PROCEDURE — 2500000003 HC RX 250 WO HCPCS: Performed by: STUDENT IN AN ORGANIZED HEALTH CARE EDUCATION/TRAINING PROGRAM

## 2023-06-18 PROCEDURE — 6370000000 HC RX 637 (ALT 250 FOR IP): Performed by: INTERNAL MEDICINE

## 2023-06-18 RX ORDER — SODIUM CHLORIDE 9 MG/ML
INJECTION, SOLUTION INTRAVENOUS CONTINUOUS
Status: DISCONTINUED | OUTPATIENT
Start: 2023-06-18 | End: 2023-06-19

## 2023-06-18 RX ADMIN — MEMANTINE 5 MG: 10 TABLET ORAL at 20:04

## 2023-06-18 RX ADMIN — METRONIDAZOLE 500 MG: 500 INJECTION, SOLUTION INTRAVENOUS at 06:00

## 2023-06-18 RX ADMIN — WATER 1000 MG: 1 INJECTION INTRAMUSCULAR; INTRAVENOUS; SUBCUTANEOUS at 17:16

## 2023-06-18 RX ADMIN — TAMSULOSIN HYDROCHLORIDE 0.4 MG: 0.4 CAPSULE ORAL at 08:49

## 2023-06-18 RX ADMIN — ATORVASTATIN CALCIUM 80 MG: 20 TABLET, FILM COATED ORAL at 20:04

## 2023-06-18 RX ADMIN — METRONIDAZOLE 500 MG: 500 INJECTION, SOLUTION INTRAVENOUS at 14:28

## 2023-06-18 RX ADMIN — LOSARTAN POTASSIUM 50 MG: 50 TABLET, FILM COATED ORAL at 08:49

## 2023-06-18 RX ADMIN — METRONIDAZOLE 500 MG: 500 INJECTION, SOLUTION INTRAVENOUS at 22:31

## 2023-06-18 RX ADMIN — MEMANTINE 5 MG: 10 TABLET ORAL at 08:49

## 2023-06-18 RX ADMIN — RISPERIDONE 1 MG: 1 TABLET ORAL at 20:04

## 2023-06-18 RX ADMIN — ENOXAPARIN SODIUM 40 MG: 40 INJECTION SUBCUTANEOUS at 08:49

## 2023-06-18 RX ADMIN — SODIUM CHLORIDE: 9 INJECTION, SOLUTION INTRAVENOUS at 11:54

## 2023-06-18 RX ADMIN — SODIUM CHLORIDE, PRESERVATIVE FREE 10 ML: 5 INJECTION INTRAVENOUS at 08:50

## 2023-06-18 RX ADMIN — SODIUM CHLORIDE, PRESERVATIVE FREE 10 ML: 5 INJECTION INTRAVENOUS at 20:04

## 2023-06-18 RX ADMIN — RISPERIDONE 1 MG: 1 TABLET ORAL at 08:49

## 2023-06-19 LAB
ANION GAP SERPL CALC-SCNC: 4 MMOL/L (ref 5–15)
ANION GAP SERPL CALC-SCNC: 4 MMOL/L (ref 5–15)
BUN SERPL-MCNC: 12 MG/DL (ref 6–20)
BUN SERPL-MCNC: 12 MG/DL (ref 6–20)
BUN/CREAT SERPL: 17 (ref 12–20)
BUN/CREAT SERPL: 17 (ref 12–20)
CALCIUM SERPL-MCNC: 8.4 MG/DL (ref 8.5–10.1)
CALCIUM SERPL-MCNC: 8.5 MG/DL (ref 8.5–10.1)
CHLORIDE SERPL-SCNC: 108 MMOL/L (ref 97–108)
CHLORIDE SERPL-SCNC: 108 MMOL/L (ref 97–108)
CO2 SERPL-SCNC: 27 MMOL/L (ref 21–32)
CO2 SERPL-SCNC: 28 MMOL/L (ref 21–32)
COMMENT:: NORMAL
CREAT SERPL-MCNC: 0.7 MG/DL (ref 0.55–1.02)
CREAT SERPL-MCNC: 0.72 MG/DL (ref 0.55–1.02)
GLUCOSE SERPL-MCNC: 120 MG/DL (ref 65–100)
GLUCOSE SERPL-MCNC: 122 MG/DL (ref 65–100)
OSMOLALITY UR: 242 MOSM/KG H2O
POTASSIUM SERPL-SCNC: 3.2 MMOL/L (ref 3.5–5.1)
POTASSIUM SERPL-SCNC: 3.4 MMOL/L (ref 3.5–5.1)
POTASSIUM UR-SCNC: 7 MMOL/L
SODIUM SERPL-SCNC: 139 MMOL/L (ref 136–145)
SODIUM SERPL-SCNC: 140 MMOL/L (ref 136–145)
SODIUM UR-SCNC: 35 MMOL/L
SPECIMEN HOLD: NORMAL

## 2023-06-19 PROCEDURE — 2580000003 HC RX 258: Performed by: INTERNAL MEDICINE

## 2023-06-19 PROCEDURE — 97535 SELF CARE MNGMENT TRAINING: CPT

## 2023-06-19 PROCEDURE — 80048 BASIC METABOLIC PNL TOTAL CA: CPT

## 2023-06-19 PROCEDURE — 94761 N-INVAS EAR/PLS OXIMETRY MLT: CPT

## 2023-06-19 PROCEDURE — 6370000000 HC RX 637 (ALT 250 FOR IP): Performed by: INTERNAL MEDICINE

## 2023-06-19 PROCEDURE — 2500000003 HC RX 250 WO HCPCS: Performed by: STUDENT IN AN ORGANIZED HEALTH CARE EDUCATION/TRAINING PROGRAM

## 2023-06-19 PROCEDURE — 84300 ASSAY OF URINE SODIUM: CPT

## 2023-06-19 PROCEDURE — 6360000002 HC RX W HCPCS: Performed by: INTERNAL MEDICINE

## 2023-06-19 PROCEDURE — 83935 ASSAY OF URINE OSMOLALITY: CPT

## 2023-06-19 PROCEDURE — 36415 COLL VENOUS BLD VENIPUNCTURE: CPT

## 2023-06-19 PROCEDURE — 6370000000 HC RX 637 (ALT 250 FOR IP)

## 2023-06-19 PROCEDURE — 84133 ASSAY OF URINE POTASSIUM: CPT

## 2023-06-19 PROCEDURE — 1100000000 HC RM PRIVATE

## 2023-06-19 RX ADMIN — SODIUM CHLORIDE, PRESERVATIVE FREE 10 ML: 5 INJECTION INTRAVENOUS at 08:47

## 2023-06-19 RX ADMIN — ENOXAPARIN SODIUM 40 MG: 40 INJECTION SUBCUTANEOUS at 08:44

## 2023-06-19 RX ADMIN — ATORVASTATIN CALCIUM 80 MG: 20 TABLET, FILM COATED ORAL at 21:57

## 2023-06-19 RX ADMIN — TAMSULOSIN HYDROCHLORIDE 0.4 MG: 0.4 CAPSULE ORAL at 08:44

## 2023-06-19 RX ADMIN — METRONIDAZOLE 500 MG: 500 INJECTION, SOLUTION INTRAVENOUS at 06:10

## 2023-06-19 RX ADMIN — MEMANTINE 5 MG: 10 TABLET ORAL at 21:57

## 2023-06-19 RX ADMIN — ACETAMINOPHEN 650 MG: 325 TABLET ORAL at 12:28

## 2023-06-19 RX ADMIN — WATER 1000 MG: 1 INJECTION INTRAMUSCULAR; INTRAVENOUS; SUBCUTANEOUS at 17:33

## 2023-06-19 RX ADMIN — LOSARTAN POTASSIUM 50 MG: 50 TABLET, FILM COATED ORAL at 08:45

## 2023-06-19 RX ADMIN — RISPERIDONE 1 MG: 1 TABLET ORAL at 21:57

## 2023-06-19 RX ADMIN — RISPERIDONE 1 MG: 1 TABLET ORAL at 08:45

## 2023-06-19 RX ADMIN — METRONIDAZOLE 500 MG: 500 INJECTION, SOLUTION INTRAVENOUS at 22:01

## 2023-06-19 RX ADMIN — METRONIDAZOLE 500 MG: 500 INJECTION, SOLUTION INTRAVENOUS at 17:33

## 2023-06-19 RX ADMIN — MEMANTINE 5 MG: 10 TABLET ORAL at 08:45

## 2023-06-19 RX ADMIN — SODIUM CHLORIDE, PRESERVATIVE FREE 10 ML: 5 INJECTION INTRAVENOUS at 08:44

## 2023-06-19 ASSESSMENT — PAIN DESCRIPTION - LOCATION: LOCATION: KNEE

## 2023-06-19 ASSESSMENT — PAIN DESCRIPTION - DESCRIPTORS: DESCRIPTORS: ACHING

## 2023-06-19 ASSESSMENT — PAIN DESCRIPTION - ORIENTATION: ORIENTATION: LEFT

## 2023-06-19 ASSESSMENT — PAIN SCALES - GENERAL
PAINLEVEL_OUTOF10: 0
PAINLEVEL_OUTOF10: 7

## 2023-06-19 ASSESSMENT — PAIN - FUNCTIONAL ASSESSMENT: PAIN_FUNCTIONAL_ASSESSMENT: ACTIVITIES ARE NOT PREVENTED

## 2023-06-20 LAB
ANION GAP SERPL CALC-SCNC: 3 MMOL/L (ref 5–15)
BACTERIA SPEC CULT: NORMAL
BACTERIA SPEC CULT: NORMAL
BUN SERPL-MCNC: 17 MG/DL (ref 6–20)
BUN/CREAT SERPL: 24 (ref 12–20)
CALCIUM SERPL-MCNC: 8.2 MG/DL (ref 8.5–10.1)
CHLORIDE SERPL-SCNC: 109 MMOL/L (ref 97–108)
CO2 SERPL-SCNC: 27 MMOL/L (ref 21–32)
COMMENT:: NORMAL
CREAT SERPL-MCNC: 0.71 MG/DL (ref 0.55–1.02)
GLUCOSE SERPL-MCNC: 106 MG/DL (ref 65–100)
POTASSIUM SERPL-SCNC: 3.3 MMOL/L (ref 3.5–5.1)
SERVICE CMNT-IMP: NORMAL
SERVICE CMNT-IMP: NORMAL
SODIUM SERPL-SCNC: 139 MMOL/L (ref 136–145)
SPECIMEN HOLD: NORMAL

## 2023-06-20 PROCEDURE — 2580000003 HC RX 258: Performed by: INTERNAL MEDICINE

## 2023-06-20 PROCEDURE — 1100000000 HC RM PRIVATE

## 2023-06-20 PROCEDURE — 6370000000 HC RX 637 (ALT 250 FOR IP): Performed by: INTERNAL MEDICINE

## 2023-06-20 PROCEDURE — 94761 N-INVAS EAR/PLS OXIMETRY MLT: CPT

## 2023-06-20 PROCEDURE — 80048 BASIC METABOLIC PNL TOTAL CA: CPT

## 2023-06-20 PROCEDURE — 97116 GAIT TRAINING THERAPY: CPT

## 2023-06-20 PROCEDURE — 2500000003 HC RX 250 WO HCPCS: Performed by: STUDENT IN AN ORGANIZED HEALTH CARE EDUCATION/TRAINING PROGRAM

## 2023-06-20 PROCEDURE — 97110 THERAPEUTIC EXERCISES: CPT

## 2023-06-20 PROCEDURE — 6360000002 HC RX W HCPCS: Performed by: INTERNAL MEDICINE

## 2023-06-20 PROCEDURE — 36415 COLL VENOUS BLD VENIPUNCTURE: CPT

## 2023-06-20 RX ADMIN — TAMSULOSIN HYDROCHLORIDE 0.4 MG: 0.4 CAPSULE ORAL at 08:11

## 2023-06-20 RX ADMIN — METRONIDAZOLE 500 MG: 500 INJECTION, SOLUTION INTRAVENOUS at 06:53

## 2023-06-20 RX ADMIN — MEMANTINE 5 MG: 10 TABLET ORAL at 08:11

## 2023-06-20 RX ADMIN — SODIUM CHLORIDE, PRESERVATIVE FREE 10 ML: 5 INJECTION INTRAVENOUS at 08:12

## 2023-06-20 RX ADMIN — ATORVASTATIN CALCIUM 80 MG: 20 TABLET, FILM COATED ORAL at 20:13

## 2023-06-20 RX ADMIN — ENOXAPARIN SODIUM 40 MG: 40 INJECTION SUBCUTANEOUS at 08:11

## 2023-06-20 RX ADMIN — SODIUM CHLORIDE, PRESERVATIVE FREE 10 ML: 5 INJECTION INTRAVENOUS at 23:14

## 2023-06-20 RX ADMIN — WATER 1000 MG: 1 INJECTION INTRAMUSCULAR; INTRAVENOUS; SUBCUTANEOUS at 17:09

## 2023-06-20 RX ADMIN — SODIUM CHLORIDE, PRESERVATIVE FREE 10 ML: 5 INJECTION INTRAVENOUS at 08:13

## 2023-06-20 RX ADMIN — METRONIDAZOLE 500 MG: 500 INJECTION, SOLUTION INTRAVENOUS at 23:14

## 2023-06-20 RX ADMIN — MEMANTINE 5 MG: 10 TABLET ORAL at 20:14

## 2023-06-20 RX ADMIN — OXYCODONE HYDROCHLORIDE 2.5 MG: 5 TABLET ORAL at 20:09

## 2023-06-20 RX ADMIN — RISPERIDONE 1 MG: 1 TABLET ORAL at 08:11

## 2023-06-20 RX ADMIN — RISPERIDONE 1 MG: 1 TABLET ORAL at 20:14

## 2023-06-20 RX ADMIN — SODIUM CHLORIDE, PRESERVATIVE FREE 10 ML: 5 INJECTION INTRAVENOUS at 20:14

## 2023-06-20 RX ADMIN — SODIUM CHLORIDE, PRESERVATIVE FREE 10 ML: 5 INJECTION INTRAVENOUS at 21:30

## 2023-06-20 RX ADMIN — METRONIDAZOLE 500 MG: 500 INJECTION, SOLUTION INTRAVENOUS at 14:14

## 2023-06-20 ASSESSMENT — PAIN SCALES - GENERAL
PAINLEVEL_OUTOF10: 0
PAINLEVEL_OUTOF10: 8
PAINLEVEL_OUTOF10: 0
PAINLEVEL_OUTOF10: 3

## 2023-06-20 ASSESSMENT — PAIN DESCRIPTION - LOCATION: LOCATION: KNEE

## 2023-06-20 ASSESSMENT — PAIN DESCRIPTION - DESCRIPTORS: DESCRIPTORS: SHARP

## 2023-06-20 ASSESSMENT — PAIN DESCRIPTION - ORIENTATION: ORIENTATION: LEFT

## 2023-06-21 ENCOUNTER — ANESTHESIA EVENT (OUTPATIENT)
Facility: HOSPITAL | Age: 88
End: 2023-06-21
Payer: MEDICARE

## 2023-06-21 LAB
ANION GAP SERPL CALC-SCNC: 5 MMOL/L (ref 5–15)
BASOPHILS # BLD: 0.1 K/UL (ref 0–0.1)
BASOPHILS NFR BLD: 1 % (ref 0–1)
BUN SERPL-MCNC: 21 MG/DL (ref 6–20)
BUN/CREAT SERPL: 30 (ref 12–20)
CALCIUM SERPL-MCNC: 8.2 MG/DL (ref 8.5–10.1)
CHLORIDE SERPL-SCNC: 108 MMOL/L (ref 97–108)
CO2 SERPL-SCNC: 24 MMOL/L (ref 21–32)
CREAT SERPL-MCNC: 0.69 MG/DL (ref 0.55–1.02)
DIFFERENTIAL METHOD BLD: ABNORMAL
EOSINOPHIL # BLD: 2 K/UL (ref 0–0.4)
EOSINOPHIL NFR BLD: 20 % (ref 0–7)
ERYTHROCYTE [DISTWIDTH] IN BLOOD BY AUTOMATED COUNT: 14.3 % (ref 11.5–14.5)
GLUCOSE SERPL-MCNC: 100 MG/DL (ref 65–100)
HCT VFR BLD AUTO: 27.2 % (ref 35–47)
HGB BLD-MCNC: 9.2 G/DL (ref 11.5–16)
IMM GRANULOCYTES # BLD AUTO: 0 K/UL (ref 0–0.04)
IMM GRANULOCYTES NFR BLD AUTO: 0 % (ref 0–0.5)
LYMPHOCYTES # BLD: 1.9 K/UL (ref 0.8–3.5)
LYMPHOCYTES NFR BLD: 19 % (ref 12–49)
MCH RBC QN AUTO: 29.9 PG (ref 26–34)
MCHC RBC AUTO-ENTMCNC: 33.8 G/DL (ref 30–36.5)
MCV RBC AUTO: 88.3 FL (ref 80–99)
MONOCYTES # BLD: 0.9 K/UL (ref 0–1)
MONOCYTES NFR BLD: 9 % (ref 5–13)
NEUTS SEG # BLD: 5.2 K/UL (ref 1.8–8)
NEUTS SEG NFR BLD: 51 % (ref 32–75)
NRBC # BLD: 0 K/UL (ref 0–0.01)
NRBC BLD-RTO: 0 PER 100 WBC
PLATELET # BLD AUTO: 199 K/UL (ref 150–400)
PMV BLD AUTO: 10.6 FL (ref 8.9–12.9)
POTASSIUM SERPL-SCNC: 3.9 MMOL/L (ref 3.5–5.1)
RBC # BLD AUTO: 3.08 M/UL (ref 3.8–5.2)
RBC MORPH BLD: ABNORMAL
SODIUM SERPL-SCNC: 137 MMOL/L (ref 136–145)
WBC # BLD AUTO: 10.1 K/UL (ref 3.6–11)

## 2023-06-21 PROCEDURE — 85025 COMPLETE CBC W/AUTO DIFF WBC: CPT

## 2023-06-21 PROCEDURE — 2500000003 HC RX 250 WO HCPCS: Performed by: STUDENT IN AN ORGANIZED HEALTH CARE EDUCATION/TRAINING PROGRAM

## 2023-06-21 PROCEDURE — 6360000002 HC RX W HCPCS: Performed by: INTERNAL MEDICINE

## 2023-06-21 PROCEDURE — 1100000000 HC RM PRIVATE

## 2023-06-21 PROCEDURE — 6370000000 HC RX 637 (ALT 250 FOR IP): Performed by: INTERNAL MEDICINE

## 2023-06-21 PROCEDURE — 2580000003 HC RX 258: Performed by: INTERNAL MEDICINE

## 2023-06-21 PROCEDURE — 94761 N-INVAS EAR/PLS OXIMETRY MLT: CPT

## 2023-06-21 PROCEDURE — 6370000000 HC RX 637 (ALT 250 FOR IP)

## 2023-06-21 RX ORDER — ALUMINUM ZIRCONIUM OCTACHLOROHYDREX GLY 16 G/100G
1 GEL TOPICAL DAILY
Status: DISCONTINUED | OUTPATIENT
Start: 2023-06-21 | End: 2023-06-25 | Stop reason: HOSPADM

## 2023-06-21 RX ADMIN — LOSARTAN POTASSIUM 50 MG: 50 TABLET, FILM COATED ORAL at 09:25

## 2023-06-21 RX ADMIN — RISPERIDONE 1 MG: 1 TABLET ORAL at 19:56

## 2023-06-21 RX ADMIN — MEMANTINE 5 MG: 10 TABLET ORAL at 19:55

## 2023-06-21 RX ADMIN — RISPERIDONE 1 MG: 1 TABLET ORAL at 09:26

## 2023-06-21 RX ADMIN — MEMANTINE 5 MG: 10 TABLET ORAL at 09:25

## 2023-06-21 RX ADMIN — METRONIDAZOLE 500 MG: 500 INJECTION, SOLUTION INTRAVENOUS at 14:30

## 2023-06-21 RX ADMIN — ENOXAPARIN SODIUM 40 MG: 40 INJECTION SUBCUTANEOUS at 09:25

## 2023-06-21 RX ADMIN — METRONIDAZOLE 500 MG: 500 INJECTION, SOLUTION INTRAVENOUS at 06:35

## 2023-06-21 RX ADMIN — TAMSULOSIN HYDROCHLORIDE 0.4 MG: 0.4 CAPSULE ORAL at 09:25

## 2023-06-21 RX ADMIN — ACETAMINOPHEN 650 MG: 325 TABLET ORAL at 19:55

## 2023-06-21 RX ADMIN — WATER 1000 MG: 1 INJECTION INTRAMUSCULAR; INTRAVENOUS; SUBCUTANEOUS at 18:19

## 2023-06-21 RX ADMIN — SODIUM CHLORIDE, PRESERVATIVE FREE 10 ML: 5 INJECTION INTRAVENOUS at 19:56

## 2023-06-21 RX ADMIN — METRONIDAZOLE 500 MG: 500 INJECTION, SOLUTION INTRAVENOUS at 22:35

## 2023-06-21 RX ADMIN — ATORVASTATIN CALCIUM 80 MG: 20 TABLET, FILM COATED ORAL at 19:55

## 2023-06-21 RX ADMIN — SODIUM CHLORIDE, PRESERVATIVE FREE 10 ML: 5 INJECTION INTRAVENOUS at 09:27

## 2023-06-21 RX ADMIN — Medication 1 CAPSULE: at 22:36

## 2023-06-21 ASSESSMENT — PAIN SCALES - GENERAL: PAINLEVEL_OUTOF10: 0

## 2023-06-22 ENCOUNTER — APPOINTMENT (OUTPATIENT)
Facility: HOSPITAL | Age: 88
DRG: 270 | End: 2023-06-22
Payer: MEDICARE

## 2023-06-22 ENCOUNTER — ANESTHESIA (OUTPATIENT)
Facility: HOSPITAL | Age: 88
End: 2023-06-22
Payer: MEDICARE

## 2023-06-22 LAB
ABO + RH BLD: NORMAL
ANION GAP SERPL CALC-SCNC: 6 MMOL/L (ref 5–15)
BLOOD GROUP ANTIBODIES SERPL: NORMAL
BUN SERPL-MCNC: 20 MG/DL (ref 6–20)
BUN/CREAT SERPL: 34 (ref 12–20)
CALCIUM SERPL-MCNC: 8.4 MG/DL (ref 8.5–10.1)
CHLORIDE SERPL-SCNC: 111 MMOL/L (ref 97–108)
CO2 SERPL-SCNC: 25 MMOL/L (ref 21–32)
CREAT SERPL-MCNC: 0.59 MG/DL (ref 0.55–1.02)
ERYTHROCYTE [DISTWIDTH] IN BLOOD BY AUTOMATED COUNT: 14.7 % (ref 11.5–14.5)
EST. AVERAGE GLUCOSE BLD GHB EST-MCNC: 103 MG/DL
FERRITIN SERPL-MCNC: 68 NG/ML (ref 26–388)
GLUCOSE SERPL-MCNC: 118 MG/DL (ref 65–100)
HBA1C MFR BLD: 5.2 % (ref 4–5.6)
HCT VFR BLD AUTO: 26.4 % (ref 35–47)
HGB BLD-MCNC: 8.8 G/DL (ref 11.5–16)
IRON SATN MFR SERPL: 14 % (ref 20–50)
IRON SERPL-MCNC: 26 UG/DL (ref 35–150)
MAGNESIUM SERPL-MCNC: 2 MG/DL (ref 1.6–2.4)
MCH RBC QN AUTO: 29.5 PG (ref 26–34)
MCHC RBC AUTO-ENTMCNC: 33.3 G/DL (ref 30–36.5)
MCV RBC AUTO: 88.6 FL (ref 80–99)
NRBC # BLD: 0 K/UL (ref 0–0.01)
NRBC BLD-RTO: 0 PER 100 WBC
PHOSPHATE SERPL-MCNC: 3 MG/DL (ref 2.6–4.7)
PLATELET # BLD AUTO: 201 K/UL (ref 150–400)
PMV BLD AUTO: 10.1 FL (ref 8.9–12.9)
POTASSIUM SERPL-SCNC: 3.6 MMOL/L (ref 3.5–5.1)
RBC # BLD AUTO: 2.98 M/UL (ref 3.8–5.2)
SODIUM SERPL-SCNC: 142 MMOL/L (ref 136–145)
SPECIMEN EXP DATE BLD: NORMAL
TIBC SERPL-MCNC: 180 UG/DL (ref 250–450)
WBC # BLD AUTO: 8.5 K/UL (ref 3.6–11)

## 2023-06-22 PROCEDURE — 86850 RBC ANTIBODY SCREEN: CPT

## 2023-06-22 PROCEDURE — 6360000002 HC RX W HCPCS

## 2023-06-22 PROCEDURE — 82728 ASSAY OF FERRITIN: CPT

## 2023-06-22 PROCEDURE — 1100000003 HC PRIVATE W/ TELEMETRY

## 2023-06-22 PROCEDURE — B4101ZZ FLUOROSCOPY OF ABDOMINAL AORTA USING LOW OSMOLAR CONTRAST: ICD-10-PCS

## 2023-06-22 PROCEDURE — C1894 INTRO/SHEATH, NON-LASER: HCPCS

## 2023-06-22 PROCEDURE — 6370000000 HC RX 637 (ALT 250 FOR IP)

## 2023-06-22 PROCEDURE — 7100000000 HC PACU RECOVERY - FIRST 15 MIN

## 2023-06-22 PROCEDURE — 2580000003 HC RX 258

## 2023-06-22 PROCEDURE — 6360000002 HC RX W HCPCS: Performed by: INTERNAL MEDICINE

## 2023-06-22 PROCEDURE — C1713 ANCHOR/SCREW BN/BN,TIS/BN: HCPCS

## 2023-06-22 PROCEDURE — 80048 BASIC METABOLIC PNL TOTAL CA: CPT

## 2023-06-22 PROCEDURE — 6360000002 HC RX W HCPCS: Performed by: NURSE ANESTHETIST, CERTIFIED REGISTERED

## 2023-06-22 PROCEDURE — 2500000003 HC RX 250 WO HCPCS: Performed by: NURSE ANESTHETIST, CERTIFIED REGISTERED

## 2023-06-22 PROCEDURE — 98960 EDU&TRN PT SELF-MGMT NQHP 1: CPT

## 2023-06-22 PROCEDURE — C1725 CATH, TRANSLUMIN NON-LASER: HCPCS

## 2023-06-22 PROCEDURE — 6360000002 HC RX W HCPCS: Performed by: ANESTHESIOLOGY

## 2023-06-22 PROCEDURE — 83550 IRON BINDING TEST: CPT

## 2023-06-22 PROCEDURE — 36415 COLL VENOUS BLD VENIPUNCTURE: CPT

## 2023-06-22 PROCEDURE — 3600000017 HC SURGERY HYBRID ADDL 15MIN

## 2023-06-22 PROCEDURE — 3700000001 HC ADD 15 MINUTES (ANESTHESIA)

## 2023-06-22 PROCEDURE — 83540 ASSAY OF IRON: CPT

## 2023-06-22 PROCEDURE — 6370000000 HC RX 637 (ALT 250 FOR IP): Performed by: INTERNAL MEDICINE

## 2023-06-22 PROCEDURE — 7100000001 HC PACU RECOVERY - ADDTL 15 MIN

## 2023-06-22 PROCEDURE — 83036 HEMOGLOBIN GLYCOSYLATED A1C: CPT

## 2023-06-22 PROCEDURE — 2500000003 HC RX 250 WO HCPCS

## 2023-06-22 PROCEDURE — 86901 BLOOD TYPING SEROLOGIC RH(D): CPT

## 2023-06-22 PROCEDURE — A4217 STERILE WATER/SALINE, 500 ML: HCPCS

## 2023-06-22 PROCEDURE — 2580000003 HC RX 258: Performed by: ANESTHESIOLOGY

## 2023-06-22 PROCEDURE — 2700000000 HC OXYGEN THERAPY PER DAY

## 2023-06-22 PROCEDURE — 3600000007 HC SURGERY HYBRID BASE

## 2023-06-22 PROCEDURE — 84100 ASSAY OF PHOSPHORUS: CPT

## 2023-06-22 PROCEDURE — 94761 N-INVAS EAR/PLS OXIMETRY MLT: CPT

## 2023-06-22 PROCEDURE — 6360000004 HC RX CONTRAST MEDICATION

## 2023-06-22 PROCEDURE — 83735 ASSAY OF MAGNESIUM: CPT

## 2023-06-22 PROCEDURE — 85027 COMPLETE CBC AUTOMATED: CPT

## 2023-06-22 PROCEDURE — C1768 GRAFT, VASCULAR: HCPCS

## 2023-06-22 PROCEDURE — C1769 GUIDE WIRE: HCPCS

## 2023-06-22 PROCEDURE — 2580000003 HC RX 258: Performed by: NURSE ANESTHETIST, CERTIFIED REGISTERED

## 2023-06-22 PROCEDURE — 2580000003 HC RX 258: Performed by: INTERNAL MEDICINE

## 2023-06-22 PROCEDURE — 04V03DZ RESTRICTION OF ABDOMINAL AORTA WITH INTRALUMINAL DEVICE, PERCUTANEOUS APPROACH: ICD-10-PCS

## 2023-06-22 PROCEDURE — 86900 BLOOD TYPING SEROLOGIC ABO: CPT

## 2023-06-22 PROCEDURE — 2500000003 HC RX 250 WO HCPCS: Performed by: STUDENT IN AN ORGANIZED HEALTH CARE EDUCATION/TRAINING PROGRAM

## 2023-06-22 PROCEDURE — 2709999900 HC NON-CHARGEABLE SUPPLY

## 2023-06-22 PROCEDURE — 3700000000 HC ANESTHESIA ATTENDED CARE

## 2023-06-22 DEVICE — GRAFT EVAR L103MM DIA28X14MM CATH 18FR BIFUR DST DSGN C DEL: Type: IMPLANTABLE DEVICE | Site: AORTA | Status: FUNCTIONAL

## 2023-06-22 DEVICE — GRAFT EVAR L124MM DIA16X20MM CATH 16FR LIMB DST DSGN C DEL: Type: IMPLANTABLE DEVICE | Site: AORTA | Status: FUNCTIONAL

## 2023-06-22 DEVICE — GRAFT EVAR L124MM DIA16X16MM CATH 14FR LIMB DST DSGN C DEL: Type: IMPLANTABLE DEVICE | Site: COMMON ILIAC | Status: FUNCTIONAL

## 2023-06-22 RX ORDER — MIDAZOLAM HYDROCHLORIDE 2 MG/2ML
2 INJECTION, SOLUTION INTRAMUSCULAR; INTRAVENOUS
Status: DISCONTINUED | OUTPATIENT
Start: 2023-06-22 | End: 2023-06-23

## 2023-06-22 RX ORDER — HEPARIN SODIUM 200 [USP'U]/100ML
INJECTION, SOLUTION INTRAVENOUS PRN
Status: DISCONTINUED | OUTPATIENT
Start: 2023-06-22 | End: 2023-06-22

## 2023-06-22 RX ORDER — DEXAMETHASONE SODIUM PHOSPHATE 4 MG/ML
INJECTION, SOLUTION INTRA-ARTICULAR; INTRALESIONAL; INTRAMUSCULAR; INTRAVENOUS; SOFT TISSUE PRN
Status: DISCONTINUED | OUTPATIENT
Start: 2023-06-22 | End: 2023-06-22 | Stop reason: SDUPTHER

## 2023-06-22 RX ORDER — EPHEDRINE SULFATE/0.9% NACL/PF 50 MG/5 ML
SYRINGE (ML) INTRAVENOUS PRN
Status: DISCONTINUED | OUTPATIENT
Start: 2023-06-22 | End: 2023-06-22 | Stop reason: SDUPTHER

## 2023-06-22 RX ORDER — DIPHENHYDRAMINE HYDROCHLORIDE 50 MG/ML
12.5 INJECTION INTRAMUSCULAR; INTRAVENOUS
Status: DISCONTINUED | OUTPATIENT
Start: 2023-06-22 | End: 2023-06-22 | Stop reason: HOSPADM

## 2023-06-22 RX ORDER — GLYCOPYRROLATE 0.2 MG/ML
INJECTION INTRAMUSCULAR; INTRAVENOUS PRN
Status: DISCONTINUED | OUTPATIENT
Start: 2023-06-22 | End: 2023-06-22 | Stop reason: SDUPTHER

## 2023-06-22 RX ORDER — SODIUM CHLORIDE, SODIUM LACTATE, POTASSIUM CHLORIDE, CALCIUM CHLORIDE 600; 310; 30; 20 MG/100ML; MG/100ML; MG/100ML; MG/100ML
INJECTION, SOLUTION INTRAVENOUS CONTINUOUS PRN
Status: DISCONTINUED | OUTPATIENT
Start: 2023-06-22 | End: 2023-06-22 | Stop reason: SDUPTHER

## 2023-06-22 RX ORDER — PROTAMINE SULFATE 10 MG/ML
INJECTION, SOLUTION INTRAVENOUS PRN
Status: DISCONTINUED | OUTPATIENT
Start: 2023-06-22 | End: 2023-06-22 | Stop reason: SDUPTHER

## 2023-06-22 RX ORDER — ROCURONIUM BROMIDE 10 MG/ML
INJECTION, SOLUTION INTRAVENOUS PRN
Status: DISCONTINUED | OUTPATIENT
Start: 2023-06-22 | End: 2023-06-22 | Stop reason: SDUPTHER

## 2023-06-22 RX ORDER — SODIUM CHLORIDE, SODIUM LACTATE, POTASSIUM CHLORIDE, CALCIUM CHLORIDE 600; 310; 30; 20 MG/100ML; MG/100ML; MG/100ML; MG/100ML
INJECTION, SOLUTION INTRAVENOUS CONTINUOUS
Status: DISCONTINUED | OUTPATIENT
Start: 2023-06-22 | End: 2023-06-23

## 2023-06-22 RX ORDER — SUCCINYLCHOLINE/SOD CL,ISO/PF 100 MG/5ML
SYRINGE (ML) INTRAVENOUS PRN
Status: DISCONTINUED | OUTPATIENT
Start: 2023-06-22 | End: 2023-06-22 | Stop reason: SDUPTHER

## 2023-06-22 RX ORDER — PROPOFOL 10 MG/ML
INJECTION, EMULSION INTRAVENOUS PRN
Status: DISCONTINUED | OUTPATIENT
Start: 2023-06-22 | End: 2023-06-22 | Stop reason: SDUPTHER

## 2023-06-22 RX ORDER — SODIUM CHLORIDE, SODIUM LACTATE, POTASSIUM CHLORIDE, CALCIUM CHLORIDE 600; 310; 30; 20 MG/100ML; MG/100ML; MG/100ML; MG/100ML
INJECTION, SOLUTION INTRAVENOUS CONTINUOUS
Status: DISCONTINUED | OUTPATIENT
Start: 2023-06-22 | End: 2023-06-22

## 2023-06-22 RX ORDER — FENTANYL CITRATE 50 UG/ML
100 INJECTION, SOLUTION INTRAMUSCULAR; INTRAVENOUS
Status: DISCONTINUED | OUTPATIENT
Start: 2023-06-22 | End: 2023-06-23

## 2023-06-22 RX ORDER — ONDANSETRON 2 MG/ML
INJECTION INTRAMUSCULAR; INTRAVENOUS PRN
Status: DISCONTINUED | OUTPATIENT
Start: 2023-06-22 | End: 2023-06-22 | Stop reason: SDUPTHER

## 2023-06-22 RX ORDER — HEPARIN SODIUM 10000 [USP'U]/ML
INJECTION, SOLUTION INTRAVENOUS; SUBCUTANEOUS PRN
Status: DISCONTINUED | OUTPATIENT
Start: 2023-06-22 | End: 2023-06-22 | Stop reason: SDUPTHER

## 2023-06-22 RX ORDER — LIDOCAINE HYDROCHLORIDE 20 MG/ML
INJECTION, SOLUTION EPIDURAL; INFILTRATION; INTRACAUDAL; PERINEURAL PRN
Status: DISCONTINUED | OUTPATIENT
Start: 2023-06-22 | End: 2023-06-22 | Stop reason: SDUPTHER

## 2023-06-22 RX ORDER — ONDANSETRON 2 MG/ML
4 INJECTION INTRAMUSCULAR; INTRAVENOUS
Status: DISCONTINUED | OUTPATIENT
Start: 2023-06-22 | End: 2023-06-22 | Stop reason: HOSPADM

## 2023-06-22 RX ORDER — LIDOCAINE HYDROCHLORIDE 10 MG/ML
1 INJECTION, SOLUTION EPIDURAL; INFILTRATION; INTRACAUDAL; PERINEURAL
Status: DISCONTINUED | OUTPATIENT
Start: 2023-06-22 | End: 2023-06-23

## 2023-06-22 RX ORDER — FENTANYL CITRATE 50 UG/ML
INJECTION, SOLUTION INTRAMUSCULAR; INTRAVENOUS PRN
Status: DISCONTINUED | OUTPATIENT
Start: 2023-06-22 | End: 2023-06-22 | Stop reason: SDUPTHER

## 2023-06-22 RX ORDER — NEOSTIGMINE METHYLSULFATE 1 MG/ML
INJECTION, SOLUTION INTRAVENOUS PRN
Status: DISCONTINUED | OUTPATIENT
Start: 2023-06-22 | End: 2023-06-22 | Stop reason: SDUPTHER

## 2023-06-22 RX ORDER — METOPROLOL TARTRATE 5 MG/5ML
INJECTION INTRAVENOUS PRN
Status: DISCONTINUED | OUTPATIENT
Start: 2023-06-22 | End: 2023-06-22 | Stop reason: SDUPTHER

## 2023-06-22 RX ADMIN — ATORVASTATIN CALCIUM 80 MG: 20 TABLET, FILM COATED ORAL at 22:15

## 2023-06-22 RX ADMIN — Medication 20 MG: at 11:32

## 2023-06-22 RX ADMIN — SODIUM CHLORIDE, PRESERVATIVE FREE 10 ML: 5 INJECTION INTRAVENOUS at 08:45

## 2023-06-22 RX ADMIN — PROPOFOL 40 MG: 10 INJECTION, EMULSION INTRAVENOUS at 11:42

## 2023-06-22 RX ADMIN — SODIUM CHLORIDE, PRESERVATIVE FREE 10 ML: 5 INJECTION INTRAVENOUS at 23:26

## 2023-06-22 RX ADMIN — FENTANYL CITRATE 50 MCG: 50 INJECTION, SOLUTION INTRAMUSCULAR; INTRAVENOUS at 12:06

## 2023-06-22 RX ADMIN — TAMSULOSIN HYDROCHLORIDE 0.4 MG: 0.4 CAPSULE ORAL at 17:18

## 2023-06-22 RX ADMIN — HYDROMORPHONE HYDROCHLORIDE 0.5 MG: 1 INJECTION, SOLUTION INTRAMUSCULAR; INTRAVENOUS; SUBCUTANEOUS at 13:52

## 2023-06-22 RX ADMIN — NEOSTIGMINE METHYLSULFATE 3 MG: 1 INJECTION, SOLUTION INTRAVENOUS at 12:57

## 2023-06-22 RX ADMIN — SODIUM CHLORIDE, POTASSIUM CHLORIDE, SODIUM LACTATE AND CALCIUM CHLORIDE: 600; 310; 30; 20 INJECTION, SOLUTION INTRAVENOUS at 17:19

## 2023-06-22 RX ADMIN — METRONIDAZOLE 500 MG: 500 INJECTION, SOLUTION INTRAVENOUS at 06:12

## 2023-06-22 RX ADMIN — DEXAMETHASONE SODIUM PHOSPHATE 8 MG: 4 INJECTION, SOLUTION INTRAMUSCULAR; INTRAVENOUS at 12:28

## 2023-06-22 RX ADMIN — Medication 20 MG: at 11:53

## 2023-06-22 RX ADMIN — PROTAMINE SULFATE 25 MG: 10 INJECTION, SOLUTION INTRAVENOUS at 12:47

## 2023-06-22 RX ADMIN — FENTANYL CITRATE 100 MCG: 50 INJECTION, SOLUTION INTRAMUSCULAR; INTRAVENOUS at 11:25

## 2023-06-22 RX ADMIN — IRON SUCROSE 200 MG: 20 INJECTION, SOLUTION INTRAVENOUS at 08:30

## 2023-06-22 RX ADMIN — LIDOCAINE HYDROCHLORIDE 30 MG: 20 INJECTION, SOLUTION EPIDURAL; INFILTRATION; INTRACAUDAL; PERINEURAL at 11:25

## 2023-06-22 RX ADMIN — Medication 10 MG: at 12:20

## 2023-06-22 RX ADMIN — ROCURONIUM BROMIDE 25 MG: 10 INJECTION INTRAVENOUS at 11:52

## 2023-06-22 RX ADMIN — METRONIDAZOLE 500 MG: 500 INJECTION, SOLUTION INTRAVENOUS at 22:15

## 2023-06-22 RX ADMIN — SODIUM CHLORIDE, POTASSIUM CHLORIDE, SODIUM LACTATE AND CALCIUM CHLORIDE: 600; 310; 30; 20 INJECTION, SOLUTION INTRAVENOUS at 11:38

## 2023-06-22 RX ADMIN — PROPOFOL 40 MG: 10 INJECTION, EMULSION INTRAVENOUS at 11:35

## 2023-06-22 RX ADMIN — SODIUM CHLORIDE, SODIUM LACTATE, POTASSIUM CHLORIDE, CALCIUM CHLORIDE: 600; 310; 30; 20 INJECTION, SOLUTION INTRAVENOUS at 09:43

## 2023-06-22 RX ADMIN — Medication 100 MG: at 11:25

## 2023-06-22 RX ADMIN — METOPROLOL TARTRATE 1 MG: 5 INJECTION, SOLUTION INTRAVENOUS at 12:49

## 2023-06-22 RX ADMIN — OXYCODONE HYDROCHLORIDE 2.5 MG: 5 TABLET ORAL at 00:21

## 2023-06-22 RX ADMIN — PROPOFOL 120 MG: 10 INJECTION, EMULSION INTRAVENOUS at 11:25

## 2023-06-22 RX ADMIN — ERGOCALCIFEROL 50000 UNITS: 1.25 CAPSULE ORAL at 17:18

## 2023-06-22 RX ADMIN — Medication 1 CAPSULE: at 17:18

## 2023-06-22 RX ADMIN — GLYCOPYRROLATE 0.4 MG: 0.2 INJECTION INTRAMUSCULAR; INTRAVENOUS at 12:57

## 2023-06-22 RX ADMIN — METRONIDAZOLE 500 MG: 500 INJECTION, SOLUTION INTRAVENOUS at 17:17

## 2023-06-22 RX ADMIN — WATER 1000 MG: 1 INJECTION INTRAMUSCULAR; INTRAVENOUS; SUBCUTANEOUS at 18:11

## 2023-06-22 RX ADMIN — FENTANYL CITRATE 50 MCG: 50 INJECTION, SOLUTION INTRAMUSCULAR; INTRAVENOUS at 13:04

## 2023-06-22 RX ADMIN — FENTANYL CITRATE 50 MCG: 50 INJECTION, SOLUTION INTRAMUSCULAR; INTRAVENOUS at 12:52

## 2023-06-22 RX ADMIN — RISPERIDONE 1 MG: 1 TABLET ORAL at 22:15

## 2023-06-22 RX ADMIN — MEMANTINE 5 MG: 10 TABLET ORAL at 22:15

## 2023-06-22 RX ADMIN — ONDANSETRON HYDROCHLORIDE 4 MG: 2 SOLUTION INTRAMUSCULAR; INTRAVENOUS at 12:59

## 2023-06-22 RX ADMIN — HEPARIN SODIUM 5000 UNITS: 10000 INJECTION INTRAVENOUS; SUBCUTANEOUS at 12:14

## 2023-06-22 ASSESSMENT — PAIN SCALES - GENERAL
PAINLEVEL_OUTOF10: 6
PAINLEVEL_OUTOF10: 6

## 2023-06-22 ASSESSMENT — PAIN DESCRIPTION - ORIENTATION
ORIENTATION: LEFT
ORIENTATION: RIGHT;LEFT

## 2023-06-22 ASSESSMENT — PAIN DESCRIPTION - DESCRIPTORS
DESCRIPTORS: ACHING
DESCRIPTORS: ACHING

## 2023-06-22 ASSESSMENT — PAIN - FUNCTIONAL ASSESSMENT: PAIN_FUNCTIONAL_ASSESSMENT: NONE - DENIES PAIN

## 2023-06-22 ASSESSMENT — PAIN DESCRIPTION - LOCATION
LOCATION: GROIN
LOCATION: LEG

## 2023-06-22 NOTE — ANESTHESIA POSTPROCEDURE EVALUATION
Department of Anesthesiology  Postprocedure Note    Patient: Dorcas Betancur  MRN: [id number]  YOB: 1935  Date of evaluation: 6/22/2023      Procedure Summary     Date: 06/22/23 Room / Location: [facility] MAIN OR F6 / [facility] MAIN OR    Anesthesia Start: 1114 Anesthesia Stop: 4335    Procedure: ENDOVASCULAR ABDOMINAL AORTIC ANEURYSM REPAIR (EIP) (Bilateral: Groin) Diagnosis:       Abdominal aortic aneurysm (AAA), unspecified part, unspecified whether ruptured ([address]      (Abdominal aortic aneurysm (AAA), unspecified part, unspecified whether ruptured ([address] [I71.40])    Surgeons: Samantha Piña MD Responsible Provider: Guera Cifuentes DO    Anesthesia Type: General ASA Status: 4          Anesthesia Type: General    Tigre Phase I: Tigre Score: 8    Tigre Phase II:        Anesthesia Post Evaluation    Patient location during evaluation: PACU  Patient participation: complete - patient participated  Level of consciousness: awake  Airway patency: patent  Nausea & Vomiting: no vomiting and no nausea  Complications: no  Cardiovascular status: hemodynamically stable  Respiratory status: acceptable  Hydration status: stable  Comments: Patient seen and examined. Ready for discharge from PACU. Still having baseline left knee and back pain.

## 2023-06-22 NOTE — ANESTHESIA PRE PROCEDURE
MD        enoxaparin (LOVENOX) injection 40 mg  40 mg SubCUTAneous Daily Whit Aldrich MD   40 mg at 06/21/23 0925    polyethylene glycol (GLYCOLAX) packet 17 g  17 g Oral Daily PRN Whit Aldrich MD        acetaminophen (TYLENOL) tablet 650 mg  650 mg Oral Q6H PRN Whit Aldrich MD   650 mg at 06/21/23 1955    Or    acetaminophen (TYLENOL) suppository 650 mg  650 mg Rectal Q6H PRN Whit Aldrich MD           Allergies: Allergies   Allergen Reactions    Omeprazole Angioedema       Problem List:    Patient Active Problem List   Diagnosis Code    Spinal stenosis in cervical region M48.02    Acute colitis K52.9       Past Medical History:        Diagnosis Date    Arthritis     Dementia ([facility] [address]     High cholesterol     Hypertension        Past Surgical History:        Procedure Laterality Date    HIP SURGERY Bilateral     KNEE SURGERY Left     ORTHOPEDIC SURGERY      PACEMAKER      [place] UNLISTED PROCEDURE CARDIAC SURGERY      Aortic Valve Replacement       Social History:    Social History     Tobacco Use    Smoking status: Never    Smokeless tobacco: Never   Substance Use Topics    Alcohol use:  No                                Counseling given: Not Answered      Vital Signs (Current):   Vitals:    06/22/23 0059 06/22/23 0256 06/22/23 0830 06/22/23 0928   BP: 126/77 118/73 (!) 161/85 127/64   Pulse: 69 75 72 71   Resp: 16 16 18 12   Temp: 98.2 °F (36.8 °C) 98.2 °F (36.8 °C) 98.1 °F (36.7 °C) 98.3 °F (36.8 °C)   TempSrc: Oral Oral Oral Oral   SpO2: 94% 95%  95%   Weight:    68 kg (150 lb)   Height:    1.575 m (5' 2\")                                              BP Readings from Last 3 Encounters:   06/22/23 127/64   09/21/22 (!) 161/70   09/21/21 (!) 146/94       NPO Status: Time of last liquid consumption: 1700                        Time of last solid consumption: 1700                        Date of last liquid consumption: 06/21/23                        Date of last

## 2023-06-23 LAB
ANION GAP SERPL CALC-SCNC: 6 MMOL/L (ref 5–15)
BUN SERPL-MCNC: 21 MG/DL (ref 6–20)
BUN/CREAT SERPL: 27 (ref 12–20)
CALCIUM SERPL-MCNC: 8.1 MG/DL (ref 8.5–10.1)
CHLORIDE SERPL-SCNC: 107 MMOL/L (ref 97–108)
CO2 SERPL-SCNC: 24 MMOL/L (ref 21–32)
CREAT SERPL-MCNC: 0.79 MG/DL (ref 0.55–1.02)
ERYTHROCYTE [DISTWIDTH] IN BLOOD BY AUTOMATED COUNT: 15.1 % (ref 11.5–14.5)
GLUCOSE SERPL-MCNC: 232 MG/DL (ref 65–100)
HCT VFR BLD AUTO: 26.6 % (ref 35–47)
HGB BLD-MCNC: 8.7 G/DL (ref 11.5–16)
MAGNESIUM SERPL-MCNC: 1.7 MG/DL (ref 1.6–2.4)
MCH RBC QN AUTO: 29.2 PG (ref 26–34)
MCHC RBC AUTO-ENTMCNC: 32.7 G/DL (ref 30–36.5)
MCV RBC AUTO: 89.3 FL (ref 80–99)
NRBC # BLD: 0 K/UL (ref 0–0.01)
NRBC BLD-RTO: 0 PER 100 WBC
PHOSPHATE SERPL-MCNC: 2.2 MG/DL (ref 2.6–4.7)
PLATELET # BLD AUTO: 185 K/UL (ref 150–400)
PMV BLD AUTO: 9.7 FL (ref 8.9–12.9)
POTASSIUM SERPL-SCNC: 4 MMOL/L (ref 3.5–5.1)
RBC # BLD AUTO: 2.98 M/UL (ref 3.8–5.2)
SODIUM SERPL-SCNC: 137 MMOL/L (ref 136–145)
WBC # BLD AUTO: 10.9 K/UL (ref 3.6–11)

## 2023-06-23 PROCEDURE — 2500000003 HC RX 250 WO HCPCS

## 2023-06-23 PROCEDURE — 83735 ASSAY OF MAGNESIUM: CPT

## 2023-06-23 PROCEDURE — 2580000003 HC RX 258

## 2023-06-23 PROCEDURE — 84100 ASSAY OF PHOSPHORUS: CPT

## 2023-06-23 PROCEDURE — 2500000003 HC RX 250 WO HCPCS: Performed by: INTERNAL MEDICINE

## 2023-06-23 PROCEDURE — 2580000003 HC RX 258: Performed by: ANESTHESIOLOGY

## 2023-06-23 PROCEDURE — 97535 SELF CARE MNGMENT TRAINING: CPT

## 2023-06-23 PROCEDURE — 36415 COLL VENOUS BLD VENIPUNCTURE: CPT

## 2023-06-23 PROCEDURE — 1100000003 HC PRIVATE W/ TELEMETRY

## 2023-06-23 PROCEDURE — 6370000000 HC RX 637 (ALT 250 FOR IP): Performed by: INTERNAL MEDICINE

## 2023-06-23 PROCEDURE — 6370000000 HC RX 637 (ALT 250 FOR IP)

## 2023-06-23 PROCEDURE — 97530 THERAPEUTIC ACTIVITIES: CPT

## 2023-06-23 PROCEDURE — 6360000002 HC RX W HCPCS

## 2023-06-23 PROCEDURE — 51798 US URINE CAPACITY MEASURE: CPT

## 2023-06-23 PROCEDURE — 80048 BASIC METABOLIC PNL TOTAL CA: CPT

## 2023-06-23 PROCEDURE — 85027 COMPLETE CBC AUTOMATED: CPT

## 2023-06-23 RX ADMIN — MICONAZOLE NITRATE: 2 POWDER TOPICAL at 20:29

## 2023-06-23 RX ADMIN — METRONIDAZOLE 500 MG: 500 INJECTION, SOLUTION INTRAVENOUS at 06:05

## 2023-06-23 RX ADMIN — Medication 1 CAPSULE: at 09:07

## 2023-06-23 RX ADMIN — Medication 500 MG: at 09:07

## 2023-06-23 RX ADMIN — ACETAMINOPHEN 650 MG: 325 TABLET ORAL at 09:06

## 2023-06-23 RX ADMIN — MEMANTINE 5 MG: 10 TABLET ORAL at 09:07

## 2023-06-23 RX ADMIN — SODIUM CHLORIDE, POTASSIUM CHLORIDE, SODIUM LACTATE AND CALCIUM CHLORIDE: 600; 310; 30; 20 INJECTION, SOLUTION INTRAVENOUS at 03:43

## 2023-06-23 RX ADMIN — SODIUM CHLORIDE, PRESERVATIVE FREE 10 ML: 5 INJECTION INTRAVENOUS at 09:08

## 2023-06-23 RX ADMIN — ENOXAPARIN SODIUM 40 MG: 40 INJECTION SUBCUTANEOUS at 09:07

## 2023-06-23 RX ADMIN — SERTRALINE 25 MG: 25 TABLET, FILM COATED ORAL at 09:07

## 2023-06-23 RX ADMIN — RISPERIDONE 1 MG: 1 TABLET ORAL at 09:07

## 2023-06-23 RX ADMIN — Medication 500 MG: at 20:28

## 2023-06-23 RX ADMIN — MEMANTINE 5 MG: 10 TABLET ORAL at 20:28

## 2023-06-23 RX ADMIN — IRON SUCROSE 200 MG: 20 INJECTION, SOLUTION INTRAVENOUS at 09:07

## 2023-06-23 RX ADMIN — MICONAZOLE NITRATE: 2 POWDER TOPICAL at 15:49

## 2023-06-23 RX ADMIN — TAMSULOSIN HYDROCHLORIDE 0.4 MG: 0.4 CAPSULE ORAL at 09:07

## 2023-06-23 RX ADMIN — RISPERIDONE 1 MG: 1 TABLET ORAL at 20:28

## 2023-06-23 RX ADMIN — SODIUM CHLORIDE, PRESERVATIVE FREE 10 ML: 5 INJECTION INTRAVENOUS at 23:20

## 2023-06-23 RX ADMIN — LOSARTAN POTASSIUM 50 MG: 50 TABLET, FILM COATED ORAL at 09:07

## 2023-06-23 RX ADMIN — ATORVASTATIN CALCIUM 80 MG: 20 TABLET, FILM COATED ORAL at 20:28

## 2023-06-23 ASSESSMENT — PAIN DESCRIPTION - ONSET: ONSET: ON-GOING

## 2023-06-23 ASSESSMENT — PAIN DESCRIPTION - FREQUENCY: FREQUENCY: CONTINUOUS

## 2023-06-23 ASSESSMENT — PAIN DESCRIPTION - LOCATION
LOCATION: ABDOMEN
LOCATION: ABDOMEN

## 2023-06-23 ASSESSMENT — PAIN DESCRIPTION - DESCRIPTORS: DESCRIPTORS: SORE

## 2023-06-23 ASSESSMENT — PAIN SCALES - GENERAL
PAINLEVEL_OUTOF10: 3

## 2023-06-23 ASSESSMENT — PAIN DESCRIPTION - ORIENTATION
ORIENTATION: MID
ORIENTATION: MID

## 2023-06-24 LAB
ANION GAP SERPL CALC-SCNC: 3 MMOL/L (ref 5–15)
BUN SERPL-MCNC: 19 MG/DL (ref 6–20)
BUN/CREAT SERPL: 28 (ref 12–20)
CALCIUM SERPL-MCNC: 8.3 MG/DL (ref 8.5–10.1)
CHLORIDE SERPL-SCNC: 109 MMOL/L (ref 97–108)
CO2 SERPL-SCNC: 29 MMOL/L (ref 21–32)
CREAT SERPL-MCNC: 0.69 MG/DL (ref 0.55–1.02)
ERYTHROCYTE [DISTWIDTH] IN BLOOD BY AUTOMATED COUNT: 15.2 % (ref 11.5–14.5)
GLUCOSE SERPL-MCNC: 132 MG/DL (ref 65–100)
HCT VFR BLD AUTO: 26 % (ref 35–47)
HGB BLD-MCNC: 8.3 G/DL (ref 11.5–16)
MAGNESIUM SERPL-MCNC: 1.6 MG/DL (ref 1.6–2.4)
MCH RBC QN AUTO: 28.8 PG (ref 26–34)
MCHC RBC AUTO-ENTMCNC: 31.9 G/DL (ref 30–36.5)
MCV RBC AUTO: 90.3 FL (ref 80–99)
NRBC # BLD: 0 K/UL (ref 0–0.01)
NRBC BLD-RTO: 0 PER 100 WBC
PHOSPHATE SERPL-MCNC: 2.9 MG/DL (ref 2.6–4.7)
PLATELET # BLD AUTO: 202 K/UL (ref 150–400)
PMV BLD AUTO: 9.7 FL (ref 8.9–12.9)
POTASSIUM SERPL-SCNC: 4.3 MMOL/L (ref 3.5–5.1)
RBC # BLD AUTO: 2.88 M/UL (ref 3.8–5.2)
SODIUM SERPL-SCNC: 141 MMOL/L (ref 136–145)
WBC # BLD AUTO: 13.3 K/UL (ref 3.6–11)

## 2023-06-24 PROCEDURE — 6370000000 HC RX 637 (ALT 250 FOR IP)

## 2023-06-24 PROCEDURE — 94761 N-INVAS EAR/PLS OXIMETRY MLT: CPT

## 2023-06-24 PROCEDURE — 6360000002 HC RX W HCPCS

## 2023-06-24 PROCEDURE — 6370000000 HC RX 637 (ALT 250 FOR IP): Performed by: INTERNAL MEDICINE

## 2023-06-24 PROCEDURE — 6360000002 HC RX W HCPCS: Performed by: INTERNAL MEDICINE

## 2023-06-24 PROCEDURE — 80048 BASIC METABOLIC PNL TOTAL CA: CPT

## 2023-06-24 PROCEDURE — 97164 PT RE-EVAL EST PLAN CARE: CPT

## 2023-06-24 PROCEDURE — 36415 COLL VENOUS BLD VENIPUNCTURE: CPT

## 2023-06-24 PROCEDURE — 83735 ASSAY OF MAGNESIUM: CPT

## 2023-06-24 PROCEDURE — 97530 THERAPEUTIC ACTIVITIES: CPT

## 2023-06-24 PROCEDURE — 84100 ASSAY OF PHOSPHORUS: CPT

## 2023-06-24 PROCEDURE — 85027 COMPLETE CBC AUTOMATED: CPT

## 2023-06-24 PROCEDURE — 1100000003 HC PRIVATE W/ TELEMETRY

## 2023-06-24 PROCEDURE — 2580000003 HC RX 258

## 2023-06-24 RX ORDER — MAGNESIUM SULFATE 1 G/100ML
1000 INJECTION INTRAVENOUS ONCE
Status: COMPLETED | OUTPATIENT
Start: 2023-06-24 | End: 2023-06-24

## 2023-06-24 RX ADMIN — LOSARTAN POTASSIUM 50 MG: 50 TABLET, FILM COATED ORAL at 08:38

## 2023-06-24 RX ADMIN — MEMANTINE 5 MG: 10 TABLET ORAL at 20:07

## 2023-06-24 RX ADMIN — ACETAMINOPHEN 650 MG: 325 TABLET ORAL at 09:34

## 2023-06-24 RX ADMIN — RISPERIDONE 1 MG: 1 TABLET ORAL at 20:07

## 2023-06-24 RX ADMIN — ATORVASTATIN CALCIUM 80 MG: 20 TABLET, FILM COATED ORAL at 20:07

## 2023-06-24 RX ADMIN — SODIUM CHLORIDE, PRESERVATIVE FREE 10 ML: 5 INJECTION INTRAVENOUS at 08:43

## 2023-06-24 RX ADMIN — SODIUM CHLORIDE, PRESERVATIVE FREE 10 ML: 5 INJECTION INTRAVENOUS at 23:12

## 2023-06-24 RX ADMIN — SERTRALINE 25 MG: 25 TABLET, FILM COATED ORAL at 08:38

## 2023-06-24 RX ADMIN — TAMSULOSIN HYDROCHLORIDE 0.4 MG: 0.4 CAPSULE ORAL at 08:38

## 2023-06-24 RX ADMIN — RISPERIDONE 1 MG: 1 TABLET ORAL at 08:38

## 2023-06-24 RX ADMIN — MICONAZOLE NITRATE: 2 POWDER TOPICAL at 08:39

## 2023-06-24 RX ADMIN — MAGNESIUM SULFATE HEPTAHYDRATE 1000 MG: 1 INJECTION, SOLUTION INTRAVENOUS at 08:38

## 2023-06-24 RX ADMIN — MEMANTINE 5 MG: 10 TABLET ORAL at 08:38

## 2023-06-24 RX ADMIN — Medication 1 CAPSULE: at 08:38

## 2023-06-24 RX ADMIN — ENOXAPARIN SODIUM 40 MG: 40 INJECTION SUBCUTANEOUS at 08:38

## 2023-06-24 RX ADMIN — MICONAZOLE NITRATE: 2 POWDER TOPICAL at 23:13

## 2023-06-24 ASSESSMENT — PAIN SCALES - GENERAL
PAINLEVEL_OUTOF10: 3
PAINLEVEL_OUTOF10: 5
PAINLEVEL_OUTOF10: 3

## 2023-06-24 ASSESSMENT — PAIN DESCRIPTION - LOCATION: LOCATION: KNEE

## 2023-06-24 ASSESSMENT — PAIN DESCRIPTION - FREQUENCY: FREQUENCY: CONTINUOUS

## 2023-06-24 ASSESSMENT — PAIN DESCRIPTION - DESCRIPTORS: DESCRIPTORS: NAGGING

## 2023-06-24 ASSESSMENT — PAIN DESCRIPTION - ORIENTATION: ORIENTATION: RIGHT;LEFT

## 2023-06-24 ASSESSMENT — PAIN DESCRIPTION - ONSET: ONSET: ON-GOING

## 2023-06-25 VITALS
TEMPERATURE: 98.3 F | HEIGHT: 62 IN | HEART RATE: 73 BPM | DIASTOLIC BLOOD PRESSURE: 58 MMHG | OXYGEN SATURATION: 93 % | BODY MASS INDEX: 30.03 KG/M2 | SYSTOLIC BLOOD PRESSURE: 115 MMHG | RESPIRATION RATE: 23 BRPM | WEIGHT: 163.2 LBS

## 2023-06-25 PROBLEM — I71.40 ABDOMINAL AORTIC ANEURYSM (AAA) WITHOUT RUPTURE (HCC): Status: ACTIVE | Noted: 2023-06-25

## 2023-06-25 PROBLEM — N39.0 UTI (URINARY TRACT INFECTION): Status: ACTIVE | Noted: 2023-06-25

## 2023-06-25 PROCEDURE — 94761 N-INVAS EAR/PLS OXIMETRY MLT: CPT

## 2023-06-25 PROCEDURE — 6360000002 HC RX W HCPCS

## 2023-06-25 PROCEDURE — 6370000000 HC RX 637 (ALT 250 FOR IP)

## 2023-06-25 PROCEDURE — 2580000003 HC RX 258

## 2023-06-25 PROCEDURE — 6370000000 HC RX 637 (ALT 250 FOR IP): Performed by: INTERNAL MEDICINE

## 2023-06-25 RX ORDER — ALUMINUM ZIRCONIUM OCTACHLOROHYDREX GLY 16 G/100G
1 GEL TOPICAL DAILY
Qty: 30 CAPSULE | Refills: 1 | Status: SHIPPED | OUTPATIENT
Start: 2023-06-26

## 2023-06-25 RX ORDER — LOSARTAN POTASSIUM 50 MG/1
50 TABLET ORAL DAILY
Qty: 30 TABLET | Refills: 1 | Status: ON HOLD | OUTPATIENT
Start: 2023-06-26 | End: 2023-07-01 | Stop reason: HOSPADM

## 2023-06-25 RX ADMIN — TAMSULOSIN HYDROCHLORIDE 0.4 MG: 0.4 CAPSULE ORAL at 08:48

## 2023-06-25 RX ADMIN — RISPERIDONE 1 MG: 1 TABLET ORAL at 08:49

## 2023-06-25 RX ADMIN — ENOXAPARIN SODIUM 40 MG: 40 INJECTION SUBCUTANEOUS at 08:49

## 2023-06-25 RX ADMIN — MEMANTINE 5 MG: 10 TABLET ORAL at 08:48

## 2023-06-25 RX ADMIN — SODIUM CHLORIDE, PRESERVATIVE FREE 10 ML: 5 INJECTION INTRAVENOUS at 08:49

## 2023-06-25 RX ADMIN — ACETAMINOPHEN 650 MG: 325 TABLET ORAL at 05:06

## 2023-06-25 RX ADMIN — SERTRALINE 25 MG: 25 TABLET, FILM COATED ORAL at 08:49

## 2023-06-25 RX ADMIN — LOSARTAN POTASSIUM 50 MG: 50 TABLET, FILM COATED ORAL at 08:48

## 2023-06-25 RX ADMIN — Medication 1 CAPSULE: at 08:48

## 2023-06-25 RX ADMIN — MICONAZOLE NITRATE: 2 POWDER TOPICAL at 08:48

## 2023-06-29 ENCOUNTER — APPOINTMENT (OUTPATIENT)
Facility: HOSPITAL | Age: 88
End: 2023-06-29
Payer: MEDICARE

## 2023-06-29 ENCOUNTER — HOSPITAL ENCOUNTER (INPATIENT)
Facility: HOSPITAL | Age: 88
LOS: 2 days | Discharge: HOME | End: 2023-07-01
Attending: EMERGENCY MEDICINE | Admitting: INTERNAL MEDICINE
Payer: MEDICARE

## 2023-06-29 DIAGNOSIS — I48.91 ATRIAL FIBRILLATION WITH RAPID VENTRICULAR RESPONSE (HCC): ICD-10-CM

## 2023-06-29 DIAGNOSIS — R77.8 ELEVATED TROPONIN: ICD-10-CM

## 2023-06-29 DIAGNOSIS — I50.9 CONGESTIVE HEART FAILURE, UNSPECIFIED HF CHRONICITY, UNSPECIFIED HEART FAILURE TYPE (HCC): ICD-10-CM

## 2023-06-29 LAB
ALBUMIN SERPL-MCNC: 2.5 G/DL (ref 3.5–5)
ALBUMIN/GLOB SERPL: 0.6 (ref 1.1–2.2)
ALP SERPL-CCNC: 100 U/L (ref 45–117)
ALT SERPL-CCNC: 89 U/L (ref 12–78)
AMPHET UR QL SCN: NEGATIVE
ANION GAP SERPL CALC-SCNC: 10 MMOL/L (ref 5–15)
APPEARANCE UR: ABNORMAL
AST SERPL-CCNC: 87 U/L (ref 15–37)
BACTERIA URNS QL MICRO: NEGATIVE /HPF
BARBITURATES UR QL SCN: NEGATIVE
BASOPHILS # BLD: 0.1 K/UL (ref 0–0.1)
BASOPHILS NFR BLD: 0 % (ref 0–1)
BENZODIAZ UR QL: NEGATIVE
BILIRUB SERPL-MCNC: 0.4 MG/DL (ref 0.2–1)
BILIRUB UR QL: NEGATIVE
BUN SERPL-MCNC: 16 MG/DL (ref 6–20)
BUN/CREAT SERPL: 22 (ref 12–20)
CALCIUM SERPL-MCNC: 8.6 MG/DL (ref 8.5–10.1)
CANNABINOIDS UR QL SCN: NEGATIVE
CHLORIDE SERPL-SCNC: 101 MMOL/L (ref 97–108)
CO2 SERPL-SCNC: 26 MMOL/L (ref 21–32)
COCAINE UR QL SCN: NEGATIVE
COLOR UR: ABNORMAL
COMMENT:: NORMAL
CREAT SERPL-MCNC: 0.72 MG/DL (ref 0.55–1.02)
D DIMER PPP FEU-MCNC: 5.62 MG/L FEU (ref 0–0.65)
DIFFERENTIAL METHOD BLD: ABNORMAL
EOSINOPHIL # BLD: 0.7 K/UL (ref 0–0.4)
EOSINOPHIL NFR BLD: 6 % (ref 0–7)
EPITH CASTS URNS QL MICRO: ABNORMAL /LPF
ERYTHROCYTE [DISTWIDTH] IN BLOOD BY AUTOMATED COUNT: 14.9 % (ref 11.5–14.5)
GLOBULIN SER CALC-MCNC: 4.1 G/DL (ref 2–4)
GLUCOSE SERPL-MCNC: 102 MG/DL (ref 65–100)
GLUCOSE UR STRIP.AUTO-MCNC: NEGATIVE MG/DL
HCT VFR BLD AUTO: 27 % (ref 35–47)
HGB BLD-MCNC: 8.5 G/DL (ref 11.5–16)
HGB UR QL STRIP: ABNORMAL
IMM GRANULOCYTES # BLD AUTO: 0.2 K/UL (ref 0–0.04)
IMM GRANULOCYTES NFR BLD AUTO: 2 % (ref 0–0.5)
INR PPP: 1.2 (ref 0.9–1.1)
KETONES UR QL STRIP.AUTO: NEGATIVE MG/DL
LEUKOCYTE ESTERASE UR QL STRIP.AUTO: ABNORMAL
LYMPHOCYTES # BLD: 2.9 K/UL (ref 0.8–3.5)
LYMPHOCYTES NFR BLD: 22 % (ref 12–49)
Lab: NORMAL
MAGNESIUM SERPL-MCNC: 2.2 MG/DL (ref 1.6–2.4)
MCH RBC QN AUTO: 28.5 PG (ref 26–34)
MCHC RBC AUTO-ENTMCNC: 31.5 G/DL (ref 30–36.5)
MCV RBC AUTO: 90.6 FL (ref 80–99)
METHADONE UR QL: NEGATIVE
MONOCYTES # BLD: 1.6 K/UL (ref 0–1)
MONOCYTES NFR BLD: 12 % (ref 5–13)
NEUTS SEG # BLD: 7.6 K/UL (ref 1.8–8)
NEUTS SEG NFR BLD: 58 % (ref 32–75)
NITRITE UR QL STRIP.AUTO: NEGATIVE
NRBC # BLD: 0 K/UL (ref 0–0.01)
NRBC BLD-RTO: 0 PER 100 WBC
NT PRO BNP: 8285 PG/ML
OPIATES UR QL: NEGATIVE
PCP UR QL: NEGATIVE
PH UR STRIP: 6.5 (ref 5–8)
PLATELET # BLD AUTO: 344 K/UL (ref 150–400)
PMV BLD AUTO: 9.2 FL (ref 8.9–12.9)
POTASSIUM SERPL-SCNC: 4.2 MMOL/L (ref 3.5–5.1)
PROT SERPL-MCNC: 6.6 G/DL (ref 6.4–8.2)
PROT UR STRIP-MCNC: NEGATIVE MG/DL
PROTHROMBIN TIME: 11.8 SEC (ref 9–11.1)
RBC # BLD AUTO: 2.98 M/UL (ref 3.8–5.2)
RBC #/AREA URNS HPF: ABNORMAL /HPF (ref 0–5)
SODIUM SERPL-SCNC: 137 MMOL/L (ref 136–145)
SP GR UR REFRACTOMETRY: 1.01 (ref 1–1.03)
SPECIMEN HOLD: NORMAL
TROPONIN I SERPL HS-MCNC: 2583 NG/L (ref 0–51)
TROPONIN I SERPL HS-MCNC: 2702 NG/L (ref 0–51)
TSH SERPL DL<=0.05 MIU/L-ACNC: 4.16 UIU/ML (ref 0.36–3.74)
UROBILINOGEN UR QL STRIP.AUTO: 0.2 EU/DL (ref 0.2–1)
WBC # BLD AUTO: 13 K/UL (ref 3.6–11)
WBC URNS QL MICRO: ABNORMAL /HPF (ref 0–4)
YEAST BUDDING URNS QL: PRESENT

## 2023-06-29 PROCEDURE — 83036 HEMOGLOBIN GLYCOSYLATED A1C: CPT

## 2023-06-29 PROCEDURE — 36415 COLL VENOUS BLD VENIPUNCTURE: CPT

## 2023-06-29 PROCEDURE — 80307 DRUG TEST PRSMV CHEM ANLYZR: CPT

## 2023-06-29 PROCEDURE — 99285 EMERGENCY DEPT VISIT HI MDM: CPT

## 2023-06-29 PROCEDURE — 2500000003 HC RX 250 WO HCPCS: Performed by: EMERGENCY MEDICINE

## 2023-06-29 PROCEDURE — 85379 FIBRIN DEGRADATION QUANT: CPT

## 2023-06-29 PROCEDURE — 85025 COMPLETE CBC W/AUTO DIFF WBC: CPT

## 2023-06-29 PROCEDURE — 80053 COMPREHEN METABOLIC PANEL: CPT

## 2023-06-29 PROCEDURE — 83880 ASSAY OF NATRIURETIC PEPTIDE: CPT

## 2023-06-29 PROCEDURE — 96374 THER/PROPH/DIAG INJ IV PUSH: CPT

## 2023-06-29 PROCEDURE — 6360000002 HC RX W HCPCS

## 2023-06-29 PROCEDURE — 93005 ELECTROCARDIOGRAM TRACING: CPT | Performed by: EMERGENCY MEDICINE

## 2023-06-29 PROCEDURE — 84443 ASSAY THYROID STIM HORMONE: CPT

## 2023-06-29 PROCEDURE — 1100000000 HC RM PRIVATE

## 2023-06-29 PROCEDURE — 87086 URINE CULTURE/COLONY COUNT: CPT

## 2023-06-29 PROCEDURE — 84484 ASSAY OF TROPONIN QUANT: CPT

## 2023-06-29 PROCEDURE — 71045 X-RAY EXAM CHEST 1 VIEW: CPT

## 2023-06-29 PROCEDURE — 85610 PROTHROMBIN TIME: CPT

## 2023-06-29 PROCEDURE — 81001 URINALYSIS AUTO W/SCOPE: CPT

## 2023-06-29 PROCEDURE — 2580000003 HC RX 258: Performed by: EMERGENCY MEDICINE

## 2023-06-29 PROCEDURE — 83735 ASSAY OF MAGNESIUM: CPT

## 2023-06-29 RX ORDER — ACETAMINOPHEN 650 MG/1
650 SUPPOSITORY RECTAL EVERY 6 HOURS PRN
Status: DISCONTINUED | OUTPATIENT
Start: 2023-06-29 | End: 2023-07-01 | Stop reason: HOSPADM

## 2023-06-29 RX ORDER — DILTIAZEM HYDROCHLORIDE 5 MG/ML
5 INJECTION INTRAVENOUS ONCE
Status: COMPLETED | OUTPATIENT
Start: 2023-06-29 | End: 2023-06-29

## 2023-06-29 RX ORDER — NITROGLYCERIN 0.4 MG/1
0.4 TABLET SUBLINGUAL EVERY 5 MIN PRN
Status: DISCONTINUED | OUTPATIENT
Start: 2023-06-29 | End: 2023-07-01 | Stop reason: HOSPADM

## 2023-06-29 RX ORDER — SODIUM CHLORIDE 0.9 % (FLUSH) 0.9 %
5-40 SYRINGE (ML) INJECTION EVERY 12 HOURS SCHEDULED
Status: DISCONTINUED | OUTPATIENT
Start: 2023-06-29 | End: 2023-07-01 | Stop reason: HOSPADM

## 2023-06-29 RX ORDER — CARVEDILOL 3.12 MG/1
3.12 TABLET ORAL 2 TIMES DAILY WITH MEALS
Status: DISCONTINUED | OUTPATIENT
Start: 2023-06-30 | End: 2023-06-30

## 2023-06-29 RX ORDER — SODIUM CHLORIDE 9 MG/ML
INJECTION, SOLUTION INTRAVENOUS PRN
Status: DISCONTINUED | OUTPATIENT
Start: 2023-06-29 | End: 2023-07-01 | Stop reason: HOSPADM

## 2023-06-29 RX ORDER — ONDANSETRON 2 MG/ML
4 INJECTION INTRAMUSCULAR; INTRAVENOUS EVERY 6 HOURS PRN
Status: DISCONTINUED | OUTPATIENT
Start: 2023-06-29 | End: 2023-07-01 | Stop reason: HOSPADM

## 2023-06-29 RX ORDER — MORPHINE SULFATE 2 MG/ML
2 INJECTION, SOLUTION INTRAMUSCULAR; INTRAVENOUS EVERY 4 HOURS PRN
Status: DISCONTINUED | OUTPATIENT
Start: 2023-06-29 | End: 2023-07-01 | Stop reason: HOSPADM

## 2023-06-29 RX ORDER — ACETAMINOPHEN 325 MG/1
650 TABLET ORAL EVERY 6 HOURS PRN
Status: DISCONTINUED | OUTPATIENT
Start: 2023-06-29 | End: 2023-07-01 | Stop reason: HOSPADM

## 2023-06-29 RX ORDER — POLYETHYLENE GLYCOL 3350 17 G/17G
17 POWDER, FOR SOLUTION ORAL DAILY PRN
Status: DISCONTINUED | OUTPATIENT
Start: 2023-06-29 | End: 2023-06-29

## 2023-06-29 RX ORDER — SODIUM CHLORIDE 0.9 % (FLUSH) 0.9 %
5-40 SYRINGE (ML) INJECTION PRN
Status: DISCONTINUED | OUTPATIENT
Start: 2023-06-29 | End: 2023-07-01 | Stop reason: HOSPADM

## 2023-06-29 RX ORDER — FUROSEMIDE 10 MG/ML
INJECTION INTRAMUSCULAR; INTRAVENOUS
Status: COMPLETED
Start: 2023-06-29 | End: 2023-06-29

## 2023-06-29 RX ORDER — ATORVASTATIN CALCIUM 20 MG/1
40 TABLET, FILM COATED ORAL NIGHTLY
Status: DISCONTINUED | OUTPATIENT
Start: 2023-06-29 | End: 2023-07-01 | Stop reason: HOSPADM

## 2023-06-29 RX ORDER — ONDANSETRON 4 MG/1
4 TABLET, ORALLY DISINTEGRATING ORAL EVERY 8 HOURS PRN
Status: DISCONTINUED | OUTPATIENT
Start: 2023-06-29 | End: 2023-07-01 | Stop reason: HOSPADM

## 2023-06-29 RX ORDER — ASPIRIN 81 MG/1
81 TABLET, CHEWABLE ORAL DAILY
Status: DISCONTINUED | OUTPATIENT
Start: 2023-06-30 | End: 2023-07-01 | Stop reason: HOSPADM

## 2023-06-29 RX ORDER — FUROSEMIDE 10 MG/ML
40 INJECTION INTRAMUSCULAR; INTRAVENOUS ONCE
Status: COMPLETED | OUTPATIENT
Start: 2023-06-29 | End: 2023-06-29

## 2023-06-29 RX ORDER — POLYETHYLENE GLYCOL 3350 17 G/17G
17 POWDER, FOR SOLUTION ORAL DAILY PRN
Status: DISCONTINUED | OUTPATIENT
Start: 2023-06-29 | End: 2023-07-01 | Stop reason: HOSPADM

## 2023-06-29 RX ADMIN — FUROSEMIDE 40 MG: 10 INJECTION INTRAMUSCULAR; INTRAVENOUS at 22:35

## 2023-06-29 RX ADMIN — FUROSEMIDE 40 MG: 10 INJECTION, SOLUTION INTRAMUSCULAR; INTRAVENOUS at 22:35

## 2023-06-29 RX ADMIN — SODIUM CHLORIDE 5 MG/HR: 900 INJECTION, SOLUTION INTRAVENOUS at 17:08

## 2023-06-29 RX ADMIN — DILTIAZEM HYDROCHLORIDE 5 MG: 5 INJECTION INTRAVENOUS at 17:08

## 2023-06-29 ASSESSMENT — ENCOUNTER SYMPTOMS
VOMITING: 0
SORE THROAT: 0
COUGH: 1

## 2023-06-29 ASSESSMENT — PAIN - FUNCTIONAL ASSESSMENT: PAIN_FUNCTIONAL_ASSESSMENT: NONE - DENIES PAIN

## 2023-06-30 ENCOUNTER — APPOINTMENT (OUTPATIENT)
Facility: HOSPITAL | Age: 88
End: 2023-06-30
Payer: MEDICARE

## 2023-06-30 PROBLEM — I48.91 ATRIAL FIBRILLATION WITH RAPID VENTRICULAR RESPONSE (HCC): Status: RESOLVED | Noted: 2023-06-29 | Resolved: 2023-06-30

## 2023-06-30 LAB
ANION GAP SERPL CALC-SCNC: 8 MMOL/L (ref 5–15)
BACTERIA SPEC CULT: NORMAL
BASOPHILS # BLD: 0.1 K/UL (ref 0–0.1)
BASOPHILS NFR BLD: 1 % (ref 0–1)
BUN SERPL-MCNC: 11 MG/DL (ref 6–20)
BUN/CREAT SERPL: 22 (ref 12–20)
CALCIUM SERPL-MCNC: 8 MG/DL (ref 8.5–10.1)
CC UR VC: NORMAL
CHLORIDE SERPL-SCNC: 105 MMOL/L (ref 97–108)
CHOLEST SERPL-MCNC: 139 MG/DL
CO2 SERPL-SCNC: 25 MMOL/L (ref 21–32)
CREAT SERPL-MCNC: 0.49 MG/DL (ref 0.55–1.02)
DIFFERENTIAL METHOD BLD: ABNORMAL
ECHO BSA: 1.79 M2
ECHO LA DIAMETER INDEX: 2.74 CM/M2
ECHO LA DIAMETER: 4.8 CM
ECHO LV EDV A2C: 46 ML
ECHO LV EDV A4C: 67 ML
ECHO LV EDV BP: 57 ML (ref 56–104)
ECHO LV EDV INDEX A4C: 38 ML/M2
ECHO LV EDV INDEX BP: 33 ML/M2
ECHO LV EDV NDEX A2C: 26 ML/M2
ECHO LV EJECTION FRACTION A2C: 81 %
ECHO LV EJECTION FRACTION A4C: 77 %
ECHO LV EJECTION FRACTION BIPLANE: 80 % (ref 55–100)
ECHO LV ESV A2C: 9 ML
ECHO LV ESV A4C: 15 ML
ECHO LV ESV BP: 12 ML (ref 19–49)
ECHO LV ESV INDEX A2C: 5 ML/M2
ECHO LV ESV INDEX A4C: 9 ML/M2
ECHO LV ESV INDEX BP: 7 ML/M2
ECHO LV FRACTIONAL SHORTENING: 49 % (ref 28–44)
ECHO LV INTERNAL DIMENSION DIASTOLE INDEX: 2.57 CM/M2
ECHO LV INTERNAL DIMENSION DIASTOLIC: 4.5 CM (ref 3.9–5.3)
ECHO LV INTERNAL DIMENSION SYSTOLIC INDEX: 1.31 CM/M2
ECHO LV INTERNAL DIMENSION SYSTOLIC: 2.3 CM
ECHO LV IVSD: 1 CM (ref 0.6–0.9)
ECHO LV MASS 2D: 153.3 G (ref 67–162)
ECHO LV MASS INDEX 2D: 87.6 G/M2 (ref 43–95)
ECHO LV POSTERIOR WALL DIASTOLIC: 1 CM (ref 0.6–0.9)
ECHO LV RELATIVE WALL THICKNESS RATIO: 0.44
EKG DIAGNOSIS: NORMAL
EKG Q-T INTERVAL: 272 MS
EKG QRS DURATION: 102 MS
EKG QTC CALCULATION (BAZETT): 419 MS
EKG R AXIS: -34 DEGREES
EKG T AXIS: 130 DEGREES
EKG VENTRICULAR RATE: 143 BPM
EOSINOPHIL # BLD: 0.6 K/UL (ref 0–0.4)
EOSINOPHIL NFR BLD: 5 % (ref 0–7)
ERYTHROCYTE [DISTWIDTH] IN BLOOD BY AUTOMATED COUNT: 14.8 % (ref 11.5–14.5)
EST. AVERAGE GLUCOSE BLD GHB EST-MCNC: 94 MG/DL
GLUCOSE SERPL-MCNC: 91 MG/DL (ref 65–100)
HBA1C MFR BLD: 4.9 % (ref 4–5.6)
HCT VFR BLD AUTO: 24.8 % (ref 35–47)
HDLC SERPL-MCNC: 32 MG/DL
HDLC SERPL: 4.3 (ref 0–5)
HGB BLD-MCNC: 8 G/DL (ref 11.5–16)
IMM GRANULOCYTES # BLD AUTO: 0.2 K/UL (ref 0–0.04)
IMM GRANULOCYTES NFR BLD AUTO: 2 % (ref 0–0.5)
INR PPP: 1.3 (ref 0.9–1.1)
LDLC SERPL CALC-MCNC: 86 MG/DL (ref 0–100)
LYMPHOCYTES # BLD: 1.6 K/UL (ref 0.8–3.5)
LYMPHOCYTES NFR BLD: 15 % (ref 12–49)
MCH RBC QN AUTO: 28.9 PG (ref 26–34)
MCHC RBC AUTO-ENTMCNC: 32.3 G/DL (ref 30–36.5)
MCV RBC AUTO: 89.5 FL (ref 80–99)
MONOCYTES # BLD: 1.3 K/UL (ref 0–1)
MONOCYTES NFR BLD: 12 % (ref 5–13)
NEUTS SEG # BLD: 7 K/UL (ref 1.8–8)
NEUTS SEG NFR BLD: 65 % (ref 32–75)
NRBC # BLD: 0 K/UL (ref 0–0.01)
NRBC BLD-RTO: 0 PER 100 WBC
PHOSPHATE SERPL-MCNC: 3.1 MG/DL (ref 2.6–4.7)
PLATELET # BLD AUTO: 332 K/UL (ref 150–400)
PMV BLD AUTO: 9.3 FL (ref 8.9–12.9)
POTASSIUM SERPL-SCNC: 3.6 MMOL/L (ref 3.5–5.1)
PROTHROMBIN TIME: 12.8 SEC (ref 9–11.1)
RBC # BLD AUTO: 2.77 M/UL (ref 3.8–5.2)
SERVICE CMNT-IMP: NORMAL
SODIUM SERPL-SCNC: 138 MMOL/L (ref 136–145)
TRIGL SERPL-MCNC: 105 MG/DL
TROPONIN I SERPL HS-MCNC: 2095 NG/L (ref 0–51)
TROPONIN I SERPL HS-MCNC: 2511 NG/L (ref 0–51)
VLDLC SERPL CALC-MCNC: 21 MG/DL
WBC # BLD AUTO: 10.8 K/UL (ref 3.6–11)

## 2023-06-30 PROCEDURE — 85610 PROTHROMBIN TIME: CPT

## 2023-06-30 PROCEDURE — 97165 OT EVAL LOW COMPLEX 30 MIN: CPT

## 2023-06-30 PROCEDURE — 97535 SELF CARE MNGMENT TRAINING: CPT

## 2023-06-30 PROCEDURE — 80061 LIPID PANEL: CPT

## 2023-06-30 PROCEDURE — 93010 ELECTROCARDIOGRAM REPORT: CPT | Performed by: INTERNAL MEDICINE

## 2023-06-30 PROCEDURE — 6370000000 HC RX 637 (ALT 250 FOR IP): Performed by: INTERNAL MEDICINE

## 2023-06-30 PROCEDURE — 93308 TTE F-UP OR LMTD: CPT | Performed by: SPECIALIST

## 2023-06-30 PROCEDURE — 6360000002 HC RX W HCPCS: Performed by: NURSE PRACTITIONER

## 2023-06-30 PROCEDURE — 51702 INSERT TEMP BLADDER CATH: CPT

## 2023-06-30 PROCEDURE — 97161 PT EVAL LOW COMPLEX 20 MIN: CPT

## 2023-06-30 PROCEDURE — 2580000003 HC RX 258: Performed by: INTERNAL MEDICINE

## 2023-06-30 PROCEDURE — 36415 COLL VENOUS BLD VENIPUNCTURE: CPT

## 2023-06-30 PROCEDURE — 1100000000 HC RM PRIVATE

## 2023-06-30 PROCEDURE — 84484 ASSAY OF TROPONIN QUANT: CPT

## 2023-06-30 PROCEDURE — 97116 GAIT TRAINING THERAPY: CPT

## 2023-06-30 PROCEDURE — 84100 ASSAY OF PHOSPHORUS: CPT

## 2023-06-30 PROCEDURE — 80048 BASIC METABOLIC PNL TOTAL CA: CPT

## 2023-06-30 PROCEDURE — 6360000002 HC RX W HCPCS: Performed by: INTERNAL MEDICINE

## 2023-06-30 PROCEDURE — 85025 COMPLETE CBC W/AUTO DIFF WBC: CPT

## 2023-06-30 PROCEDURE — 2500000003 HC RX 250 WO HCPCS: Performed by: EMERGENCY MEDICINE

## 2023-06-30 PROCEDURE — 94761 N-INVAS EAR/PLS OXIMETRY MLT: CPT

## 2023-06-30 PROCEDURE — 2580000003 HC RX 258: Performed by: EMERGENCY MEDICINE

## 2023-06-30 PROCEDURE — 93308 TTE F-UP OR LMTD: CPT

## 2023-06-30 RX ORDER — TAMSULOSIN HYDROCHLORIDE 0.4 MG/1
0.4 CAPSULE ORAL DAILY
Status: DISCONTINUED | OUTPATIENT
Start: 2023-06-30 | End: 2023-06-30

## 2023-06-30 RX ORDER — ALUMINUM ZIRCONIUM OCTACHLOROHYDREX GLY 16 G/100G
1 GEL TOPICAL DAILY
Status: DISCONTINUED | OUTPATIENT
Start: 2023-06-30 | End: 2023-07-01 | Stop reason: HOSPADM

## 2023-06-30 RX ORDER — MEMANTINE HYDROCHLORIDE 10 MG/1
5 TABLET ORAL 2 TIMES DAILY
Status: DISCONTINUED | OUTPATIENT
Start: 2023-06-30 | End: 2023-07-01 | Stop reason: HOSPADM

## 2023-06-30 RX ORDER — FUROSEMIDE 10 MG/ML
20 INJECTION INTRAMUSCULAR; INTRAVENOUS ONCE
Status: COMPLETED | OUTPATIENT
Start: 2023-06-30 | End: 2023-06-30

## 2023-06-30 RX ORDER — SERTRALINE HYDROCHLORIDE 25 MG/1
25 TABLET, FILM COATED ORAL DAILY
Status: DISCONTINUED | OUTPATIENT
Start: 2023-06-30 | End: 2023-07-01 | Stop reason: HOSPADM

## 2023-06-30 RX ORDER — LOSARTAN POTASSIUM 50 MG/1
50 TABLET ORAL DAILY
Status: DISCONTINUED | OUTPATIENT
Start: 2023-06-30 | End: 2023-06-30

## 2023-06-30 RX ORDER — RISPERIDONE 1 MG/1
1 TABLET ORAL 2 TIMES DAILY
Status: DISCONTINUED | OUTPATIENT
Start: 2023-06-30 | End: 2023-07-01 | Stop reason: HOSPADM

## 2023-06-30 RX ORDER — FUROSEMIDE 40 MG/1
40 TABLET ORAL DAILY
Status: DISCONTINUED | OUTPATIENT
Start: 2023-07-01 | End: 2023-07-01 | Stop reason: HOSPADM

## 2023-06-30 RX ADMIN — DILTIAZEM HYDROCHLORIDE 30 MG: 30 TABLET, FILM COATED ORAL at 17:08

## 2023-06-30 RX ADMIN — DILTIAZEM HYDROCHLORIDE 30 MG: 30 TABLET, FILM COATED ORAL at 13:16

## 2023-06-30 RX ADMIN — TAMSULOSIN HYDROCHLORIDE 0.4 MG: 0.4 CAPSULE ORAL at 08:02

## 2023-06-30 RX ADMIN — SODIUM CHLORIDE, PRESERVATIVE FREE 10 ML: 5 INJECTION INTRAVENOUS at 20:27

## 2023-06-30 RX ADMIN — ACETAMINOPHEN 650 MG: 325 TABLET ORAL at 23:08

## 2023-06-30 RX ADMIN — DILTIAZEM HYDROCHLORIDE 30 MG: 30 TABLET, FILM COATED ORAL at 08:06

## 2023-06-30 RX ADMIN — FUROSEMIDE 20 MG: 10 INJECTION, SOLUTION INTRAMUSCULAR; INTRAVENOUS at 09:55

## 2023-06-30 RX ADMIN — SODIUM CHLORIDE 12.5 MG/HR: 900 INJECTION, SOLUTION INTRAVENOUS at 01:29

## 2023-06-30 RX ADMIN — WATER 1000 MG: 1 INJECTION INTRAMUSCULAR; INTRAVENOUS; SUBCUTANEOUS at 01:21

## 2023-06-30 RX ADMIN — DILTIAZEM HYDROCHLORIDE 30 MG: 30 TABLET, FILM COATED ORAL at 23:09

## 2023-06-30 RX ADMIN — ASPIRIN 81 MG: 81 TABLET, CHEWABLE ORAL at 08:02

## 2023-06-30 RX ADMIN — Medication 1 CAPSULE: at 08:03

## 2023-06-30 RX ADMIN — SODIUM CHLORIDE, PRESERVATIVE FREE 10 ML: 5 INJECTION INTRAVENOUS at 20:26

## 2023-06-30 RX ADMIN — MEMANTINE 5 MG: 10 TABLET ORAL at 10:57

## 2023-06-30 RX ADMIN — RISPERIDONE 1 MG: 1 TABLET ORAL at 01:21

## 2023-06-30 RX ADMIN — SERTRALINE 25 MG: 50 TABLET, FILM COATED ORAL at 08:02

## 2023-06-30 RX ADMIN — LOSARTAN POTASSIUM 50 MG: 50 TABLET, FILM COATED ORAL at 08:03

## 2023-06-30 RX ADMIN — ATORVASTATIN CALCIUM 40 MG: 20 TABLET, FILM COATED ORAL at 20:26

## 2023-06-30 RX ADMIN — MEMANTINE 5 MG: 10 TABLET ORAL at 01:20

## 2023-06-30 RX ADMIN — RISPERIDONE 1 MG: 1 TABLET ORAL at 10:56

## 2023-06-30 RX ADMIN — CARVEDILOL 3.12 MG: 3.12 TABLET, FILM COATED ORAL at 08:02

## 2023-06-30 RX ADMIN — SODIUM CHLORIDE 7.5 MG/HR: 900 INJECTION, SOLUTION INTRAVENOUS at 08:54

## 2023-06-30 RX ADMIN — RISPERIDONE 1 MG: 1 TABLET ORAL at 20:26

## 2023-06-30 RX ADMIN — MEMANTINE 5 MG: 10 TABLET ORAL at 20:26

## 2023-06-30 ASSESSMENT — PAIN SCALES - GENERAL
PAINLEVEL_OUTOF10: 3
PAINLEVEL_OUTOF10: 0
PAINLEVEL_OUTOF10: 0

## 2023-06-30 ASSESSMENT — PAIN DESCRIPTION - ORIENTATION: ORIENTATION: MID

## 2023-06-30 ASSESSMENT — PAIN DESCRIPTION - DESCRIPTORS: DESCRIPTORS: ACHING;SHOOTING

## 2023-06-30 ASSESSMENT — PAIN DESCRIPTION - LOCATION: LOCATION: BACK;SACRUM

## 2023-06-30 ASSESSMENT — PAIN - FUNCTIONAL ASSESSMENT: PAIN_FUNCTIONAL_ASSESSMENT: PREVENTS OR INTERFERES SOME ACTIVE ACTIVITIES AND ADLS

## 2023-06-30 ASSESSMENT — PAIN SCALES - WONG BAKER: WONGBAKER_NUMERICALRESPONSE: 0

## 2023-07-01 VITALS
WEIGHT: 162 LBS | DIASTOLIC BLOOD PRESSURE: 68 MMHG | TEMPERATURE: 97.5 F | OXYGEN SATURATION: 96 % | HEART RATE: 88 BPM | BODY MASS INDEX: 29.81 KG/M2 | RESPIRATION RATE: 16 BRPM | SYSTOLIC BLOOD PRESSURE: 124 MMHG | HEIGHT: 62 IN

## 2023-07-01 LAB
ANION GAP SERPL CALC-SCNC: 4 MMOL/L (ref 5–15)
BASOPHILS # BLD: 0.1 K/UL (ref 0–0.1)
BASOPHILS NFR BLD: 1 % (ref 0–1)
BUN SERPL-MCNC: 11 MG/DL (ref 6–20)
BUN/CREAT SERPL: 18 (ref 12–20)
CALCIUM SERPL-MCNC: 8.3 MG/DL (ref 8.5–10.1)
CHLORIDE SERPL-SCNC: 106 MMOL/L (ref 97–108)
CO2 SERPL-SCNC: 29 MMOL/L (ref 21–32)
CREAT SERPL-MCNC: 0.61 MG/DL (ref 0.55–1.02)
DIFFERENTIAL METHOD BLD: ABNORMAL
EOSINOPHIL # BLD: 0.8 K/UL (ref 0–0.4)
EOSINOPHIL NFR BLD: 7 % (ref 0–7)
ERYTHROCYTE [DISTWIDTH] IN BLOOD BY AUTOMATED COUNT: 15 % (ref 11.5–14.5)
GLUCOSE SERPL-MCNC: 109 MG/DL (ref 65–100)
HCT VFR BLD AUTO: 25.8 % (ref 35–47)
HGB BLD-MCNC: 8 G/DL (ref 11.5–16)
IMM GRANULOCYTES # BLD AUTO: 0.1 K/UL (ref 0–0.04)
IMM GRANULOCYTES NFR BLD AUTO: 1 % (ref 0–0.5)
LYMPHOCYTES # BLD: 2 K/UL (ref 0.8–3.5)
LYMPHOCYTES NFR BLD: 18 % (ref 12–49)
MAGNESIUM SERPL-MCNC: 2 MG/DL (ref 1.6–2.4)
MCH RBC QN AUTO: 28.7 PG (ref 26–34)
MCHC RBC AUTO-ENTMCNC: 31 G/DL (ref 30–36.5)
MCV RBC AUTO: 92.5 FL (ref 80–99)
MONOCYTES # BLD: 1.4 K/UL (ref 0–1)
MONOCYTES NFR BLD: 13 % (ref 5–13)
NEUTS SEG # BLD: 6.7 K/UL (ref 1.8–8)
NEUTS SEG NFR BLD: 60 % (ref 32–75)
NRBC # BLD: 0 K/UL (ref 0–0.01)
NRBC BLD-RTO: 0 PER 100 WBC
PHOSPHATE SERPL-MCNC: 3.7 MG/DL (ref 2.6–4.7)
PLATELET # BLD AUTO: 342 K/UL (ref 150–400)
PMV BLD AUTO: 9 FL (ref 8.9–12.9)
POTASSIUM SERPL-SCNC: 3.5 MMOL/L (ref 3.5–5.1)
RBC # BLD AUTO: 2.79 M/UL (ref 3.8–5.2)
SODIUM SERPL-SCNC: 139 MMOL/L (ref 136–145)
WBC # BLD AUTO: 11.1 K/UL (ref 3.6–11)

## 2023-07-01 PROCEDURE — 6370000000 HC RX 637 (ALT 250 FOR IP): Performed by: INTERNAL MEDICINE

## 2023-07-01 PROCEDURE — 6360000002 HC RX W HCPCS: Performed by: INTERNAL MEDICINE

## 2023-07-01 PROCEDURE — 80048 BASIC METABOLIC PNL TOTAL CA: CPT

## 2023-07-01 PROCEDURE — 85025 COMPLETE CBC W/AUTO DIFF WBC: CPT

## 2023-07-01 PROCEDURE — 2580000003 HC RX 258: Performed by: INTERNAL MEDICINE

## 2023-07-01 PROCEDURE — 6370000000 HC RX 637 (ALT 250 FOR IP): Performed by: STUDENT IN AN ORGANIZED HEALTH CARE EDUCATION/TRAINING PROGRAM

## 2023-07-01 PROCEDURE — 98960 EDU&TRN PT SELF-MGMT NQHP 1: CPT

## 2023-07-01 PROCEDURE — 83735 ASSAY OF MAGNESIUM: CPT

## 2023-07-01 PROCEDURE — 84100 ASSAY OF PHOSPHORUS: CPT

## 2023-07-01 PROCEDURE — 94761 N-INVAS EAR/PLS OXIMETRY MLT: CPT

## 2023-07-01 PROCEDURE — 36415 COLL VENOUS BLD VENIPUNCTURE: CPT

## 2023-07-01 RX ORDER — DILTIAZEM HYDROCHLORIDE 60 MG/1
60 CAPSULE, EXTENDED RELEASE ORAL
Status: COMPLETED | OUTPATIENT
Start: 2023-07-01 | End: 2023-07-01

## 2023-07-01 RX ORDER — ASPIRIN 81 MG/1
81 TABLET, CHEWABLE ORAL DAILY
Qty: 30 TABLET | Refills: 3 | Status: SHIPPED | OUTPATIENT
Start: 2023-07-02 | End: 2023-07-01 | Stop reason: HOSPADM

## 2023-07-01 RX ORDER — CEPHALEXIN 500 MG/1
500 CAPSULE ORAL 2 TIMES DAILY
Qty: 8 CAPSULE | Refills: 0 | Status: SHIPPED | OUTPATIENT
Start: 2023-07-01 | End: 2023-07-05

## 2023-07-01 RX ORDER — FUROSEMIDE 40 MG/1
40 TABLET ORAL DAILY
Qty: 30 TABLET | Refills: 0 | Status: SHIPPED | OUTPATIENT
Start: 2023-07-02 | End: 2023-07-01 | Stop reason: SDUPTHER

## 2023-07-01 RX ORDER — FUROSEMIDE 40 MG/1
20 TABLET ORAL DAILY
Qty: 30 TABLET | Refills: 0 | Status: SHIPPED | OUTPATIENT
Start: 2023-07-02

## 2023-07-01 RX ORDER — DILTIAZEM HYDROCHLORIDE 120 MG/1
120 CAPSULE, COATED, EXTENDED RELEASE ORAL DAILY
Qty: 30 CAPSULE | Refills: 0 | Status: SHIPPED | OUTPATIENT
Start: 2023-07-02

## 2023-07-01 RX ADMIN — SODIUM CHLORIDE, PRESERVATIVE FREE 10 ML: 5 INJECTION INTRAVENOUS at 09:54

## 2023-07-01 RX ADMIN — DILTIAZEM HYDROCHLORIDE 30 MG: 30 TABLET, FILM COATED ORAL at 11:44

## 2023-07-01 RX ADMIN — SERTRALINE 25 MG: 50 TABLET, FILM COATED ORAL at 09:54

## 2023-07-01 RX ADMIN — RISPERIDONE 1 MG: 1 TABLET ORAL at 09:54

## 2023-07-01 RX ADMIN — DILTIAZEM HYDROCHLORIDE 60 MG: 60 CAPSULE, EXTENDED RELEASE ORAL at 14:47

## 2023-07-01 RX ADMIN — MEMANTINE 5 MG: 10 TABLET ORAL at 09:54

## 2023-07-01 RX ADMIN — FUROSEMIDE 40 MG: 40 TABLET ORAL at 05:45

## 2023-07-01 RX ADMIN — DILTIAZEM HYDROCHLORIDE 30 MG: 30 TABLET, FILM COATED ORAL at 05:45

## 2023-07-01 RX ADMIN — ASPIRIN 81 MG: 81 TABLET, CHEWABLE ORAL at 09:54

## 2023-07-01 RX ADMIN — WATER 1000 MG: 1 INJECTION INTRAMUSCULAR; INTRAVENOUS; SUBCUTANEOUS at 00:10

## 2023-07-01 RX ADMIN — Medication 1 CAPSULE: at 09:54

## 2023-07-01 ASSESSMENT — PAIN SCALES - WONG BAKER
WONGBAKER_NUMERICALRESPONSE: 0
WONGBAKER_NUMERICALRESPONSE: 0

## 2023-07-01 ASSESSMENT — PAIN SCALES - GENERAL
PAINLEVEL_OUTOF10: 0
PAINLEVEL_OUTOF10: 0

## 2023-07-03 ENCOUNTER — TELEPHONE (OUTPATIENT)
Facility: HOSPITAL | Age: 88
End: 2023-07-03

## 2023-07-03 NOTE — TELEPHONE ENCOUNTER
HEART FAILURE NURSE NAVIGATOR POST DISCHARGE FOLLOW UP PHONE CALL     [facility] contacted patient by telephone to perform post hospital discharge follow up call. Verified patient name and date of birth as identifiers. Provided introduction to self and role. Patient's son, Marisol Page, answered the phone. Patient has significant dementia and her son manages her care. Reviewed discharge instructions; confirmed patient is in receipt of all prescribed HF medications. He has started all new medications without any problem. No discrepancies noted. Reinforced importance of daily weights and dietary restrictions, following low sodium diet. Marisol Page does have the written HF educational information given to her at discharge. Reinforced signs/symptoms of HF and when to notify the physician. He denies current symptoms and notes her vital signs have all been good. Confirmed knowledge of scheduled follow up appointment:   He has scheduled a  PCP appointment on July 10th. She has a previously scheduled appointment with Vascular Specialist, Dr Laura Lamar, on Friday July 6th. He was unaware of recommended Cardiology follow up. He would prefer to follow up with the Cardiology practice here instead of in [facility]. She previously went to Dr Teo Stallworth at [facility] but has not seen him since August, 2022. He requests help to schedule Cardiology follow up. Son was also educated on scope of service and availability of [facility] and given contact information for them. Confirmed patient has transportation to above appointment. Patient given opportunity to ask questions/express concerns. All questions answered with good understanding. 7/6/23 7566:   Call made to primary contact for patient, patient's son in follow up from previous call to confirm he is aware of appointment has been scheduled with Dr Gabe Morrissey for 9/26/23 at 3:40 pm.  Message left with call back number.

## 2023-07-11 NOTE — PROGRESS NOTES
Physician Progress Note      August Huertas  CSN #:                  [id number]  :                       1935  ADMIT DATE:       2023 4:09 PM  [address] [facility] DATE:        2023 3:10 PM  RESPONDING  PROVIDER #:        Rusty Slater MD          QUERY TEXT:    Good morning. Patient was admitted from - with SOB. Patient was noted to have   elevated troponins, along with EKG changes. The patient did not require   heparin. ? After study, can the patient's cardiac condition be further clarified as: The medical record reflects the following:    Risk Factors: Diastolic CHF, HTN, Mitral Stenosis, s/p aortic valve   replacement, AAA-s/p repair, dementia, dyslipidemia, Afib with RVR    Clinical Indicators: Troponin: 2,702, 2,583, 2,511 and 2,095; EKG: Septal   infarct, age undetermined? ST & T wave abnormality, consider lateral ischemia. Abnormal ECG; from  Cardiology consult: \"1 Elevated troponin -The related   to demand ischemia in the setting of a rapid ventricular heart rate in atrial   fibrillation We will do a limited echo looking for any wall motion   abnormalities -I did not see any wall motion abnormalities but troponin's   elevated\"    Treatment: Labs, EKG, Cardiology consult      Thank you,  Ariana Kim RN, CDI  Options provided:  -- Type 2 MI due to Afib/CHF  -- Demand Ischemia due to Afib/CHF  -- Other - I will add my own diagnosis  -- Disagree - Not applicable / Not valid  -- Disagree - Clinically unable to determine / Unknown  -- Refer to Clinical Documentation Reviewer    PROVIDER RESPONSE TEXT:    This patient had a Type 2 MI due to Afib/CHF. Query created by: Ariana Kim on 2023 7:35 AM      QUERY TEXT:    Good morning. Pt was admitted from - with flash pulmonary edema and diastolic CHF. Pt noted to also have HTN. Echo revealed  EF  65-70%, but valvular disease   and pulmonary HTN. Patient was treated with Lasix.     If

## (undated) PROCEDURE — B4101ZZ FLUOROSCOPY OF ABDOMINAL AORTA USING LOW OSMOLAR CONTRAST: ICD-10-PCS

## (undated) PROCEDURE — 04V00DZ RESTRICTION OF ABDOMINAL AORTA WITH INTRALUMINAL DEVICE, OPEN APPROACH: ICD-10-PCS

## (undated) DEVICE — SUTURE ABSORBABLE BRAIDED 2-0 CT-1 27 IN UD VICRYL J259H

## (undated) DEVICE — DRAPE,CHEST,FENES,15X10,STERIL: Brand: MEDLINE

## (undated) DEVICE — BLADE CLIPPER GEN PURP NS

## (undated) DEVICE — ROADRUNNER, PC WIRE GUIDE THE FIRM: Brand: ROADRUNNER

## (undated) DEVICE — DECANTER FLD 9IN ST BG FOR ASEP TRNSF OF FLD

## (undated) DEVICE — GUIDEWIRE VASC L180CM DIA0.035IN L10CM DIA3MM J TIP PTFE S

## (undated) DEVICE — COVER US PRB W15XL120CM W/ GEL RUBBERBAND TAPE STRP FLD GEN

## (undated) DEVICE — MICROPUNCTURE INTRODUCER SET SILHOUETTE TRANSITIONLESS WITH STAINLESS STEEL WIRE GUIDE: Brand: MICROPUNCTURE

## (undated) DEVICE — GUIDEWIRE VASC L260CM DIA0.035IN L1CM TIP PTFE S STL SHT

## (undated) DEVICE — SUTURE PROL 5-0 L18IN NONABSORBABLE BLU RB-2 L13MM 1/2 CIR 8713H

## (undated) DEVICE — GUIDEWIRE ENDOSCP L150CM DIA0.035IN TIP L15CM BENT PTFE

## (undated) DEVICE — 3M™ IOBAN™ 2 ANTIMICROBIAL INCISE DRAPE 6648EZ: Brand: IOBAN™ 2

## (undated) DEVICE — SHEATH INTRO 16FR L28CM 0.035IN GWIRE HYDRPHLC LOK

## (undated) DEVICE — TUBE EXTN L48IN HI PRSS W/ ROT ADPT

## (undated) DEVICE — COVER,TABLE,60X90,STERILE: Brand: MEDLINE

## (undated) DEVICE — Device

## (undated) DEVICE — LIQUIBAND RAPID ADHESIVE 36/CS 0.8ML: Brand: MEDLINE

## (undated) DEVICE — C-ARM: Brand: UNBRANDED

## (undated) DEVICE — CATHETER PTCA 8FR L100CM BLLN DIA10-46MM SHTH 12FR GWIRE

## (undated) DEVICE — BLANKET WRM W35.4XL86.6IN FULL UNDERBODY + FORC AIR

## (undated) DEVICE — SUTURE MCRYL SZ 4-0 L27IN ABSRB UD L19MM PS-2 1/2 CIR PRIM Y426H

## (undated) DEVICE — 1LYRTR 16FR10ML100%SIL UMS SNP: Brand: MEDLINE INDUSTRIES, INC.

## (undated) DEVICE — AMC/4 ARTERIAL NEEDLE – 18GA X 2.75” (7CM): Brand: ARTERIAL NEEDLE

## (undated) DEVICE — RADIFOCUS GLIDEWIRE: Brand: GLIDEWIRE

## (undated) DEVICE — SYRINGE 20ML LL S/C 50

## (undated) DEVICE — GLOVE ORANGE PI 7 1/2   MSG9075

## (undated) DEVICE — CATHETER ANGIO 5FR L65CM 0.038IN BERN SEL SFT RADPQ TIP HI

## (undated) DEVICE — FLOSEAL MATRIX IS INDICATED IN SURGICAL PROCEDURES (OTHER THAN IN OPHTHALMIC) AS AN ADJUNCT TO HEMOSTASIS WHEN CONTROL OF BLEEDING BY LIGATURE OR CONVENTIONALPROCEDURES IS INEFFECTIVE OR IMPRACTICAL.: Brand: FLOSEAL HEMOSTATIC MATRIX

## (undated) DEVICE — VASCULAR-SFMC: Brand: MEDLINE INDUSTRIES, INC.

## (undated) DEVICE — CATHETER ANGIO PIG MB FLSH 5 FRX65 CM 6 SH SUPER TORQUE

## (undated) DEVICE — PINNACLE INTRODUCER SHEATH: Brand: PINNACLE

## (undated) DEVICE — SUTURE VCRL SZ 3-0 L27IN ABSRB UD L26MM SH 1/2 CIR J416H